# Patient Record
Sex: FEMALE | Race: WHITE | NOT HISPANIC OR LATINO | Employment: UNEMPLOYED | URBAN - METROPOLITAN AREA
[De-identification: names, ages, dates, MRNs, and addresses within clinical notes are randomized per-mention and may not be internally consistent; named-entity substitution may affect disease eponyms.]

---

## 2017-01-30 DIAGNOSIS — R00.0 TACHYCARDIA: ICD-10-CM

## 2017-01-30 RX ORDER — ATENOLOL 50 MG/1
TABLET ORAL
Qty: 30 TABLET | Refills: 0 | Status: SHIPPED | OUTPATIENT
Start: 2017-01-30 | End: 2017-03-02 | Stop reason: SDUPTHER

## 2017-01-30 NOTE — TELEPHONE ENCOUNTER
----- Message from Sherita Ulrich sent at 1/30/2017 12:25 PM CST -----  Refill: atenolol (TENORMIN) 50 MG tablet     Please send to [facility]. Thank you!

## 2017-03-02 DIAGNOSIS — R00.0 TACHYCARDIA: ICD-10-CM

## 2017-03-02 RX ORDER — ATENOLOL 50 MG/1
TABLET ORAL
Qty: 30 TABLET | Refills: 0 | Status: SHIPPED | OUTPATIENT
Start: 2017-03-02 | End: 2017-04-04 | Stop reason: SDUPTHER

## 2017-04-04 ENCOUNTER — OFFICE VISIT (OUTPATIENT)
Dept: FAMILY MEDICINE | Facility: CLINIC | Age: 52
End: 2017-04-04
Payer: COMMERCIAL

## 2017-04-04 VITALS
DIASTOLIC BLOOD PRESSURE: 110 MMHG | SYSTOLIC BLOOD PRESSURE: 148 MMHG | BODY MASS INDEX: 30.48 KG/M2 | HEART RATE: 84 BPM | TEMPERATURE: 98 F | HEIGHT: 64 IN | WEIGHT: 178.56 LBS | OXYGEN SATURATION: 99 %

## 2017-04-04 DIAGNOSIS — R00.0 TACHYCARDIA: ICD-10-CM

## 2017-04-04 DIAGNOSIS — I10 ESSENTIAL HYPERTENSION: ICD-10-CM

## 2017-04-04 DIAGNOSIS — Z00.00 ANNUAL PHYSICAL EXAM: Primary | ICD-10-CM

## 2017-04-04 DIAGNOSIS — M79.7 FIBROMYALGIA: ICD-10-CM

## 2017-04-04 PROCEDURE — 3080F DIAST BP >= 90 MM HG: CPT | Mod: S$GLB,,, | Performed by: FAMILY MEDICINE

## 2017-04-04 PROCEDURE — 99999 PR PBB SHADOW E&M-EST. PATIENT-LVL III: CPT | Mod: PBBFAC,,, | Performed by: FAMILY MEDICINE

## 2017-04-04 PROCEDURE — 3077F SYST BP >= 140 MM HG: CPT | Mod: S$GLB,,, | Performed by: FAMILY MEDICINE

## 2017-04-04 PROCEDURE — 99396 PREV VISIT EST AGE 40-64: CPT | Mod: S$GLB,,, | Performed by: FAMILY MEDICINE

## 2017-04-04 RX ORDER — ATENOLOL 50 MG/1
50 TABLET ORAL DAILY
Qty: 30 TABLET | Refills: 11 | Status: SHIPPED | OUTPATIENT
Start: 2017-04-04 | End: 2017-08-06 | Stop reason: ALTCHOICE

## 2017-04-04 RX ORDER — AMLODIPINE BESYLATE 2.5 MG/1
2.5 TABLET ORAL DAILY
Qty: 30 TABLET | Refills: 0 | Status: SHIPPED | OUTPATIENT
Start: 2017-04-04 | End: 2017-05-04 | Stop reason: SDUPTHER

## 2017-04-04 NOTE — PROGRESS NOTES
"Chief Complaint   Patient presents with    Medication Refill     SUBJECTIVE:   51 y.o. female for annual routine checkup.  Current Outpatient Prescriptions   Medication Sig Dispense Refill    atenolol (TENORMIN) 50 MG tablet Take 1 tablet (50 mg total) by mouth once daily. 30 tablet 11    butalbital-acetaminophen-caffeine -40 mg (FIORICET, ESGIC) -40 mg per tablet Take 1 tablet by mouth 2 (two) times daily as needed for Headaches. 60 tablet 0    fluticasone (FLONASE) 50 mcg/actuation nasal spray       LYRICA 75 mg capsule       tamoxifen (NOLVADEX) 20 MG Tab       tramadol (ULTRAM) 50 mg tablet Take 50 mg by mouth every 6 (six) hours as needed.      amlodipine (NORVASC) 2.5 MG tablet Take 1 tablet (2.5 mg total) by mouth once daily. 30 tablet 0     No current facility-administered medications for this visit.      Allergies: Erythromycin   Patient's last menstrual period was 03/28/2017.    ROS:  Feeling well. No dyspnea or chest pain on exertion.  No abdominal pain, change in bowel habits, black or bloody stools.  No urinary tract symptoms. GYN ROS: normal menses, no abnormal bleeding, pelvic pain or discharge, no breast pain or new or enlarging lumps on self exam. No neurological complaints.    OBJECTIVE:   The patient appears well, alert, oriented x 3, in no distress.  BP (!) 148/110 (BP Location: Left arm, Patient Position: Sitting, BP Method: Manual)  Pulse 84  Temp 97.7 °F (36.5 °C) (Oral)   Ht 5' 4" (1.626 m)  Wt 81 kg (178 lb 9.2 oz)  LMP 03/28/2017  SpO2 99%  BMI 30.65 kg/m2  ENT normal.  Neck supple. No adenopathy or thyromegaly. GERMAN. Lungs are clear, good air entry, no wheezes, rhonchi or rales. S1 and S2 normal, no murmurs, regular rate and rhythm. Abdomen soft without tenderness, guarding, mass or organomegaly. Extremities show no edema, normal peripheral pulses. Neurological is normal, no focal findings.    BREAST EXAM: deferred    PELVIC EXAM: deferred    ASSESSMENT:   1. " Annual physical exam    2. Tachycardia    3. Essential hypertension    4. Fibromyalgia          PLAN:   Vane was seen today for medication refill.    Diagnoses and all orders for this visit:    Annual physical exam  Counseled on age appropriate medical preventative services, including age appropriate cancer screenings, over all nutritional health, need for a consistent exercise regimen and an over all push towards maintaining a vigorous and active lifestyle.  Counseled on age appropriate vaccines and discussed upcoming health care needs based on age/gender.  Spent time with patient counseling on need for a good patient/doctor relationship moving forward.  Discussed use of common OTC medications and supplements.  Discussed common dietary aids and use of caffeine and the need for good sleep hygiene and stress management.    Tachycardia  -     atenolol (TENORMIN) 50 MG tablet; Take 1 tablet (50 mg total) by mouth once daily.    Essential hypertension  -     amlodipine (NORVASC) 2.5 MG tablet; Take 1 tablet (2.5 mg total) by mouth once daily.    Fibromyalgia  -     amlodipine (NORVASC) 2.5 MG tablet; Take 1 tablet (2.5 mg total) by mouth once daily.    Potential medication side effects were discussed with the patient; let me know if any occur.      We will see her back in 1 month to increase norvasc if any trouble

## 2017-05-04 DIAGNOSIS — M79.7 FIBROMYALGIA: ICD-10-CM

## 2017-05-04 DIAGNOSIS — I10 ESSENTIAL HYPERTENSION: ICD-10-CM

## 2017-05-04 RX ORDER — AMLODIPINE BESYLATE 5 MG/1
5 TABLET ORAL DAILY
Qty: 30 TABLET | Refills: 0 | Status: SHIPPED | OUTPATIENT
Start: 2017-05-04 | End: 2017-06-02 | Stop reason: SDUPTHER

## 2017-06-02 DIAGNOSIS — I10 ESSENTIAL HYPERTENSION: ICD-10-CM

## 2017-06-02 DIAGNOSIS — M79.7 FIBROMYALGIA: ICD-10-CM

## 2017-06-02 RX ORDER — AMLODIPINE BESYLATE 10 MG/1
10 TABLET ORAL DAILY
Qty: 30 TABLET | Refills: 1 | Status: SHIPPED | OUTPATIENT
Start: 2017-06-02 | End: 2017-09-25

## 2017-08-04 ENCOUNTER — TELEPHONE (OUTPATIENT)
Dept: FAMILY MEDICINE | Facility: CLINIC | Age: 52
End: 2017-08-04

## 2017-08-04 NOTE — TELEPHONE ENCOUNTER
----- Message from Adelaide Macias sent at 8/4/2017  2:25 PM CDT -----  Contact: [facility] [phone]  Pharmacy is requesting a change on the atenolol (TENORMIN) 50 MG tablet because its on back order pt is currently at the pharmacy Please call  at your earliest convenience.  Thanks

## 2017-08-06 RX ORDER — METOPROLOL TARTRATE 25 MG/1
25 TABLET, FILM COATED ORAL 2 TIMES DAILY
Qty: 60 TABLET | Refills: 2 | Status: SHIPPED | OUTPATIENT
Start: 2017-08-06 | End: 2017-11-28 | Stop reason: SDUPTHER

## 2017-09-25 ENCOUNTER — OFFICE VISIT (OUTPATIENT)
Dept: FAMILY MEDICINE | Facility: CLINIC | Age: 52
End: 2017-09-25
Payer: COMMERCIAL

## 2017-09-25 VITALS
SYSTOLIC BLOOD PRESSURE: 110 MMHG | WEIGHT: 179 LBS | OXYGEN SATURATION: 96 % | HEART RATE: 63 BPM | HEIGHT: 64 IN | BODY MASS INDEX: 30.56 KG/M2 | DIASTOLIC BLOOD PRESSURE: 80 MMHG | TEMPERATURE: 98 F

## 2017-09-25 DIAGNOSIS — M79.7 FIBROMYALGIA: Primary | ICD-10-CM

## 2017-09-25 DIAGNOSIS — Z17.0 MALIGNANT NEOPLASM OF RIGHT BREAST IN FEMALE, ESTROGEN RECEPTOR POSITIVE, UNSPECIFIED SITE OF BREAST: ICD-10-CM

## 2017-09-25 DIAGNOSIS — F39 MOOD DISORDER: ICD-10-CM

## 2017-09-25 DIAGNOSIS — Z12.11 SPECIAL SCREENING FOR MALIGNANT NEOPLASMS, COLON: ICD-10-CM

## 2017-09-25 DIAGNOSIS — C50.911 MALIGNANT NEOPLASM OF RIGHT BREAST IN FEMALE, ESTROGEN RECEPTOR POSITIVE, UNSPECIFIED SITE OF BREAST: ICD-10-CM

## 2017-09-25 DIAGNOSIS — I10 ESSENTIAL HYPERTENSION: ICD-10-CM

## 2017-09-25 DIAGNOSIS — F41.9 ANXIETY: ICD-10-CM

## 2017-09-25 PROCEDURE — 99999 PR PBB SHADOW E&M-EST. PATIENT-LVL III: CPT | Mod: PBBFAC,,, | Performed by: FAMILY MEDICINE

## 2017-09-25 PROCEDURE — 3079F DIAST BP 80-89 MM HG: CPT | Mod: S$GLB,,, | Performed by: FAMILY MEDICINE

## 2017-09-25 PROCEDURE — 3008F BODY MASS INDEX DOCD: CPT | Mod: S$GLB,,, | Performed by: FAMILY MEDICINE

## 2017-09-25 PROCEDURE — 99215 OFFICE O/P EST HI 40 MIN: CPT | Mod: S$GLB,,, | Performed by: FAMILY MEDICINE

## 2017-09-25 PROCEDURE — 3074F SYST BP LT 130 MM HG: CPT | Mod: S$GLB,,, | Performed by: FAMILY MEDICINE

## 2017-09-25 RX ORDER — BUSPIRONE HYDROCHLORIDE 5 MG/1
5 TABLET ORAL 2 TIMES DAILY
Qty: 60 TABLET | Refills: 5 | Status: SHIPPED | OUTPATIENT
Start: 2017-09-25 | End: 2018-03-13 | Stop reason: SDUPTHER

## 2017-09-25 RX ORDER — PREGABALIN 75 MG/1
75 CAPSULE ORAL 2 TIMES DAILY
Qty: 60 CAPSULE | Refills: 5 | Status: SHIPPED | OUTPATIENT
Start: 2017-09-25 | End: 2018-05-11 | Stop reason: SDUPTHER

## 2017-09-25 RX ORDER — ALPRAZOLAM 2 MG/1
2 TABLET ORAL NIGHTLY PRN
Qty: 25 TABLET | Refills: 0 | Status: SHIPPED | OUTPATIENT
Start: 2017-09-25 | End: 2019-02-22 | Stop reason: SDUPTHER

## 2017-09-25 RX ORDER — TRAMADOL HYDROCHLORIDE 50 MG/1
50 TABLET ORAL EVERY 6 HOURS PRN
Qty: 120 TABLET | Refills: 2 | Status: SHIPPED | OUTPATIENT
Start: 2017-09-25 | End: 2017-12-22 | Stop reason: SDUPTHER

## 2017-09-25 NOTE — PROGRESS NOTES
"Chief Complaint   Patient presents with    Medication Refill    Follow-up       SUBJECTIVE:  Vane Seo is a 52 y.o. female here for follow up of pain management for fibro and she has a lot of stress, yelling at her daughter and . Life stressors and she is having more sleep disturbance, still on tomoxifen for breast CA, no new lumps or bumps in the breast.  Currently has co-morbidities including per problem list.      Social History   Substance Use Topics    Smoking status: Never Smoker    Smokeless tobacco: Never Used    Alcohol use Yes       Patient Active Problem List    Diagnosis Date Noted    Anxiety 09/25/2017    Essential hypertension 04/04/2017    Fibromyalgia 09/04/2015 4/17 start norvasc 2.5, increase monthly by 2.5 to 10 mg.      History of pilonidal cyst 09/04/2015    Menstrual migraine without status migrainosus, not intractable 09/04/2015    Mood disorder 09/04/2015    Breast cancer 02/04/2014     Still on tamoxifen (2019)  mina for cancer, Vichot for arthritis         Review of Systems   Constitutional: Negative.    HENT: Negative.    Eyes: Negative.    Respiratory: Negative.    Cardiovascular: Negative.    Gastrointestinal: Negative.    Genitourinary: Negative.    Musculoskeletal: Positive for myalgias.   Skin: Negative.    Neurological: Negative.    Endo/Heme/Allergies: Negative.    Psychiatric/Behavioral: Positive for depression. The patient has insomnia.        OBJECTIVE:  /80   Pulse 63   Temp 98.3 °F (36.8 °C) (Oral)   Ht 5' 4" (1.626 m)   Wt 81.2 kg (179 lb 0.2 oz)   SpO2 96%   BMI 30.73 kg/m²     Wt Readings from Last 3 Encounters:   09/25/17 81.2 kg (179 lb 0.2 oz)   04/04/17 81 kg (178 lb 9.2 oz)   11/01/16 83.9 kg (185 lb)     BP Readings from Last 3 Encounters:   09/25/17 110/80   04/04/17 (!) 148/110   11/01/16 (!) 132/94       She appears well, in no apparent distress.  Alert and oriented times three, pleasant and cooperative. Vital signs are " as documented in vital signs section.  S1 and S2 normal, no murmurs, clicks, gallops or rubs. Regular rate and rhythm. Chest is clear; no wheezes or rales. No edema or JVD.  She is doing well today  A general joint exam is normal with full range of motion of spine, shoulders, elbows, wrists, fingers, hips, knees and ankles; no active swelling, tenderness or synovitis at any joint. No soft tissue nodules.      Review of old Records:  Reviewed per [facility]    Review of old labs:    Lab Results   Component Value Date    TSH 2.615 09/04/2015     Lab Results   Component Value Date    WBC 5.44 09/04/2015    HGB 13.2 09/04/2015    HCT 40.4 09/04/2015    MCV 83 09/04/2015     09/04/2015       Chemistry        Component Value Date/Time     09/04/2015 1027    K 3.9 09/04/2015 1027     09/04/2015 1027    CO2 28 09/04/2015 1027    BUN 10 09/04/2015 1027    CREATININE 0.8 09/04/2015 1027    GLU 94 09/04/2015 1027        Component Value Date/Time    CALCIUM 9.1 09/04/2015 1027    ALKPHOS 75 09/04/2015 1027    AST 14 09/04/2015 1027    ALT 12 09/04/2015 1027    BILITOT 0.4 09/04/2015 1027    ESTGFRAFRICA >60 09/04/2015 1027    EGFRNONAA >60 09/04/2015 1027        Lab Results   Component Value Date    CHOL 180 09/04/2015     Lab Results   Component Value Date    HDL 51 09/04/2015     Lab Results   Component Value Date    LDLCALC 100.2 09/04/2015     Lab Results   Component Value Date    TRIG 144 09/04/2015     Lab Results   Component Value Date    CHOLHDL 28.3 09/04/2015         Review of old imaging:  n/a      ASSESSMENT:  Problem List Items Addressed This Visit     Mood disorder    Fibromyalgia - Primary    Relevant Medications    tramadol (ULTRAM) 50 mg tablet    pregabalin (LYRICA) 75 MG capsule    Essential hypertension    Relevant Orders    TSH    CBC auto differential    Comprehensive metabolic panel    Lipid panel    Breast cancer    Anxiety    Relevant Medications    alprazolam (XANAX) 2 MG Tab    busPIRone  (BUSPAR) 5 MG Tab      Other Visit Diagnoses     Special screening for malignant neoplasms, colon        Relevant Orders    Case request GI: COLONOSCOPY (Completed)          ICD-10-CM ICD-9-CM   1. Fibromyalgia M79.7 729.1   2. Essential hypertension I10 401.9   3. Mood disorder F39 296.90   4. Malignant neoplasm of right breast in female, estrogen receptor positive, unspecified site of breast C50.911 174.9    Z17.0 V86.0   5. Special screening for malignant neoplasms, colon Z12.11 V76.51   6. Anxiety F41.9 300.00               PLAN:     we will take over the tramadol and lyrica    Start buspar and xanax PRN    Went over black box warning of benzo and tramadol    Continue tamoxifen    Get updated screening.        Medication List with Changes/Refills   New Medications    ALPRAZOLAM (XANAX) 2 MG TAB    Take 1 tablet (2 mg total) by mouth nightly as needed.    BUSPIRONE (BUSPAR) 5 MG TAB    Take 1 tablet (5 mg total) by mouth 2 (two) times daily.   Current Medications    BUTALBITAL-ACETAMINOPHEN-CAFFEINE -40 MG (FIORICET, ESGIC) -40 MG PER TABLET    Take 1 tablet by mouth 2 (two) times daily as needed for Headaches.    FLUTICASONE (FLONASE) 50 MCG/ACTUATION NASAL SPRAY        METOPROLOL TARTRATE (LOPRESSOR) 25 MG TABLET    Take 1 tablet (25 mg total) by mouth 2 (two) times daily. (discontinue atenolol)    TAMOXIFEN (NOLVADEX) 20 MG TAB       Changed and/or Refilled Medications    Modified Medication Previous Medication    PREGABALIN (LYRICA) 75 MG CAPSULE LYRICA 75 mg capsule       Take 1 capsule (75 mg total) by mouth 2 (two) times daily.    Take 75 mg by mouth.     TRAMADOL (ULTRAM) 50 MG TABLET tramadol (ULTRAM) 50 mg tablet       Take 1 tablet (50 mg total) by mouth every 6 (six) hours as needed.    Take 50 mg by mouth every 6 (six) hours as needed.   Discontinued Medications    AMLODIPINE (NORVASC) 10 MG TABLET    Take 1 tablet (10 mg total) by mouth once daily.       No Follow-up on file.

## 2017-11-28 DIAGNOSIS — I10 ESSENTIAL HYPERTENSION: Primary | ICD-10-CM

## 2017-11-28 RX ORDER — METOPROLOL TARTRATE 25 MG/1
25 TABLET, FILM COATED ORAL 2 TIMES DAILY
Qty: 60 TABLET | Refills: 2 | Status: SHIPPED | OUTPATIENT
Start: 2017-11-28 | End: 2018-03-08 | Stop reason: SDUPTHER

## 2017-11-28 NOTE — TELEPHONE ENCOUNTER
----- Message from Pamela Aguila sent at 11/28/2017 11:16 AM CST -----  Contact: SELF  REFILL: metoprolol tartrate (LOPRESSOR) 25 MG tablet    PLEASE SEND TO [facility]

## 2017-12-22 ENCOUNTER — TELEPHONE (OUTPATIENT)
Dept: FAMILY MEDICINE | Facility: CLINIC | Age: 52
End: 2017-12-22

## 2017-12-22 ENCOUNTER — OFFICE VISIT (OUTPATIENT)
Dept: FAMILY MEDICINE | Facility: CLINIC | Age: 52
End: 2017-12-22
Payer: COMMERCIAL

## 2017-12-22 VITALS
SYSTOLIC BLOOD PRESSURE: 130 MMHG | OXYGEN SATURATION: 97 % | RESPIRATION RATE: 16 BRPM | WEIGHT: 177.94 LBS | HEIGHT: 63 IN | BODY MASS INDEX: 31.53 KG/M2 | DIASTOLIC BLOOD PRESSURE: 84 MMHG | TEMPERATURE: 99 F | HEART RATE: 68 BPM

## 2017-12-22 DIAGNOSIS — J04.0 LARYNGITIS: Primary | ICD-10-CM

## 2017-12-22 DIAGNOSIS — M79.7 FIBROMYALGIA: ICD-10-CM

## 2017-12-22 PROCEDURE — 99999 PR PBB SHADOW E&M-EST. PATIENT-LVL IV: CPT | Mod: PBBFAC,,, | Performed by: PHYSICIAN ASSISTANT

## 2017-12-22 PROCEDURE — 99213 OFFICE O/P EST LOW 20 MIN: CPT | Mod: S$GLB,,, | Performed by: PHYSICIAN ASSISTANT

## 2017-12-22 RX ORDER — TRAMADOL HYDROCHLORIDE 50 MG/1
TABLET ORAL
Qty: 150 TABLET | Refills: 2 | Status: SHIPPED | OUTPATIENT
Start: 2017-12-22 | End: 2018-03-25 | Stop reason: SDUPTHER

## 2017-12-22 RX ORDER — BROMPHENIRAMINE MALEATE, DEXTROMETHORPHAN HYDROBROMIDE AND PHENYLEPHRINE HYDROCHLORIDE 1; 5; 2.5 MG/5ML; MG/5ML; MG/5ML
LIQUID ORAL
Refills: 0 | COMMUNITY
Start: 2017-12-20 | End: 2019-02-22

## 2017-12-22 RX ORDER — DOXYCYCLINE 100 MG/1
1 CAPSULE ORAL 2 TIMES DAILY
COMMUNITY
Start: 2017-12-20 | End: 2018-03-13

## 2017-12-22 NOTE — PROGRESS NOTES
Subjective:       Patient ID: Vane Seo is a 52 y.o. female with multiple medical diagnoses as listed in the medical history and problem list that presents for Laryngitis  .    Chief Complaint: Laryngitis      URI    This is a new problem. The current episode started in the past 7 days (4-5 days ). The problem has been unchanged. There has been no fever. Associated symptoms include coughing and a sore throat. Pertinent negatives include no chest pain, congestion, ear pain, headaches, rhinorrhea, sinus pain, sneezing or wheezing. Treatments tried: seen at [facility] after 2 days and was giving doxycyline which she is sttill on and a cough syrup         Review of Systems   Constitutional: Negative for chills, fatigue and fever.   HENT: Positive for sore throat and voice change. Negative for congestion, ear pain, postnasal drip, rhinorrhea, sinus pain, sinus pressure, sneezing and trouble swallowing.    Eyes: Negative for pain, discharge, redness and itching.   Respiratory: Positive for cough. Negative for chest tightness, shortness of breath and wheezing.    Cardiovascular: Negative for chest pain and palpitations.   Musculoskeletal: Negative for myalgias.   Neurological: Negative for headaches.         PAST MEDICAL HISTORY:  Past Medical History:   Diagnosis Date    Cancer     Fibromyalgia        SOCIAL HISTORY:  Social History     Social History    Marital status:      Spouse name: N/A    Number of children: N/A    Years of education: N/A     Occupational History    Not on file.     Social History Main Topics    Smoking status: Never Smoker    Smokeless tobacco: Never Used    Alcohol use Yes    Drug use: No    Sexual activity: Yes     Partners: Male     Other Topics Concern    Not on file     Social History Narrative    No narrative on file       ALLERGIES AND MEDICATIONS: updated and reviewed.  Review of patient's allergies indicates:   Allergen Reactions    Erythromycin Other (See  "Comments)     convulsions     Current Outpatient Prescriptions   Medication Sig Dispense Refill    alprazolam (XANAX) 2 MG Tab Take 1 tablet (2 mg total) by mouth nightly as needed. 25 tablet 0    busPIRone (BUSPAR) 5 MG Tab Take 1 tablet (5 mg total) by mouth 2 (two) times daily. 60 tablet 5    butalbital-acetaminophen-caffeine -40 mg (FIORICET, ESGIC) -40 mg per tablet Take 1 tablet by mouth 2 (two) times daily as needed for Headaches. 60 tablet 0    doxycycline (VIBRAMYCIN) 100 MG Cap Take 1 capsule by mouth 2 (two) times daily.      ENDACOF - DM 1-2.5-5 mg/5 mL Soln Give 5 ml (1 teaspoon) BY MOUTH EVERY 6 HOURS FOR 5 DAYS  0    fluticasone (FLONASE) 50 mcg/actuation nasal spray       metoprolol tartrate (LOPRESSOR) 25 MG tablet Take 1 tablet (25 mg total) by mouth 2 (two) times daily. (discontinue atenolol) 60 tablet 2    polyethylene glycol (COLYTE) 240-22.72-6.72 -5.84 gram SolR Take 4,000 mLs (4 L total) by mouth as needed. 1 Bottle 0    pregabalin (LYRICA) 75 MG capsule Take 1 capsule (75 mg total) by mouth 2 (two) times daily. (Patient taking differently: Take 150 mg by mouth every evening. ) 60 capsule 5    tamoxifen (NOLVADEX) 20 MG Tab Take 20 mg by mouth once daily.       tramadol (ULTRAM) 50 mg tablet Take 1 tablet (50 mg total) by mouth every 6 (six) hours as needed. 120 tablet 2     No current facility-administered medications for this visit.          Objective:   /84   Pulse 68   Temp 98.5 °F (36.9 °C) (Oral)   Resp 16   Ht 5' 3" (1.6 m)   Wt 80.7 kg (177 lb 14.6 oz)   SpO2 97%   BMI 31.52 kg/m²      Physical Exam   Constitutional: She is oriented to person, place, and time. No distress.   HENT:   Head: Normocephalic and atraumatic.   Right Ear: Tympanic membrane, external ear and ear canal normal.   Left Ear: Tympanic membrane, external ear and ear canal normal.   Nose: Nose normal.   Mouth/Throat: Uvula is midline and mucous membranes are normal. No " oropharyngeal exudate or posterior oropharyngeal erythema (mild ). No tonsillar exudate.   Eyes: Conjunctivae and EOM are normal.   Cardiovascular: Normal rate and regular rhythm.    Pulmonary/Chest: Effort normal and breath sounds normal. She has no wheezes.   Lymphadenopathy:     She has no cervical adenopathy.   Neurological: She is alert and oriented to person, place, and time.   Skin: Skin is warm. No erythema.           Assessment:       1. Laryngitis        Plan:       Laryngitis  Voice rest  Salt water rinses  Hot tea          No Follow-up on file.

## 2018-03-08 DIAGNOSIS — I10 ESSENTIAL HYPERTENSION: ICD-10-CM

## 2018-03-08 RX ORDER — METOPROLOL TARTRATE 25 MG/1
TABLET, FILM COATED ORAL
Qty: 60 TABLET | Refills: 2 | Status: SHIPPED | OUTPATIENT
Start: 2018-03-08 | End: 2018-06-13 | Stop reason: SDUPTHER

## 2018-03-13 ENCOUNTER — OFFICE VISIT (OUTPATIENT)
Dept: FAMILY MEDICINE | Facility: CLINIC | Age: 53
End: 2018-03-13
Payer: COMMERCIAL

## 2018-03-13 VITALS
OXYGEN SATURATION: 95 % | BODY MASS INDEX: 30.56 KG/M2 | DIASTOLIC BLOOD PRESSURE: 70 MMHG | WEIGHT: 179 LBS | HEART RATE: 69 BPM | HEIGHT: 64 IN | SYSTOLIC BLOOD PRESSURE: 116 MMHG | TEMPERATURE: 98 F | RESPIRATION RATE: 16 BRPM

## 2018-03-13 DIAGNOSIS — F41.9 ANXIETY: ICD-10-CM

## 2018-03-13 DIAGNOSIS — K43.9 VENTRAL HERNIA WITHOUT OBSTRUCTION OR GANGRENE: ICD-10-CM

## 2018-03-13 DIAGNOSIS — G43.829 MENSTRUAL MIGRAINE WITHOUT STATUS MIGRAINOSUS, NOT INTRACTABLE: ICD-10-CM

## 2018-03-13 DIAGNOSIS — Z12.11 COLON CANCER SCREENING: Primary | ICD-10-CM

## 2018-03-13 PROCEDURE — 99214 OFFICE O/P EST MOD 30 MIN: CPT | Mod: S$GLB,,, | Performed by: PHYSICIAN ASSISTANT

## 2018-03-13 PROCEDURE — 3074F SYST BP LT 130 MM HG: CPT | Mod: CPTII,S$GLB,, | Performed by: PHYSICIAN ASSISTANT

## 2018-03-13 PROCEDURE — 3078F DIAST BP <80 MM HG: CPT | Mod: CPTII,S$GLB,, | Performed by: PHYSICIAN ASSISTANT

## 2018-03-13 PROCEDURE — 99999 PR PBB SHADOW E&M-EST. PATIENT-LVL IV: CPT | Mod: PBBFAC,,, | Performed by: PHYSICIAN ASSISTANT

## 2018-03-13 RX ORDER — BUTALBITAL, ACETAMINOPHEN AND CAFFEINE 50; 325; 40 MG/1; MG/1; MG/1
1 TABLET ORAL 2 TIMES DAILY PRN
Qty: 60 TABLET | Refills: 0 | Status: SHIPPED | OUTPATIENT
Start: 2018-03-13 | End: 2020-08-21 | Stop reason: ALTCHOICE

## 2018-03-13 RX ORDER — BUSPIRONE HYDROCHLORIDE 5 MG/1
5 TABLET ORAL 2 TIMES DAILY
Qty: 180 TABLET | Refills: 1 | Status: SHIPPED | OUTPATIENT
Start: 2018-03-13 | End: 2018-09-19 | Stop reason: SDUPTHER

## 2018-03-13 NOTE — PROGRESS NOTES
Subjective:       Patient ID: Vane Seo is a 52 y.o. female with multiple medical diagnoses as listed in the medical history and problem list that presents for Headache (chronic)  .    Chief Complaint: Headache (chronic)      Headache    This is a chronic problem. The current episode started more than 1 year ago. The problem has been gradually worsening (in the last two weeks basically daily ). The pain is located in the right unilateral region. The pain radiates to the right neck. The pain quality is similar to prior headaches. The quality of the pain is described as aching. Associated symptoms include nausea, phonophobia and photophobia. Pertinent negatives include no anorexia, blurred vision, coughing, ear pain, eye pain, eye redness, eye watering, sinus pressure or vomiting (a couple ). The symptoms are aggravated by bright light and noise. She has tried NSAIDs for the symptoms. The treatment provided significant (75 percent ) relief. Her past medical history is significant for migraine headaches. There is no history of migraines in the family or sinus disease.   Mass   This is a new problem. The current episode started 1 to 4 weeks ago. Associated symptoms include headaches and nausea. Pertinent negatives include no anorexia, coughing, urinary symptoms or vomiting (a couple ). Associated symptoms comments: Painful here and there but not much . She has tried nothing for the symptoms.     Review of Systems   HENT: Negative for ear pain and sinus pressure.    Eyes: Positive for photophobia. Negative for blurred vision, pain and redness.   Respiratory: Negative for cough.    Gastrointestinal: Positive for nausea. Negative for anorexia and vomiting (a couple ).   Neurological: Positive for headaches.         PAST MEDICAL HISTORY:  Past Medical History:   Diagnosis Date    Cancer     Fibromyalgia        SOCIAL HISTORY:  Social History     Social History    Marital status:      Spouse name: N/A     "Number of children: N/A    Years of education: N/A     Occupational History    Not on file.     Social History Main Topics    Smoking status: Never Smoker    Smokeless tobacco: Never Used    Alcohol use Yes    Drug use: No    Sexual activity: Yes     Partners: Male     Other Topics Concern    Not on file     Social History Narrative    No narrative on file       ALLERGIES AND MEDICATIONS: updated and reviewed.  Review of patient's allergies indicates:   Allergen Reactions    Erythromycin Other (See Comments)     convulsions     Current Outpatient Prescriptions   Medication Sig Dispense Refill    alprazolam (XANAX) 2 MG Tab Take 1 tablet (2 mg total) by mouth nightly as needed. 25 tablet 0    busPIRone (BUSPAR) 5 MG Tab Take 1 tablet (5 mg total) by mouth 2 (two) times daily. 180 tablet 1    butalbital-acetaminophen-caffeine -40 mg (FIORICET, ESGIC) -40 mg per tablet Take 1 tablet by mouth 2 (two) times daily as needed for Headaches. 60 tablet 0    fluticasone (FLONASE) 50 mcg/actuation nasal spray       metoprolol tartrate (LOPRESSOR) 25 MG tablet TAKE ONE TABLET BY MOUTH TWICE DAILY DISCONTINUE  ATENOLOL 60 tablet 2    pregabalin (LYRICA) 75 MG capsule Take 1 capsule (75 mg total) by mouth 2 (two) times daily. (Patient taking differently: Take 150 mg by mouth every evening. ) 60 capsule 5    traMADol (ULTRAM) 50 mg tablet 1 PO every 4 hours during the day PRN pain and 2 PO at night PRN pain 150 tablet 2    ENDACOF - DM 1-2.5-5 mg/5 mL Soln Give 5 ml (1 teaspoon) BY MOUTH EVERY 6 HOURS FOR 5 DAYS  0    polyethylene glycol (COLYTE) 240-22.72-6.72 -5.84 gram SolR Take 4,000 mLs (4 L total) by mouth as needed. 1 Bottle 0    tamoxifen (NOLVADEX) 20 MG Tab Take 20 mg by mouth once daily.        No current facility-administered medications for this visit.          Objective:   /70   Pulse 69   Temp 98.3 °F (36.8 °C) (Oral)   Resp 16   Ht 5' 4" (1.626 m)   Wt 81.2 kg (179 lb 0.2 " oz)   SpO2 95%   BMI 30.73 kg/m²      Physical Exam   Constitutional: She is oriented to person, place, and time. No distress.   HENT:   Head: Normocephalic and atraumatic.   Cardiovascular: Normal rate and regular rhythm.    Pulmonary/Chest: Effort normal and breath sounds normal. She has no wheezes.   Abdominal: Bowel sounds are normal. There is no tenderness. A hernia is present. Hernia confirmed positive in the ventral area.   Neurological: She is alert and oriented to person, place, and time.   Skin: Skin is warm. No erythema.           Assessment:       1. Colon cancer screening    2. Anxiety    3. Menstrual migraine without status migrainosus, not intractable    4. Ventral hernia without obstruction or gangrene        Plan:       Colon cancer screening  -     Case request GI: COLONOSCOPY  Number to call given for when she returns form her trip     Anxiety  -     busPIRone (BUSPAR) 5 MG Tab; Take 1 tablet (5 mg total) by mouth 2 (two) times daily.  Dispense: 180 tablet; Refill: 1    Menstrual migraine without status migrainosus, not intractable  -     butalbital-acetaminophen-caffeine -40 mg (FIORICET, ESGIC) -40 mg per tablet; Take 1 tablet by mouth 2 (two) times daily as needed for Headaches.  Dispense: 60 tablet; Refill: 0    Ventral hernia without obstruction or gangrene   She will see Dr. Bowser if it get more bothersome. She is aware if pain becomes persist or severe she may have strangulation and need to go to hospital immediately.  She is also aware to avoid heavy lifting.     Refuse shots today.           No Follow-up on file.

## 2018-03-25 DIAGNOSIS — M79.7 FIBROMYALGIA: ICD-10-CM

## 2018-03-26 RX ORDER — TRAMADOL HYDROCHLORIDE 50 MG/1
TABLET ORAL
Qty: 150 TABLET | Refills: 2 | Status: SHIPPED | OUTPATIENT
Start: 2018-03-26 | End: 2018-06-25 | Stop reason: SDUPTHER

## 2018-03-31 ENCOUNTER — HOSPITAL ENCOUNTER (EMERGENCY)
Facility: HOSPITAL | Age: 53
Discharge: HOME | End: 2018-03-31
Attending: EMERGENCY MEDICINE
Payer: COMMERCIAL

## 2018-03-31 VITALS
BODY MASS INDEX: 29.71 KG/M2 | HEIGHT: 64 IN | OXYGEN SATURATION: 98 % | SYSTOLIC BLOOD PRESSURE: 146 MMHG | WEIGHT: 174 LBS | RESPIRATION RATE: 13 BRPM | DIASTOLIC BLOOD PRESSURE: 82 MMHG | TEMPERATURE: 99 F | HEART RATE: 60 BPM

## 2018-03-31 DIAGNOSIS — R07.9 CHEST PAIN: ICD-10-CM

## 2018-03-31 LAB
ALBUMIN SERPL BCP-MCNC: 3.9 G/DL
ALP SERPL-CCNC: 104 U/L
ALT SERPL W/O P-5'-P-CCNC: 22 U/L
ANION GAP SERPL CALC-SCNC: 9 MMOL/L
AST SERPL-CCNC: 20 U/L
BASOPHILS # BLD AUTO: 0.04 K/UL
BASOPHILS NFR BLD: 0.7 %
BILIRUB SERPL-MCNC: 0.4 MG/DL
BNP SERPL-MCNC: 48 PG/ML
BUN SERPL-MCNC: 17 MG/DL
CALCIUM SERPL-MCNC: 9.6 MG/DL
CHLORIDE SERPL-SCNC: 105 MMOL/L
CO2 SERPL-SCNC: 28 MMOL/L
CREAT SERPL-MCNC: 0.8 MG/DL
D DIMER PPP IA.FEU-MCNC: 0.24 MG/L FEU
DIFFERENTIAL METHOD: NORMAL
EOSINOPHIL # BLD AUTO: 0.1 K/UL
EOSINOPHIL NFR BLD: 1.5 %
ERYTHROCYTE [DISTWIDTH] IN BLOOD BY AUTOMATED COUNT: 13.4 %
EST. GFR  (AFRICAN AMERICAN): >60 ML/MIN/1.73 M^2
EST. GFR  (NON AFRICAN AMERICAN): >60 ML/MIN/1.73 M^2
GLUCOSE SERPL-MCNC: 89 MG/DL
HCT VFR BLD AUTO: 42.7 %
HGB BLD-MCNC: 14.1 G/DL
LYMPHOCYTES # BLD AUTO: 2.2 K/UL
LYMPHOCYTES NFR BLD: 37.3 %
MCH RBC QN AUTO: 27.4 PG
MCHC RBC AUTO-ENTMCNC: 33 G/DL
MCV RBC AUTO: 83 FL
MONOCYTES # BLD AUTO: 0.4 K/UL
MONOCYTES NFR BLD: 6.4 %
NEUTROPHILS # BLD AUTO: 3.2 K/UL
NEUTROPHILS NFR BLD: 53.4 %
PLATELET # BLD AUTO: 212 K/UL
PMV BLD AUTO: 10.9 FL
POTASSIUM SERPL-SCNC: 4.2 MMOL/L
PROT SERPL-MCNC: 7.6 G/DL
RBC # BLD AUTO: 5.15 M/UL
SODIUM SERPL-SCNC: 142 MMOL/L
TROPONIN I SERPL DL<=0.01 NG/ML-MCNC: <0.006 NG/ML
WBC # BLD AUTO: 5.9 K/UL

## 2018-03-31 PROCEDURE — 85379 FIBRIN DEGRADATION QUANT: CPT

## 2018-03-31 PROCEDURE — 25000003 PHARM REV CODE 250: Performed by: EMERGENCY MEDICINE

## 2018-03-31 PROCEDURE — 84484 ASSAY OF TROPONIN QUANT: CPT

## 2018-03-31 PROCEDURE — 80053 COMPREHEN METABOLIC PANEL: CPT

## 2018-03-31 PROCEDURE — 83880 ASSAY OF NATRIURETIC PEPTIDE: CPT

## 2018-03-31 PROCEDURE — 93005 ELECTROCARDIOGRAM TRACING: CPT

## 2018-03-31 PROCEDURE — 99284 EMERGENCY DEPT VISIT MOD MDM: CPT

## 2018-03-31 PROCEDURE — 85025 COMPLETE CBC W/AUTO DIFF WBC: CPT

## 2018-03-31 PROCEDURE — 93010 ELECTROCARDIOGRAM REPORT: CPT | Mod: ,,, | Performed by: INTERNAL MEDICINE

## 2018-03-31 RX ORDER — ASPIRIN 325 MG
325 TABLET ORAL
Status: COMPLETED | OUTPATIENT
Start: 2018-03-31 | End: 2018-03-31

## 2018-03-31 RX ADMIN — ASPIRIN 325 MG ORAL TABLET 325 MG: 325 PILL ORAL at 03:03

## 2018-03-31 NOTE — ED PROVIDER NOTES
Encounter Date: 3/31/2018    SCRIBE #1 NOTE: I, Mitch Phillip, am scribing for, and in the presence of,  Santos Bauman MD. I have scribed the following portions of the note - Other sections scribed: HPI, ROS, PE.       History     Chief Complaint   Patient presents with    Chest Pain     Pt reports headaches for past couple days, states chest pain started today PTA. Reports midsternal pain radiating to back and abdomen 2/10.     CC: Chest Pain    HPI: This 52 y.o. Female with breast cancer x4 years ago and fibromyalgia presents to the ED c/o intermittent sternal chest pain, headache and back pain which began today. Pt reports her chest pain episode lasted x10 mins. Denies nausea, vomiting, SOB, diaphoresis, dizziness. Denies radiation. Notes it started and ended spontaneously while driving. She states her chest pain feels like heartburn. Pt complies with at home medications. She denies hx of chest pain, fever, chills or rhinorrhea. Pt reports her tamoxifen makes her body hurt worse. She has a family hx of heart attacks and strokes including her brother, father and mother. Pt has R sided mastectomy.  Recently under stress due to her grandchild being born at 26 weeks premature.      The history is provided by the patient. No  was used.     Review of patient's allergies indicates:   Allergen Reactions    Erythromycin Other (See Comments)     convulsions     Past Medical History:   Diagnosis Date    Cancer     Breast cancer  4 years ago.     Fibromyalgia      Past Surgical History:   Procedure Laterality Date    BREAST SURGERY      2015  rt. breast removed.       SECTION      2 c sections.       Family History   Problem Relation Age of Onset    Asthma Mother     Arthritis Mother     Heart disease Mother     Hypertension Mother     Kidney disease Mother     Stroke Mother     Heart disease Father     Hypertension Other     Colon cancer Paternal Grandfather       Social History   Substance Use Topics    Smoking status: Former Smoker    Smokeless tobacco: Never Used      Comment: 1993    Alcohol use No     Review of Systems   Constitutional: Negative for chills and fever.   HENT: Negative for ear pain, rhinorrhea and sore throat.    Eyes: Negative for redness.   Respiratory: Negative for shortness of breath.    Cardiovascular: Positive for chest pain (intermittent sternal).   Gastrointestinal: Negative for abdominal pain, diarrhea, nausea and vomiting.   Genitourinary: Negative for dysuria and hematuria.   Musculoskeletal: Positive for back pain. Negative for neck pain.   Skin: Negative for rash.   Neurological: Positive for headaches. Negative for weakness and numbness.   Hematological: Does not bruise/bleed easily.   Psychiatric/Behavioral: Negative for confusion. The patient is not nervous/anxious.        Physical Exam     Initial Vitals [03/31/18 1503]   BP Pulse Resp Temp SpO2   (!) 157/98 68 18 98.5 °F (36.9 °C) 96 %      MAP       117.67         Physical Exam    Nursing note and vitals reviewed.  Constitutional: She appears well-developed and well-nourished. She is not diaphoretic. No distress.   HENT:   Head: Normocephalic and atraumatic.   Nose: Nose normal.   Eyes: EOM are normal. Pupils are equal, round, and reactive to light. No scleral icterus.   Neck: Normal range of motion. Neck supple.   Cardiovascular: Normal rate, regular rhythm, normal heart sounds and intact distal pulses. Exam reveals no gallop and no friction rub.    No murmur heard.  Pulmonary/Chest: Breath sounds normal. No stridor. No respiratory distress. She has no wheezes. She has no rhonchi. She has no rales.   Abdominal: Soft. Normal appearance and bowel sounds are normal. She exhibits no distension. There is no tenderness. There is no rebound and no guarding.   Musculoskeletal: Normal range of motion. She exhibits no edema or tenderness.   Neurological: She is oriented to person, place,  and time. No cranial nerve deficit.   Skin: Skin is warm and dry. No rash noted.   Psychiatric: She has a normal mood and affect. Her behavior is normal.         ED Course   Procedures  Labs Reviewed   CBC W/ AUTO DIFFERENTIAL   COMPREHENSIVE METABOLIC PANEL   TROPONIN I   B-TYPE NATRIURETIC PEPTIDE   TROPONIN I   D DIMER, QUANTITATIVE     EKG Readings: (Independently Interpreted)   Initial Reading: No STEMI. Rhythm: Normal Sinus Rhythm. Heart Rate: 67. Ectopy: No Ectopy.          Medical Decision Making:   Initial Assessment:   52-year-old female presenting with 10 minute episode of chest pain, midsternal, dull ache.  Occurred and resolved spontaneously.  Denies nausea vomiting diaphoresis.  History of cancer and hypertension.  States her cholesterol is good does not take medication for it.  Denies shortness of breath, lower extremity edema, hemoptysis, history of PE or DVT.  History of breast cancer treated years ago.  Not currently taking any steroids or hormones.  Perc negative except for age of 52.  Well score zero.  Patient notes she was on her way to visit her grandchild who was born 3 months premature and is in the ICU.  As a strong family history including a brother who  of a heart attack in his 40s and dad who started having heart difficulties in his 40s and 50s.  I calculated a heart score 3 based on 2 risk factors risk factors and moderately suspicious story.     Differential includes ACS, GERD.  Less likely pneumonia, dissection, or other surgical emergency such as pneumothorax, pneumonia, Pe.  Will get Trop ×2 and if negative sent home with close primary care follow-up.  EKG reveals normal sinus rhythm rate of 67 and no significant concerning signs of ischemia.      Initial troponin negative.  Patient remains chest pain-free.  Patient requested to leave prior to getting her second troponin.  She states she thinks the episode was just heartburn and does not wish to stay for any further workup.  She  states she will follow-up with her doctor on Monday and schedule an outpatient stress test in the next few days.  I discussed the risks and benefits and the fact that a heart attack cannot be excluded with an early first troponin without repeating it in that her risk of having a major adverse cardiac event of the next 30 days is increased by not getting a second set.  She voices understanding but stated she was not concerned and would like to go home without getting the second set.  I have no reason to suspect the patient does not have capability to understand the consequences of his actions. She did not appear intoxicated, deranged, or altered. Patient encouraged to return for any new or worsening condition.  Strict return precautions discussed as well as need for primary care follow-up early this week.              Scribe Attestation:   Scribe #1: I performed the above scribed service and the documentation accurately describes the services I performed. I attest to the accuracy of the note.    Attending Attestation:           Physician Attestation for Scribe:  Physician Attestation Statement for Scribe #1: I, Santos Bauman MD, reviewed documentation, as scribed by Mitch Phillip in my presence, and it is both accurate and complete.                    Clinical Impression:   The encounter diagnosis was Chest pain.    Disposition:   Disposition: Discharged  Condition: Stable                        Santos Bauman MD  03/31/18 1365

## 2018-03-31 NOTE — DISCHARGE INSTRUCTIONS
You were seen in the emergency department for chest pain.  Your EKG, labs, exam, are all reassuring.  We are not sure what the cause of your chest pain is however we do not believe it is anything immediately dangerous.  Please follow-up with your primary care provider early this week.  Please return for any new or worsening chest pain, nausea, vomiting, difficulty breathing, coughing up blood, profuse sweating, dizziness, lightheadedness, numbness, weakness, or any other new or worsening concerns.  We offered to draw a second set of lab work to ensure that you are not having any signs of a heart attack.  You declined and desired to go home.  Please return immediately for any new or concerning symptoms.  Your welcome to come back any time to be evaluated again.

## 2018-05-09 ENCOUNTER — TELEPHONE (OUTPATIENT)
Dept: ADMINISTRATIVE | Facility: HOSPITAL | Age: 53
End: 2018-05-09

## 2018-05-11 DIAGNOSIS — M79.7 FIBROMYALGIA: ICD-10-CM

## 2018-05-11 RX ORDER — PREGABALIN 75 MG/1
CAPSULE ORAL
Qty: 60 CAPSULE | Refills: 5 | Status: SHIPPED | OUTPATIENT
Start: 2018-05-11 | End: 2019-02-22 | Stop reason: SDUPTHER

## 2018-06-13 DIAGNOSIS — I10 ESSENTIAL HYPERTENSION: ICD-10-CM

## 2018-06-13 RX ORDER — METOPROLOL TARTRATE 25 MG/1
TABLET, FILM COATED ORAL
Qty: 180 TABLET | Refills: 3 | Status: SHIPPED | OUTPATIENT
Start: 2018-06-13 | End: 2018-08-29 | Stop reason: SDUPTHER

## 2018-06-25 DIAGNOSIS — M79.7 FIBROMYALGIA: ICD-10-CM

## 2018-06-25 RX ORDER — TRAMADOL HYDROCHLORIDE 50 MG/1
TABLET ORAL
Qty: 150 TABLET | Refills: 0 | Status: SHIPPED | OUTPATIENT
Start: 2018-06-25 | End: 2018-07-25 | Stop reason: SDUPTHER

## 2018-07-25 DIAGNOSIS — M79.7 FIBROMYALGIA: ICD-10-CM

## 2018-07-25 RX ORDER — TRAMADOL HYDROCHLORIDE 50 MG/1
TABLET ORAL
Qty: 150 TABLET | Refills: 0 | Status: SHIPPED | OUTPATIENT
Start: 2018-07-25 | End: 2018-08-29 | Stop reason: SDUPTHER

## 2018-08-29 DIAGNOSIS — I10 ESSENTIAL HYPERTENSION: ICD-10-CM

## 2018-08-29 DIAGNOSIS — M79.7 FIBROMYALGIA: ICD-10-CM

## 2018-08-29 RX ORDER — TRAMADOL HYDROCHLORIDE 50 MG/1
TABLET ORAL
Qty: 150 TABLET | Refills: 0 | Status: SHIPPED | OUTPATIENT
Start: 2018-08-29 | End: 2018-09-19 | Stop reason: SDUPTHER

## 2018-08-29 RX ORDER — METOPROLOL TARTRATE 25 MG/1
TABLET, FILM COATED ORAL
Qty: 180 TABLET | Refills: 3 | Status: SHIPPED | OUTPATIENT
Start: 2018-08-29 | End: 2018-09-19 | Stop reason: SDUPTHER

## 2018-08-29 NOTE — TELEPHONE ENCOUNTER
----- Message from Trice Mckinney sent at 8/29/2018 12:09 PM CDT -----  Contact: self /  [phone]  Pt states she is out of her meds. She is asking that---Tramadol--- and---metoprolol tartrate (LOPRESSOR) 25 MG tablet--- are called in or asking for a sooner appt than 09/17.    ..  [id number]  [address]  [id number] [facility]  [place] [address]  Phone: [phone] Fax: [phone]

## 2018-09-05 DIAGNOSIS — Z00.00 ANNUAL PHYSICAL EXAM: Primary | ICD-10-CM

## 2018-09-15 LAB
ALBUMIN SERPL-MCNC: 4.3 G/DL (ref 3.6–5.1)
ALBUMIN/GLOB SERPL: 1.4 (CALC) (ref 1–2.5)
ALP SERPL-CCNC: 100 U/L (ref 33–130)
ALT SERPL-CCNC: 19 U/L (ref 6–29)
AST SERPL-CCNC: 17 U/L (ref 10–35)
BASOPHILS # BLD AUTO: 48 CELLS/UL (ref 0–200)
BASOPHILS NFR BLD AUTO: 0.9 %
BILIRUB SERPL-MCNC: 0.6 MG/DL (ref 0.2–1.2)
BUN SERPL-MCNC: 14 MG/DL (ref 7–25)
BUN/CREAT SERPL: NORMAL (CALC) (ref 6–22)
CALCIUM SERPL-MCNC: 9.5 MG/DL (ref 8.6–10.4)
CHLORIDE SERPL-SCNC: 102 MMOL/L (ref 98–110)
CHOLEST SERPL-MCNC: 204 MG/DL
CHOLEST/HDLC SERPL: 3.6 (CALC)
CO2 SERPL-SCNC: 30 MMOL/L (ref 20–32)
CREAT SERPL-MCNC: 0.67 MG/DL (ref 0.5–1.05)
EOSINOPHIL # BLD AUTO: 111 CELLS/UL (ref 15–500)
EOSINOPHIL NFR BLD AUTO: 2.1 %
ERYTHROCYTE [DISTWIDTH] IN BLOOD BY AUTOMATED COUNT: 13.6 % (ref 11–15)
GFR SERPL CREATININE-BSD FRML MDRD: 101 ML/MIN/1.73M2
GLOBULIN SER CALC-MCNC: 3.1 G/DL (CALC) (ref 1.9–3.7)
GLUCOSE SERPL-MCNC: 74 MG/DL (ref 65–99)
HCT VFR BLD AUTO: 42.7 % (ref 35–45)
HDLC SERPL-MCNC: 57 MG/DL
HGB BLD-MCNC: 14 G/DL (ref 11.7–15.5)
LDLC SERPL CALC-MCNC: 124 MG/DL (CALC)
LYMPHOCYTES # BLD AUTO: 1818 CELLS/UL (ref 850–3900)
LYMPHOCYTES NFR BLD AUTO: 34.3 %
MCH RBC QN AUTO: 26.6 PG (ref 27–33)
MCHC RBC AUTO-ENTMCNC: 32.8 G/DL (ref 32–36)
MCV RBC AUTO: 81 FL (ref 80–100)
MONOCYTES # BLD AUTO: 318 CELLS/UL (ref 200–950)
MONOCYTES NFR BLD AUTO: 6 %
NEUTROPHILS # BLD AUTO: 3005 CELLS/UL (ref 1500–7800)
NEUTROPHILS NFR BLD AUTO: 56.7 %
NONHDLC SERPL-MCNC: 147 MG/DL (CALC)
PLATELET # BLD AUTO: 215 THOUSAND/UL (ref 140–400)
PMV BLD REES-ECKER: 10.4 FL (ref 7.5–12.5)
POTASSIUM SERPL-SCNC: 4.5 MMOL/L (ref 3.5–5.3)
PROT SERPL-MCNC: 7.4 G/DL (ref 6.1–8.1)
RBC # BLD AUTO: 5.27 MILLION/UL (ref 3.8–5.1)
SODIUM SERPL-SCNC: 140 MMOL/L (ref 135–146)
TRIGL SERPL-MCNC: 119 MG/DL
TSH SERPL-ACNC: 2.05 MIU/L
WBC # BLD AUTO: 5.3 THOUSAND/UL (ref 3.8–10.8)

## 2018-09-19 ENCOUNTER — OFFICE VISIT (OUTPATIENT)
Dept: FAMILY MEDICINE | Facility: CLINIC | Age: 53
End: 2018-09-19
Payer: COMMERCIAL

## 2018-09-19 VITALS
TEMPERATURE: 98 F | OXYGEN SATURATION: 96 % | BODY MASS INDEX: 30.83 KG/M2 | DIASTOLIC BLOOD PRESSURE: 88 MMHG | HEIGHT: 64 IN | SYSTOLIC BLOOD PRESSURE: 120 MMHG | WEIGHT: 180.56 LBS | HEART RATE: 78 BPM

## 2018-09-19 DIAGNOSIS — F41.9 ANXIETY: ICD-10-CM

## 2018-09-19 DIAGNOSIS — I10 ESSENTIAL HYPERTENSION: ICD-10-CM

## 2018-09-19 DIAGNOSIS — M79.7 FIBROMYALGIA: ICD-10-CM

## 2018-09-19 DIAGNOSIS — Z00.00 ANNUAL PHYSICAL EXAM: Primary | ICD-10-CM

## 2018-09-19 PROCEDURE — 3074F SYST BP LT 130 MM HG: CPT | Mod: CPTII,S$GLB,, | Performed by: FAMILY MEDICINE

## 2018-09-19 PROCEDURE — 99999 PR PBB SHADOW E&M-EST. PATIENT-LVL III: CPT | Mod: PBBFAC,,, | Performed by: FAMILY MEDICINE

## 2018-09-19 PROCEDURE — 3079F DIAST BP 80-89 MM HG: CPT | Mod: CPTII,S$GLB,, | Performed by: FAMILY MEDICINE

## 2018-09-19 PROCEDURE — 99396 PREV VISIT EST AGE 40-64: CPT | Mod: S$GLB,,, | Performed by: FAMILY MEDICINE

## 2018-09-19 RX ORDER — VENLAFAXINE 37.5 MG/1
37.5 TABLET ORAL 2 TIMES DAILY
Qty: 60 TABLET | Refills: 0 | Status: SHIPPED | OUTPATIENT
Start: 2018-09-19 | End: 2019-03-11

## 2018-09-19 RX ORDER — TRAMADOL HYDROCHLORIDE 50 MG/1
TABLET ORAL
Qty: 150 TABLET | Refills: 5 | Status: SHIPPED | OUTPATIENT
Start: 2018-09-19 | End: 2019-02-22 | Stop reason: SDUPTHER

## 2018-09-19 RX ORDER — BUSPIRONE HYDROCHLORIDE 15 MG/1
15 TABLET ORAL 2 TIMES DAILY
Qty: 180 TABLET | Refills: 1 | Status: SHIPPED | OUTPATIENT
Start: 2018-09-19 | End: 2019-05-07 | Stop reason: SDUPTHER

## 2018-09-19 RX ORDER — METOPROLOL TARTRATE 25 MG/1
TABLET, FILM COATED ORAL
Qty: 180 TABLET | Refills: 3 | Status: SHIPPED | OUTPATIENT
Start: 2018-09-19 | End: 2019-02-22 | Stop reason: SDUPTHER

## 2018-09-19 RX ORDER — OXYCODONE AND ACETAMINOPHEN 7.5; 325 MG/1; MG/1
TABLET ORAL
COMMUNITY
Start: 2018-08-30 | End: 2019-02-22 | Stop reason: SDUPTHER

## 2018-09-19 NOTE — PROGRESS NOTES
Chief Complaint   Patient presents with    Medication Refill     SUBJECTIVE:   53 y.o. female for annual routine checkup.  Current Outpatient Medications   Medication Sig Dispense Refill    alprazolam (XANAX) 2 MG Tab Take 1 tablet (2 mg total) by mouth nightly as needed. 25 tablet 0    busPIRone (BUSPAR) 15 MG tablet Take 1 tablet (15 mg total) by mouth 2 (two) times daily. 180 tablet 1    butalbital-acetaminophen-caffeine -40 mg (FIORICET, ESGIC) -40 mg per tablet Take 1 tablet by mouth 2 (two) times daily as needed for Headaches. 60 tablet 0    ENDACOF - DM 1-2.5-5 mg/5 mL Soln Give 5 ml (1 teaspoon) BY MOUTH EVERY 6 HOURS FOR 5 DAYS  0    fluticasone (FLONASE) 50 mcg/actuation nasal spray       LYRICA 75 mg capsule TAKE ONE CAPSULE BY MOUTH TWICE DAILY 60 capsule 5    metoprolol tartrate (LOPRESSOR) 25 MG tablet TAKE 1 TABLET BY MOUTH TWICE DAILY (DISCONTINUE  ATENOLOL) 180 tablet 3    oxyCODONE-acetaminophen (PERCOCET) 7.5-325 mg per tablet       polyethylene glycol (COLYTE) 240-22.72-6.72 -5.84 gram SolR Take 4,000 mLs (4 L total) by mouth as needed. 1 Bottle 0    tamoxifen (NOLVADEX) 20 MG Tab Take 20 mg by mouth once daily.       traMADol (ULTRAM) 50 mg tablet TAKE 1 TABLET BY MOUTH EVERY 4 HOURS DURING  THE  DAY  AS  NEEDED  FOR  PAIN  AND  2  TABLETS  AT  NIGHT  AS  NEEDED  FOR  PAIN 150 tablet 5    venlafaxine (EFFEXOR) 37.5 MG Tab Take 1 tablet (37.5 mg total) by mouth 2 (two) times daily. 60 tablet 0     No current facility-administered medications for this visit.      Allergies: Erythromycin   No LMP recorded. Patient is premenopausal.    ROS:  Feeling well. No dyspnea or chest pain on exertion.  No abdominal pain, change in bowel habits, black or bloody stools.  No urinary tract symptoms. GYN ROS: normal menses, no abnormal bleeding, pelvic pain or discharge, no breast pain or new or enlarging lumps on self exam. No neurological complaints.    OBJECTIVE:   The patient appears  "well, alert, oriented x 3, in no distress.  /88   Pulse 78   Temp 98 °F (36.7 °C) (Oral)   Ht 5' 4" (1.626 m)   Wt 81.9 kg (180 lb 8.9 oz)   SpO2 96%   BMI 30.99 kg/m²      ENT normal.  Neck supple. No adenopathy or thyromegaly. GERMAN. Lungs are clear, good air entry, no wheezes, rhonchi or rales. S1 and S2 normal, no murmurs, regular rate and rhythm. Abdomen soft without tenderness, guarding, mass or organomegaly. Extremities show no edema, normal peripheral pulses. Neurological is normal, no focal findings.  No changes in the back and joints.  BREAST EXAM: deferred    PELVIC EXAM: deferred    ASSESSMENT:   1. Annual physical exam    2. Anxiety    3. Essential hypertension    4. Fibromyalgia          PLAN:   Vane was seen today for medication refill.    Diagnoses and all orders for this visit:    Annual physical exam    Anxiety  -     busPIRone (BUSPAR) 15 MG tablet; Take 1 tablet (15 mg total) by mouth 2 (two) times daily.    Essential hypertension  -     metoprolol tartrate (LOPRESSOR) 25 MG tablet; TAKE 1 TABLET BY MOUTH TWICE DAILY (DISCONTINUE  ATENOLOL)    Fibromyalgia  -     traMADol (ULTRAM) 50 mg tablet; TAKE 1 TABLET BY MOUTH EVERY 4 HOURS DURING  THE  DAY  AS  NEEDED  FOR  PAIN  AND  2  TABLETS  AT  NIGHT  AS  NEEDED  FOR  PAIN    Other orders  -     Cancel: Pneumococcal Conjugate Vaccine (13 Valent) (IM)  -     Cancel: Tdap Vaccine  -     Cancel: Pneumococcal Polysaccharide Vaccine (23 Valent) (SQ/IM)  -     Cancel: Influenza - Quadrivalent (3 years & older)  -     venlafaxine (EFFEXOR) 37.5 MG Tab; Take 1 tablet (37.5 mg total) by mouth 2 (two) times daily.      Counseled on age appropriate medical preventative services, including age appropriate cancer screenings, over all nutritional health, need for a consistent exercise regimen and an over all push towards maintaining a vigorous and active lifestyle.  Counseled on age appropriate vaccines and discussed upcoming health care needs based " on age/gender.  Spent time with patient counseling on need for a good patient/doctor relationship moving forward.  Discussed use of common OTC medications and supplements.  Discussed common dietary aids and use of caffeine and the need for good sleep hygiene and stress management.    [employer] was given to patient on 9/20/2018 1:35 PM

## 2018-09-20 ENCOUNTER — TELEPHONE (OUTPATIENT)
Dept: FAMILY MEDICINE | Facility: CLINIC | Age: 53
End: 2018-09-20

## 2018-09-20 DIAGNOSIS — Z12.11 COLON CANCER SCREENING: Primary | ICD-10-CM

## 2018-09-20 NOTE — TELEPHONE ENCOUNTER
----- Message from Alyce Schaffer sent at 2018 10:35 AM CDT -----  This patient's order for colonoscopy has . Please reorder if you want her to continue.

## 2018-09-27 ENCOUNTER — OFFICE VISIT (OUTPATIENT)
Dept: UROLOGY | Facility: CLINIC | Age: 53
End: 2018-09-27
Payer: COMMERCIAL

## 2018-09-27 ENCOUNTER — LAB VISIT (OUTPATIENT)
Dept: LAB | Facility: HOSPITAL | Age: 53
End: 2018-09-27
Attending: PHYSICIAN ASSISTANT
Payer: COMMERCIAL

## 2018-09-27 VITALS
BODY MASS INDEX: 30.54 KG/M2 | DIASTOLIC BLOOD PRESSURE: 68 MMHG | RESPIRATION RATE: 14 BRPM | SYSTOLIC BLOOD PRESSURE: 112 MMHG | HEIGHT: 64 IN | WEIGHT: 178.88 LBS | HEART RATE: 68 BPM

## 2018-09-27 DIAGNOSIS — Z12.11 COLON CANCER SCREENING: ICD-10-CM

## 2018-09-27 DIAGNOSIS — N13.30 HYDRONEPHROSIS OF RIGHT KIDNEY: ICD-10-CM

## 2018-09-27 DIAGNOSIS — N20.0 NEPHROLITHIASIS: Primary | ICD-10-CM

## 2018-09-27 DIAGNOSIS — N20.1 URETERAL CALCULI: ICD-10-CM

## 2018-09-27 LAB — HEMOCCULT STL QL IA: NEGATIVE

## 2018-09-27 PROCEDURE — 81002 URINALYSIS NONAUTO W/O SCOPE: CPT | Mod: S$GLB,,, | Performed by: UROLOGY

## 2018-09-27 PROCEDURE — 3074F SYST BP LT 130 MM HG: CPT | Mod: CPTII,S$GLB,, | Performed by: UROLOGY

## 2018-09-27 PROCEDURE — 3008F BODY MASS INDEX DOCD: CPT | Mod: CPTII,S$GLB,, | Performed by: UROLOGY

## 2018-09-27 PROCEDURE — 82274 ASSAY TEST FOR BLOOD FECAL: CPT

## 2018-09-27 PROCEDURE — 3078F DIAST BP <80 MM HG: CPT | Mod: CPTII,S$GLB,, | Performed by: UROLOGY

## 2018-09-27 PROCEDURE — 99999 PR PBB SHADOW E&M-EST. PATIENT-LVL III: CPT | Mod: PBBFAC,,, | Performed by: UROLOGY

## 2018-09-27 PROCEDURE — 99204 OFFICE O/P NEW MOD 45 MIN: CPT | Mod: 25,S$GLB,, | Performed by: UROLOGY

## 2018-09-27 RX ORDER — TAMSULOSIN HYDROCHLORIDE 0.4 MG/1
0.4 CAPSULE ORAL DAILY
Qty: 20 CAPSULE | Refills: 0 | Status: SHIPPED | OUTPATIENT
Start: 2018-09-27 | End: 2020-08-21

## 2018-09-27 RX ORDER — KETOROLAC TROMETHAMINE 10 MG/1
10 TABLET, FILM COATED ORAL EVERY 6 HOURS
Qty: 20 TABLET | Refills: 0 | Status: SHIPPED | OUTPATIENT
Start: 2018-09-27 | End: 2019-05-07

## 2018-09-27 NOTE — PROGRESS NOTES
"  Subjective:       Vane Seo is a 53 y.o. female who is a new patient who was referred by ER for evaluation of nephrolithiasis.      She was seen in [facility]SH ER yesterday with flank pain and n/v. CT scan (report) shows mild R hydronephrosis 2/2 two adjacent R UVJ stones (4mm and 3mm). No renal stones reported. She reports stones run in her family. No Flomax given from ER. No strainer given.      She has a h/o stones and is s/p ESWL in 2005. Stones initially found in 1999 (while pregnant).      The following portions of the patient's history were reviewed and updated as appropriate: allergies, current medications, past family history, past medical history, past social history, past surgical history and problem list.    Review of Systems  Constitutional: no fever or chills  ENT: no nasal congestion or sore throat  Respiratory: no cough or shortness of breath  Cardiovascular: no chest pain or palpitations  Gastrointestinal: no nausea or vomiting, tolerating diet  Genitourinary: as per HPI  Hematologic/Lymphatic: no easy bruising or lymphadenopathy  Musculoskeletal: no arthralgias or myalgias  Skin: no rashes or lesions  Neurological: no seizures or tremors  Behavioral/Psych: no auditory or visual hallucinations        Objective:    Vitals: /68   Pulse 68   Resp 14   Ht 5' 4" (1.626 m)   Wt 81.1 kg (178 lb 14.5 oz)   BMI 30.71 kg/m²     Physical Exam   General: well developed, well nourished in no acute distress  Head: normocephalic, atraumatic  Neck: supple, trachea midline, no obvious enlargement of thyroid  HEENT: EOMI, mucus membranes moist, sclera anicteric, no hearing impairment  Lungs: symmetric expansion, non-labored breathing  Cardiovascular: regular rate and rhythm, normal pulses  Abdomen: soft, non tender, non distended, no palpable masses, no hepatosplenomegaly, no hernias, no CVA tenderness  Musculoskeletal: no peripheral edema, normal ROM in bilateral upper and lower " extremities  Lymphatics: no cervical or inguinal lymphadenopathy  Skin: no rashes or lesions  Neuro: alert and oriented x 3, no gross deficits  Psych: normal judgment and insight, normal mood/affect and non-anxious  Genitourinary:   patient declined exam      Lab Review   Urine analysis today in clinic shows positive for red blood cells 5-10, protein     Lab Results   Component Value Date    WBC 5.3 09/14/2018    HGB 14.0 09/14/2018    HCT 42.7 09/14/2018    MCV 81.0 09/14/2018     09/14/2018     Lab Results   Component Value Date    CREATININE 0.67 09/14/2018    BUN 14 09/14/2018       Imaging  Images and reports were personally reviewed by me and discussed with patient  CT images reviewed       Assessment/Plan:      1. Nephrolithiasis    - Prior ESWL 2013   - R UVJ stones   - Discussed red flags to go to ER: fever, inability to tolerate PO, pain not controlled by medications.   - Offered URS - pt going out of town - trial MET       2. Hydronephrosis of right kidney    - 2/2 ureteral stones     3. Ureteral calculi    - Two R UVJ stones - 4mm and 3mm   - MET - Flomax given   - Strain urine     4. Renal cysts   - Two large R renal cysts      Follow up in 3-4 weeks

## 2018-09-28 LAB
BILIRUB SERPL-MCNC: NEGATIVE MG/DL
BLOOD URINE, POC: NORMAL
COLOR, POC UA: YELLOW
GLUCOSE UR QL STRIP: NEGATIVE
KETONES UR QL STRIP: NEGATIVE
LEUKOCYTE ESTERASE URINE, POC: NEGATIVE
NITRITE, POC UA: NEGATIVE
PH, POC UA: 6
PROTEIN, POC: NORMAL
SPECIFIC GRAVITY, POC UA: 1025
UROBILINOGEN, POC UA: NEGATIVE

## 2019-02-15 ENCOUNTER — PATIENT MESSAGE (OUTPATIENT)
Dept: ADMINISTRATIVE | Facility: HOSPITAL | Age: 54
End: 2019-02-15

## 2019-02-15 ENCOUNTER — PATIENT OUTREACH (OUTPATIENT)
Dept: ADMINISTRATIVE | Facility: HOSPITAL | Age: 54
End: 2019-02-15

## 2019-02-22 ENCOUNTER — OFFICE VISIT (OUTPATIENT)
Dept: FAMILY MEDICINE | Facility: CLINIC | Age: 54
End: 2019-02-22
Payer: COMMERCIAL

## 2019-02-22 VITALS
TEMPERATURE: 99 F | HEIGHT: 64 IN | OXYGEN SATURATION: 97 % | HEART RATE: 73 BPM | BODY MASS INDEX: 29.99 KG/M2 | WEIGHT: 175.69 LBS

## 2019-02-22 DIAGNOSIS — I10 ESSENTIAL HYPERTENSION: ICD-10-CM

## 2019-02-22 DIAGNOSIS — F41.9 ANXIETY: ICD-10-CM

## 2019-02-22 DIAGNOSIS — M79.7 FIBROMYALGIA: ICD-10-CM

## 2019-02-22 DIAGNOSIS — Z82.49 FAMILY HISTORY OF DISEASE OF AORTA: Primary | ICD-10-CM

## 2019-02-22 PROCEDURE — 3008F BODY MASS INDEX DOCD: CPT | Mod: CPTII,S$GLB,, | Performed by: FAMILY MEDICINE

## 2019-02-22 PROCEDURE — 99999 PR PBB SHADOW E&M-EST. PATIENT-LVL III: ICD-10-PCS | Mod: PBBFAC,,, | Performed by: FAMILY MEDICINE

## 2019-02-22 PROCEDURE — 99214 OFFICE O/P EST MOD 30 MIN: CPT | Mod: S$GLB,,, | Performed by: FAMILY MEDICINE

## 2019-02-22 PROCEDURE — 3008F PR BODY MASS INDEX (BMI) DOCUMENTED: ICD-10-PCS | Mod: CPTII,S$GLB,, | Performed by: FAMILY MEDICINE

## 2019-02-22 PROCEDURE — 99214 PR OFFICE/OUTPT VISIT, EST, LEVL IV, 30-39 MIN: ICD-10-PCS | Mod: S$GLB,,, | Performed by: FAMILY MEDICINE

## 2019-02-22 PROCEDURE — 99999 PR PBB SHADOW E&M-EST. PATIENT-LVL III: CPT | Mod: PBBFAC,,, | Performed by: FAMILY MEDICINE

## 2019-02-22 RX ORDER — ALPRAZOLAM 2 MG/1
2 TABLET ORAL NIGHTLY PRN
Qty: 25 TABLET | Refills: 1 | Status: SHIPPED | OUTPATIENT
Start: 2019-02-22 | End: 2020-08-21 | Stop reason: ALTCHOICE

## 2019-02-22 RX ORDER — FLUTICASONE PROPIONATE 50 MCG
1 SPRAY, SUSPENSION (ML) NASAL 2 TIMES DAILY
Qty: 16 G | Refills: 5 | Status: SHIPPED | OUTPATIENT
Start: 2019-02-22 | End: 2020-08-21

## 2019-02-22 RX ORDER — ANASTROZOLE 1 MG/1
TABLET ORAL
COMMUNITY
Start: 2019-02-07 | End: 2019-05-07

## 2019-02-22 RX ORDER — PREGABALIN 150 MG/1
150 CAPSULE ORAL 2 TIMES DAILY
Qty: 60 CAPSULE | Refills: 5 | Status: SHIPPED | OUTPATIENT
Start: 2019-02-22 | End: 2019-10-05 | Stop reason: SDUPTHER

## 2019-02-22 RX ORDER — TRAMADOL HYDROCHLORIDE 50 MG/1
TABLET ORAL
Qty: 150 TABLET | Refills: 5 | Status: SHIPPED | OUTPATIENT
Start: 2019-02-22 | End: 2019-09-06 | Stop reason: SDUPTHER

## 2019-02-22 RX ORDER — METOPROLOL TARTRATE 25 MG/1
TABLET, FILM COATED ORAL
Qty: 180 TABLET | Refills: 3 | Status: SHIPPED | OUTPATIENT
Start: 2019-02-22 | End: 2019-10-05 | Stop reason: SDUPTHER

## 2019-02-22 NOTE — PROGRESS NOTES
Chief Complaint   Patient presents with    Medication Refill       SUBJECTIVE:  Vane Seo is a 53 y.o. female here for follow up of chronic pain.   Patient has chronic pain involving the multiple muscles and joints.  Conservative treatment with tylenol, NSAIDs, alternative treatments, complementary non opioid nerve/epileptic medication has failed with primary use.  Now opioid dependent for relief.  Has tried PT/OT, injections with mixed success.    Allodynia unclear if it is here  Central pain sensitization noted       Currently has co-morbidities including below problem list.      Social History     Tobacco Use    Smoking status: Former Smoker    Smokeless tobacco: Never Used    Tobacco comment: 1993   Substance Use Topics    Alcohol use: No    Drug use: No       Patient Active Problem List    Diagnosis Date Noted    Nephrolithiasis 09/27/2018    Chest pain     Anxiety 09/25/2017    Essential hypertension 04/04/2017    Fibromyalgia 09/04/2015 4/17 start norvasc 2.5, increase monthly by 2.5 to 10 mg.      History of pilonidal cyst 09/04/2015    Menstrual migraine without status migrainosus, not intractable 09/04/2015    Mood disorder 09/04/2015    Breast cancer 02/04/2014     Still on tamoxifen (2019)  mina for cancer, Vichot for arthritis         Review of Systems   Constitutional: Positive for malaise/fatigue. Negative for chills, diaphoresis, fever and weight loss.   HENT: Negative.    Eyes: Negative.    Respiratory: Negative.    Cardiovascular: Negative.    Gastrointestinal: Negative.    Genitourinary: Negative.    Musculoskeletal: Positive for joint pain and myalgias. Negative for back pain, falls and neck pain.   Skin: Negative.    Neurological: Negative.  Negative for weakness.   Endo/Heme/Allergies: Negative.    Psychiatric/Behavioral: Positive for depression. Negative for hallucinations, memory loss, substance abuse and suicidal ideas. The patient is nervous/anxious and has  "insomnia.        OBJECTIVE:  Pulse 73   Temp 98.5 °F (36.9 °C) (Oral)   Ht 5' 3.5" (1.613 m)   Wt 79.7 kg (175 lb 11.3 oz)   SpO2 97%   BMI 30.64 kg/m²     Wt Readings from Last 3 Encounters:   02/22/19 79.7 kg (175 lb 11.3 oz)   09/27/18 81.1 kg (178 lb 14.5 oz)   09/19/18 81.9 kg (180 lb 8.9 oz)     BP Readings from Last 3 Encounters:   09/27/18 112/68   09/19/18 120/88   03/31/18 (!) 146/82       Patient is in usual state of health, alert and oriented without signs of intoxication.  No evidence on exam of illegal substance use or concerning symptoms involving abuse or intoxication (specifically evidence of IVDU, unusual bruising, slurred speech, unusual explanations).  Dentition is normal for patient without acute change.  Heart and lung exam are unremarkable.  Chronic pain sites are unchanged from prior exam.  No new focal neurological deficits noted.    Functional assessment:    PEG-3 12    ABerrant behavior:    None noted today    Review of old Records:    Reviewed per epic and [facility]    Review of old labs:    Lab Results   Component Value Date    TSH 2.05 09/14/2018     Lab Results   Component Value Date    WBC 5.3 09/14/2018    HGB 14.0 09/14/2018    HCT 42.7 09/14/2018    MCV 81.0 09/14/2018     09/14/2018       Chemistry        Component Value Date/Time     09/14/2018 1136    K 4.5 09/14/2018 1136     09/14/2018 1136    CO2 30 09/14/2018 1136    BUN 14 09/14/2018 1136    CREATININE 0.67 09/14/2018 1136    GLU 74 09/14/2018 1136        Component Value Date/Time    CALCIUM 9.5 09/14/2018 1136    ALKPHOS 100 09/14/2018 1136    AST 17 09/14/2018 1136    ALT 19 09/14/2018 1136    BILITOT 0.6 09/14/2018 1136    ESTGFRAFRICA 117 09/14/2018 1136    EGFRNONAA 101 09/14/2018 1136        Lab Results   Component Value Date    CHOL 204 (H) 09/14/2018    CHOL 180 09/04/2015     Lab Results   Component Value Date    HDL 57 09/14/2018    HDL 51 09/04/2015     Lab Results   Component Value Date    " LDLCALC 124 (H) 09/14/2018    LDLCALC 100.2 09/04/2015     Lab Results   Component Value Date    TRIG 119 09/14/2018    TRIG 144 09/04/2015     Lab Results   Component Value Date    CHOLHDL 3.6 09/14/2018    CHOLHDL 28.3 09/04/2015         Review of old imaging:  Reviewed imaging in epic over last 3 months.      ASSESSMENT/plan    ICD-10-CM ICD-9-CM   1. Family history of disease of aorta Z82.49 V17.49   2. Fibromyalgia M79.7 729.1   3. Anxiety F41.9 300.00   4. Essential hypertension I10 401.9       No evidence of abuse/diversion/addiction noted through history and physical, or checking the .  Risks, benefits and alternate treatments are considered and plan of care reflects that shared decision with the patient.    Referred patient to CDC.GOV to review the new opioid guidelines.  Explained there is a section for patient information that is very informative about the potential risks of chronic opioid therapy and some strategies to minimize risk.  Counseled to f/u with me with any questions or concerns as our goal is always to restore function and  Reducing pain while minimizing side effects and avoiding rare but life threatening risks of overdose/addiction/respiratory suppression.    Continue with current care       High risk medication review completed per guidelines to insure safest use of medication.    We reviewed our goals of care:    Functional restoration  Keep her in her own mind    And discontinuation    REASONS to DISCONTINUE:  >50 MME daily needed to get benefits.  Intolerable side effects.  abhorrent or distasteful behavior towards myself or staff in regards to medication.  Failure to provide drug screens when prompted.  Any criminal investigations or allegations in regards to narcotics.  Illegal selling or distribution of his medication  Failure to progress to functional restoration    Future Appointments   Date Time Provider Department Center   3/11/2019  9:05 AM Fernando Metz MD [facility]    3/11/2019 10:00 AM MAMMOGRAM SCHEDULE - [facility]       Medication List with Changes/Refills   Current Medications    ANASTROZOLE (ARIMIDEX) 1 MG TAB        BUSPIRONE (BUSPAR) 15 MG TABLET    Take 1 tablet (15 mg total) by mouth 2 (two) times daily.    BUTALBITAL-ACETAMINOPHEN-CAFFEINE -40 MG (FIORICET, ESGIC) -40 MG PER TABLET    Take 1 tablet by mouth 2 (two) times daily as needed for Headaches.    ENDACOF - DM 1-2.5-5 MG/5 ML SOLN    Give 5 ml (1 teaspoon) BY MOUTH EVERY 6 HOURS FOR 5 DAYS    KETOROLAC (TORADOL) 10 MG TABLET    Take 1 tablet (10 mg total) by mouth every 6 (six) hours.    LYRICA 75 MG CAPSULE    TAKE ONE CAPSULE BY MOUTH TWICE DAILY    OXYCODONE-ACETAMINOPHEN (PERCOCET) 7.5-325 MG PER TABLET        POLYETHYLENE GLYCOL (COLYTE) 240-22.72-6.72 -5.84 GRAM SOLR    Take 4,000 mLs (4 L total) by mouth as needed.    TAMSULOSIN (FLOMAX) 0.4 MG CAP    Take 1 capsule (0.4 mg total) by mouth once daily.    VENLAFAXINE (EFFEXOR) 37.5 MG TAB    Take 1 tablet (37.5 mg total) by mouth 2 (two) times daily.   Changed and/or Refilled Medications    Modified Medication Previous Medication    ALPRAZOLAM (XANAX) 2 MG TAB alprazolam (XANAX) 2 MG Tab       Take 1 tablet (2 mg total) by mouth nightly as needed.    Take 1 tablet (2 mg total) by mouth nightly as needed.    FLUTICASONE (FLONASE) 50 MCG/ACTUATION NASAL SPRAY fluticasone (FLONASE) 50 mcg/actuation nasal spray       1 spray (50 mcg total) by Each Nare route 2 (two) times daily.        METOPROLOL TARTRATE (LOPRESSOR) 25 MG TABLET metoprolol tartrate (LOPRESSOR) 25 MG tablet       TAKE 1 TABLET BY MOUTH TWICE DAILY (DISCONTINUE  ATENOLOL)    TAKE 1 TABLET BY MOUTH TWICE DAILY (DISCONTINUE  ATENOLOL)    TRAMADOL (ULTRAM) 50 MG TABLET traMADol (ULTRAM) 50 mg tablet       TAKE 1 TABLET BY MOUTH EVERY 4 HOURS DURING  THE  DAY  AS  NEEDED  FOR  PAIN  AND  2  TABLETS  AT  NIGHT  AS  NEEDED  FOR  PAIN    TAKE 1 TABLET BY MOUTH EVERY 4 HOURS  DURING  THE  DAY  AS  NEEDED  FOR  PAIN  AND  2  TABLETS  AT  NIGHT  AS  NEEDED  FOR  PAIN   Discontinued Medications    TAMOXIFEN (NOLVADEX) 20 MG TAB    Take 20 mg by mouth once daily.        3 months or sooner as needed

## 2019-03-12 ENCOUNTER — HOSPITAL ENCOUNTER (OUTPATIENT)
Dept: RADIOLOGY | Facility: HOSPITAL | Age: 54
Discharge: HOME | End: 2019-03-12
Attending: FAMILY MEDICINE
Payer: COMMERCIAL

## 2019-03-12 DIAGNOSIS — Z82.49 FAMILY HISTORY OF DISEASE OF AORTA: ICD-10-CM

## 2019-03-12 PROCEDURE — 76775 US EXAM ABDO BACK WALL LIM: CPT | Mod: TC

## 2019-03-12 PROCEDURE — 76775 US EXAM ABDO BACK WALL LIM: CPT | Mod: 26,,, | Performed by: RADIOLOGY

## 2019-03-12 PROCEDURE — 76775 US ABDOMINAL AORTA: ICD-10-PCS | Mod: 26,,, | Performed by: RADIOLOGY

## 2019-03-20 ENCOUNTER — TELEPHONE (OUTPATIENT)
Dept: FAMILY MEDICINE | Facility: CLINIC | Age: 54
End: 2019-03-20

## 2019-04-11 ENCOUNTER — TELEPHONE (OUTPATIENT)
Dept: FAMILY MEDICINE | Facility: CLINIC | Age: 54
End: 2019-04-11

## 2019-04-11 DIAGNOSIS — R05.9 COUGH: Primary | ICD-10-CM

## 2019-04-11 NOTE — TELEPHONE ENCOUNTER
Return call to Pt, and Pt wants to try and schedule appointment. Pt  states that his wife went to [facility] and was seen and they diagnosed her with Bronchitis but still is coughing. Pt says they will call back to schedule appointment when he gets home.

## 2019-04-11 NOTE — TELEPHONE ENCOUNTER
----- Message from Juliet Jha sent at 4/11/2019  2:01 PM CDT -----  Contact: Kike Seo (Spouse)  Type: Patient Call Back    Who called:     What is the request in detail: Pt  calling to speak to a nurse regarding possible bronchitis. Pt is not feeling good.      Best call back number: [phone]

## 2019-04-12 NOTE — TELEPHONE ENCOUNTER
"Return call to Pt, and she is asking for a Rx for a nebulizer machine with solution and tubing. She made appointment for 5/7/19, but requesting earlier appointment with Dr. Mcgraw only. Pt states," I'm coughing and it won't stop. I have a inhaler but the machine works better". Please advise.  "

## 2019-04-13 NOTE — TELEPHONE ENCOUNTER
No she doesn't meet criteria for the nebulizer, we need to find out what is going on, will set her up with a spirometry test to assess her lungs first.

## 2019-04-15 NOTE — TELEPHONE ENCOUNTER
Return call to Pt, she states that she has Bronchitis and she has an inhaler but that a nebulizer tx works better for her than the inhaler. That she would like a nebulizer ordered for her.

## 2019-05-07 ENCOUNTER — OFFICE VISIT (OUTPATIENT)
Dept: FAMILY MEDICINE | Facility: CLINIC | Age: 54
End: 2019-05-07
Payer: COMMERCIAL

## 2019-05-07 VITALS
BODY MASS INDEX: 29.02 KG/M2 | HEIGHT: 65 IN | SYSTOLIC BLOOD PRESSURE: 120 MMHG | OXYGEN SATURATION: 97 % | TEMPERATURE: 98 F | WEIGHT: 174.19 LBS | DIASTOLIC BLOOD PRESSURE: 70 MMHG | HEART RATE: 80 BPM

## 2019-05-07 DIAGNOSIS — M79.7 FIBROMYALGIA: ICD-10-CM

## 2019-05-07 DIAGNOSIS — F39 MOOD DISORDER: ICD-10-CM

## 2019-05-07 DIAGNOSIS — F41.9 ANXIETY: ICD-10-CM

## 2019-05-07 DIAGNOSIS — I10 ESSENTIAL HYPERTENSION: Primary | ICD-10-CM

## 2019-05-07 PROCEDURE — 99999 PR PBB SHADOW E&M-EST. PATIENT-LVL III: CPT | Mod: PBBFAC,,, | Performed by: FAMILY MEDICINE

## 2019-05-07 PROCEDURE — 3008F PR BODY MASS INDEX (BMI) DOCUMENTED: ICD-10-PCS | Mod: CPTII,S$GLB,, | Performed by: FAMILY MEDICINE

## 2019-05-07 PROCEDURE — 3078F PR MOST RECENT DIASTOLIC BLOOD PRESSURE < 80 MM HG: ICD-10-PCS | Mod: CPTII,S$GLB,, | Performed by: FAMILY MEDICINE

## 2019-05-07 PROCEDURE — 3074F PR MOST RECENT SYSTOLIC BLOOD PRESSURE < 130 MM HG: ICD-10-PCS | Mod: CPTII,S$GLB,, | Performed by: FAMILY MEDICINE

## 2019-05-07 PROCEDURE — 99999 PR PBB SHADOW E&M-EST. PATIENT-LVL III: ICD-10-PCS | Mod: PBBFAC,,, | Performed by: FAMILY MEDICINE

## 2019-05-07 PROCEDURE — 99214 PR OFFICE/OUTPT VISIT, EST, LEVL IV, 30-39 MIN: ICD-10-PCS | Mod: S$GLB,,, | Performed by: FAMILY MEDICINE

## 2019-05-07 PROCEDURE — 3008F BODY MASS INDEX DOCD: CPT | Mod: CPTII,S$GLB,, | Performed by: FAMILY MEDICINE

## 2019-05-07 PROCEDURE — 99214 OFFICE O/P EST MOD 30 MIN: CPT | Mod: S$GLB,,, | Performed by: FAMILY MEDICINE

## 2019-05-07 PROCEDURE — 3078F DIAST BP <80 MM HG: CPT | Mod: CPTII,S$GLB,, | Performed by: FAMILY MEDICINE

## 2019-05-07 PROCEDURE — 3074F SYST BP LT 130 MM HG: CPT | Mod: CPTII,S$GLB,, | Performed by: FAMILY MEDICINE

## 2019-05-07 RX ORDER — BUSPIRONE HYDROCHLORIDE 15 MG/1
15 TABLET ORAL 2 TIMES DAILY
Qty: 180 TABLET | Refills: 1 | Status: SHIPPED | OUTPATIENT
Start: 2019-05-07 | End: 2019-10-05 | Stop reason: SDUPTHER

## 2019-05-07 NOTE — PROGRESS NOTES
Chief Complaint   Patient presents with    Cough     Chief Complaint   Patient presents with    Cough       SUBJECTIVE:  Vane Seo is a 53 y.o. female here for new problem of cough and she is tired of taking her medications and she just wants to get away and we want to try medicinal THC instead.  She has not started arimidex because of the fear it will cause her more mood issues.  She has started working out with her daughter though to help and her allergies are kicking up.  Currently has co-morbidities including per problem list.      Past Medical History:   Diagnosis Date    Cancer     Breast cancer  4 years ago.     Fibromyalgia      Past Surgical History:   Procedure Laterality Date    BREAST SURGERY      2015  rt. breast removed.       SECTION      2 c sections.       Social History     Socioeconomic History    Marital status:      Spouse name: Not on file    Number of children: Not on file    Years of education: Not on file    Highest education level: Not on file   Occupational History    Not on file   Social Needs    Financial resource strain: Not on file    Food insecurity:     Worry: Not on file     Inability: Not on file    Transportation needs:     Medical: Not on file     Non-medical: Not on file   Tobacco Use    Smoking status: Former Smoker    Smokeless tobacco: Never Used    Tobacco comment: 1993   Substance and Sexual Activity    Alcohol use: No    Drug use: No    Sexual activity: Yes     Partners: Male   Lifestyle    Physical activity:     Days per week: Not on file     Minutes per session: Not on file    Stress: Not on file   Relationships    Social connections:     Talks on phone: Not on file     Gets together: Not on file     Attends Jainism service: Not on file     Active member of club or organization: Not on file     Attends meetings of clubs or organizations: Not on file     Relationship status: Not on file   Other Topics Concern    Not  on file   Social History Narrative    Not on file     Family History   Problem Relation Age of Onset    Asthma Mother     Arthritis Mother     Heart disease Mother     Hypertension Mother     Kidney disease Mother     Stroke Mother     Heart disease Father     Hypertension Other     Colon cancer Paternal Grandfather      Current Outpatient Medications on File Prior to Visit   Medication Sig Dispense Refill    butalbital-acetaminophen-caffeine -40 mg (FIORICET, ESGIC) -40 mg per tablet Take 1 tablet by mouth 2 (two) times daily as needed for Headaches. 60 tablet 0    fluticasone (FLONASE) 50 mcg/actuation nasal spray 1 spray (50 mcg total) by Each Nare route 2 (two) times daily. 16 g 5    metoprolol tartrate (LOPRESSOR) 25 MG tablet TAKE 1 TABLET BY MOUTH TWICE DAILY (DISCONTINUE  ATENOLOL) 180 tablet 3    pregabalin (LYRICA) 150 MG capsule Take 1 capsule (150 mg total) by mouth 2 (two) times daily. 60 capsule 5    traMADol (ULTRAM) 50 mg tablet TAKE 1 TABLET BY MOUTH EVERY 4 HOURS DURING  THE  DAY  AS  NEEDED  FOR  PAIN  AND  2  TABLETS  AT  NIGHT  AS  NEEDED  FOR  PAIN 150 tablet 5    [DISCONTINUED] anastrozole (ARIMIDEX) 1 mg Tab       [DISCONTINUED] atenolol (TENORMIN) 25 MG tablet Take 25 mg by mouth.      [DISCONTINUED] busPIRone (BUSPAR) 15 MG tablet Take 1 tablet (15 mg total) by mouth 2 (two) times daily. 180 tablet 1    [DISCONTINUED] ketorolac (TORADOL) 10 mg tablet Take 1 tablet (10 mg total) by mouth every 6 (six) hours. 20 tablet 0    ALPRAZolam (XANAX) 2 MG Tab Take 1 tablet (2 mg total) by mouth nightly as needed. 25 tablet 1    tamsulosin (FLOMAX) 0.4 mg Cap Take 1 capsule (0.4 mg total) by mouth once daily. 20 capsule 0     No current facility-administered medications on file prior to visit.      Review of patient's allergies indicates:   Allergen Reactions    Erythromycin Other (See Comments)     convulsions    Cetylpyridinium-benzocaine          ROS  Per  "HPI    OBJECTIVE:  /70   Pulse 80   Temp 98.2 °F (36.8 °C) (Oral)   Ht 5' 5" (1.651 m)   Wt 79 kg (174 lb 2.6 oz)   SpO2 97%   BMI 28.98 kg/m²     Wt Readings from Last 3 Encounters:   05/07/19 79 kg (174 lb 2.6 oz)   03/11/19 80.3 kg (177 lb)   02/22/19 79.7 kg (175 lb 11.3 oz)     BP Readings from Last 3 Encounters:   05/07/19 120/70   03/11/19 (!) 150/98   09/27/18 112/68       She appears well, in no apparent distress.  Alert and oriented times three, pleasant and cooperative. Vital signs are as documented in vital signs section.  She has pain in her muscles, she is exercising  S1 and S2 normal, no murmurs, clicks, gallops or rubs. Regular rate and rhythm. Chest is clear; no wheezes or rales. No edema or JVD.      Review of old Records:  Reviewed     Review of old labs:  Reviewed last labs    Review of old imaging:  Reviewed prior imaging    ASSESSMENT:  Problem List Items Addressed This Visit     Mood disorder    Fibromyalgia    Essential hypertension - Primary    Anxiety    Relevant Medications    busPIRone (BUSPAR) 15 MG tablet          ICD-10-CM ICD-9-CM   1. Essential hypertension I10 401.9   2. Anxiety F41.9 300.00   3. Mood disorder F39 296.90   4. Fibromyalgia M79.7 729.1         PLAN:  1. Anxiety  Potential medication side effects were discussed with the patient; let me know if any occur.    - busPIRone (BUSPAR) 15 MG tablet; Take 1 tablet (15 mg total) by mouth 2 (two) times daily.  Dispense: 180 tablet; Refill: 1    2. Mood disorder  The current medical regimen is effective;  continue present plan and medications.      3. Essential hypertension  The current medical regimen is effective;  continue present plan and medications.      4. Fibromyalgia  The current medical regimen is effective;  continue present plan and medications.  She is still having inflammation          Medication List with Changes/Refills   Current Medications    ALPRAZOLAM (XANAX) 2 MG TAB    Take 1 tablet (2 mg " total) by mouth nightly as needed.    BUTALBITAL-ACETAMINOPHEN-CAFFEINE -40 MG (FIORICET, ESGIC) -40 MG PER TABLET    Take 1 tablet by mouth 2 (two) times daily as needed for Headaches.    FLUTICASONE (FLONASE) 50 MCG/ACTUATION NASAL SPRAY    1 spray (50 mcg total) by Each Nare route 2 (two) times daily.    METOPROLOL TARTRATE (LOPRESSOR) 25 MG TABLET    TAKE 1 TABLET BY MOUTH TWICE DAILY (DISCONTINUE  ATENOLOL)    PREGABALIN (LYRICA) 150 MG CAPSULE    Take 1 capsule (150 mg total) by mouth 2 (two) times daily.    TAMSULOSIN (FLOMAX) 0.4 MG CAP    Take 1 capsule (0.4 mg total) by mouth once daily.    TRAMADOL (ULTRAM) 50 MG TABLET    TAKE 1 TABLET BY MOUTH EVERY 4 HOURS DURING  THE  DAY  AS  NEEDED  FOR  PAIN  AND  2  TABLETS  AT  NIGHT  AS  NEEDED  FOR  PAIN   Changed and/or Refilled Medications    Modified Medication Previous Medication    BUSPIRONE (BUSPAR) 15 MG TABLET busPIRone (BUSPAR) 15 MG tablet       Take 1 tablet (15 mg total) by mouth 2 (two) times daily.    Take 1 tablet (15 mg total) by mouth 2 (two) times daily.   Discontinued Medications    ANASTROZOLE (ARIMIDEX) 1 MG TAB        ATENOLOL (TENORMIN) 25 MG TABLET    Take 25 mg by mouth.    KETOROLAC (TORADOL) 10 MG TABLET    Take 1 tablet (10 mg total) by mouth every 6 (six) hours.       Follow up in about 3 months (around 8/7/2019) for assess treatment plan.

## 2019-09-06 DIAGNOSIS — M79.7 FIBROMYALGIA: ICD-10-CM

## 2019-09-09 RX ORDER — TRAMADOL HYDROCHLORIDE 50 MG/1
TABLET ORAL
Qty: 150 TABLET | Refills: 5 | Status: SHIPPED | OUTPATIENT
Start: 2019-09-09 | End: 2020-03-17

## 2019-09-12 ENCOUNTER — PATIENT OUTREACH (OUTPATIENT)
Dept: ADMINISTRATIVE | Facility: OTHER | Age: 54
End: 2019-09-12

## 2019-09-13 ENCOUNTER — OFFICE VISIT (OUTPATIENT)
Dept: OPTOMETRY | Facility: CLINIC | Age: 54
End: 2019-09-13
Payer: COMMERCIAL

## 2019-09-13 DIAGNOSIS — H11.153 PINGUECULA, BILATERAL: Primary | ICD-10-CM

## 2019-09-13 DIAGNOSIS — H04.123 DRY EYE SYNDROME OF BOTH EYES: ICD-10-CM

## 2019-09-13 PROCEDURE — 99999 PR PBB SHADOW E&M-EST. PATIENT-LVL I: CPT | Mod: PBBFAC,,, | Performed by: OPTOMETRIST

## 2019-09-13 PROCEDURE — 92002 PR EYE EXAM, NEW PATIENT,INTERMED: ICD-10-PCS | Mod: S$GLB,,, | Performed by: OPTOMETRIST

## 2019-09-13 PROCEDURE — 99999 PR PBB SHADOW E&M-EST. PATIENT-LVL I: ICD-10-PCS | Mod: PBBFAC,,, | Performed by: OPTOMETRIST

## 2019-09-13 PROCEDURE — 92002 INTRM OPH EXAM NEW PATIENT: CPT | Mod: S$GLB,,, | Performed by: OPTOMETRIST

## 2019-09-13 NOTE — PROGRESS NOTES
Subjective:       Patient ID: Vane Seo is a 54 y.o. female      Chief Complaint   Patient presents with    Eye Problem     History of Present Illness  Dls: 1-2 yrs     55 y/o female presents today with c/o x 2 days ago wore cls for 5 hrs when taking cls out red ou notice bump on os no tearing no itching no burning no pain no changes in vision. Pt has not worn cls in 2 days. Pt used visine ou.         Assessment/Plan:     1. Pinguecula, bilateral  Recommend UV protection. AT PRN. Monitor.     2. Dry eye syndrome of both eyes  Artificial tears QID OU.    Pt advised to discard old lenses.    Follow up if symptoms worsen or fail to improve.

## 2019-10-05 DIAGNOSIS — Z00.00 ANNUAL PHYSICAL EXAM: Primary | ICD-10-CM

## 2019-10-05 DIAGNOSIS — N39.0 URINARY TRACT INFECTION WITHOUT HEMATURIA, SITE UNSPECIFIED: Primary | ICD-10-CM

## 2019-10-05 DIAGNOSIS — M79.7 FIBROMYALGIA: ICD-10-CM

## 2019-10-05 DIAGNOSIS — I10 ESSENTIAL HYPERTENSION: ICD-10-CM

## 2019-10-05 DIAGNOSIS — Z17.0 MALIGNANT NEOPLASM OF RIGHT BREAST IN FEMALE, ESTROGEN RECEPTOR POSITIVE, UNSPECIFIED SITE OF BREAST: ICD-10-CM

## 2019-10-05 DIAGNOSIS — F41.9 ANXIETY: ICD-10-CM

## 2019-10-05 DIAGNOSIS — C50.911 MALIGNANT NEOPLASM OF RIGHT BREAST IN FEMALE, ESTROGEN RECEPTOR POSITIVE, UNSPECIFIED SITE OF BREAST: ICD-10-CM

## 2019-10-05 RX ORDER — BUSPIRONE HYDROCHLORIDE 15 MG/1
15 TABLET ORAL 2 TIMES DAILY
Qty: 180 TABLET | Refills: 1 | Status: SHIPPED | OUTPATIENT
Start: 2019-10-05 | End: 2020-01-07 | Stop reason: SDUPTHER

## 2019-10-05 RX ORDER — PREGABALIN 150 MG/1
150 CAPSULE ORAL 2 TIMES DAILY
Qty: 60 CAPSULE | Refills: 5 | Status: SHIPPED | OUTPATIENT
Start: 2019-10-05 | End: 2020-05-21

## 2019-10-05 RX ORDER — NITROFURANTOIN 25; 75 MG/1; MG/1
100 CAPSULE ORAL 2 TIMES DAILY
Qty: 10 CAPSULE | Refills: 0 | Status: SHIPPED | OUTPATIENT
Start: 2019-10-05 | End: 2019-10-10

## 2019-10-05 RX ORDER — METOPROLOL TARTRATE 25 MG/1
TABLET, FILM COATED ORAL
Qty: 180 TABLET | Refills: 3 | Status: SHIPPED | OUTPATIENT
Start: 2019-10-05 | End: 2020-08-21 | Stop reason: SDUPTHER

## 2019-10-22 ENCOUNTER — OFFICE VISIT (OUTPATIENT)
Dept: FAMILY MEDICINE | Facility: CLINIC | Age: 54
End: 2019-10-22
Payer: COMMERCIAL

## 2019-10-22 VITALS
OXYGEN SATURATION: 98 % | HEART RATE: 83 BPM | SYSTOLIC BLOOD PRESSURE: 103 MMHG | TEMPERATURE: 98 F | BODY MASS INDEX: 32.46 KG/M2 | WEIGHT: 183.19 LBS | RESPIRATION RATE: 19 BRPM | HEIGHT: 63 IN | DIASTOLIC BLOOD PRESSURE: 86 MMHG

## 2019-10-22 DIAGNOSIS — S80.861A INFECTED INSECT BITE OF RIGHT LEG, INITIAL ENCOUNTER: Primary | ICD-10-CM

## 2019-10-22 DIAGNOSIS — L08.9 INFECTED INSECT BITE OF RIGHT LEG, INITIAL ENCOUNTER: Primary | ICD-10-CM
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
## 2019-10-22 PROCEDURE — 3008F PR BODY MASS INDEX (BMI) DOCUMENTED: ICD-10-PCS | Mod: CPTII,S$GLB,, | Performed by: PHYSICIAN ASSISTANT

## 2019-10-22 PROCEDURE — 99999 PR PBB SHADOW E&M-EST. PATIENT-LVL IV: ICD-10-PCS | Mod: PBBFAC,,, | Performed by: PHYSICIAN ASSISTANT

## 2019-10-22 PROCEDURE — 87070 CULTURE OTHR SPECIMN AEROBIC: CPT

## 2019-10-22 PROCEDURE — 87077 CULTURE AEROBIC IDENTIFY: CPT

## 2019-10-22 PROCEDURE — 99214 PR OFFICE/OUTPT VISIT, EST, LEVL IV, 30-39 MIN: ICD-10-PCS | Mod: S$GLB,,, | Performed by: PHYSICIAN ASSISTANT

## 2019-10-22 PROCEDURE — 3079F DIAST BP 80-89 MM HG: CPT | Mod: CPTII,S$GLB,, | Performed by: PHYSICIAN ASSISTANT

## 2019-10-22 PROCEDURE — 3079F PR MOST RECENT DIASTOLIC BLOOD PRESSURE 80-89 MM HG: ICD-10-PCS | Mod: CPTII,S$GLB,, | Performed by: PHYSICIAN ASSISTANT

## 2019-10-22 PROCEDURE — 3074F PR MOST RECENT SYSTOLIC BLOOD PRESSURE < 130 MM HG: ICD-10-PCS | Mod: CPTII,S$GLB,, | Performed by: PHYSICIAN ASSISTANT

## 2019-10-22 PROCEDURE — 3074F SYST BP LT 130 MM HG: CPT | Mod: CPTII,S$GLB,, | Performed by: PHYSICIAN ASSISTANT

## 2019-10-22 PROCEDURE — 87186 SC STD MICRODIL/AGAR DIL: CPT

## 2019-10-22 PROCEDURE — 99999 PR PBB SHADOW E&M-EST. PATIENT-LVL IV: CPT | Mod: PBBFAC,,, | Performed by: PHYSICIAN ASSISTANT

## 2019-10-22 PROCEDURE — 3008F BODY MASS INDEX DOCD: CPT | Mod: CPTII,S$GLB,, | Performed by: PHYSICIAN ASSISTANT

## 2019-10-22 PROCEDURE — 99214 OFFICE O/P EST MOD 30 MIN: CPT | Mod: S$GLB,,, | Performed by: PHYSICIAN ASSISTANT

## 2019-10-22 RX ORDER — IBUPROFEN 600 MG/1
600 TABLET ORAL EVERY 8 HOURS PRN
Qty: 40 TABLET | Refills: 0 | Status: SHIPPED | OUTPATIENT
Start: 2019-10-22 | End: 2019-11-01

## 2019-10-22 RX ORDER — IBUPROFEN 600 MG/1
600 TABLET ORAL EVERY 8 HOURS PRN
Qty: 40 TABLET | Refills: 0 | Status: SHIPPED | OUTPATIENT
Start: 2019-10-22 | End: 2019-10-22 | Stop reason: SDUPTHER

## 2019-10-22 RX ORDER — SULFAMETHOXAZOLE AND TRIMETHOPRIM 800; 160 MG/1; MG/1
1 TABLET ORAL 2 TIMES DAILY
Qty: 20 TABLET | Refills: 0 | Status: SHIPPED | OUTPATIENT
Start: 2019-10-22 | End: 2019-11-01

## 2019-10-22 RX ORDER — SULFAMETHOXAZOLE AND TRIMETHOPRIM 800; 160 MG/1; MG/1
1 TABLET ORAL 2 TIMES DAILY
Qty: 20 TABLET | Refills: 0 | Status: SHIPPED | OUTPATIENT
Start: 2019-10-22 | End: 2019-10-22 | Stop reason: SDUPTHER

## 2019-10-22 NOTE — PROGRESS NOTES
Subjective:       Patient ID: Vane Seo is a 54 y.o. female with multiple medical diagnoses as listed in the medical history and problem list that presents for Recurrent Skin Infections (right leg x 8 days)  .    Chief Complaint: Recurrent Skin Infections (right leg x 8 days)      Abscess   Chronicity:  New  Onset:  7 days or greater  Progression Since Onset: unchanged (sunday was when it got to where it is today )  Abscess location: left thigh under her buttock   Associated Symptoms: no fever, no chills  Characteristics: draining, painful, redness and swelling    Pain Scale:  3/10  Ineffective treatments: silver nitrate, warm soak, toradol, tramadol     Review of Systems   Constitutional: Negative for chills and fever.   Skin: Positive for wound.         PAST MEDICAL HISTORY:  Past Medical History:   Diagnosis Date    Cancer     Breast cancer  4 years ago.     Fibromyalgia     Hypertension        SOCIAL HISTORY:  Social History     Socioeconomic History    Marital status:      Spouse name: Not on file    Number of children: Not on file    Years of education: Not on file    Highest education level: Not on file   Occupational History    Not on file   Social Needs    Financial resource strain: Not on file    Food insecurity:     Worry: Not on file     Inability: Not on file    Transportation needs:     Medical: Not on file     Non-medical: Not on file   Tobacco Use    Smoking status: Former Smoker    Smokeless tobacco: Never Used    Tobacco comment: 1993   Substance and Sexual Activity    Alcohol use: No    Drug use: No    Sexual activity: Yes     Partners: Male   Lifestyle    Physical activity:     Days per week: Not on file     Minutes per session: Not on file    Stress: Not on file   Relationships    Social connections:     Talks on phone: Not on file     Gets together: Not on file     Attends Uatsdin service: Not on file     Active member of club or organization: Not on file  "    Attends meetings of clubs or organizations: Not on file     Relationship status: Not on file   Other Topics Concern    Not on file   Social History Narrative    Not on file       ALLERGIES AND MEDICATIONS: updated and reviewed.  Review of patient's allergies indicates:   Allergen Reactions    Erythromycin Other (See Comments)     convulsions    Cetylpyridinium-benzocaine      Current Outpatient Medications   Medication Sig Dispense Refill    ALPRAZolam (XANAX) 2 MG Tab Take 1 tablet (2 mg total) by mouth nightly as needed. 25 tablet 1    busPIRone (BUSPAR) 15 MG tablet Take 1 tablet (15 mg total) by mouth 2 (two) times daily. 180 tablet 1    butalbital-acetaminophen-caffeine -40 mg (FIORICET, ESGIC) -40 mg per tablet Take 1 tablet by mouth 2 (two) times daily as needed for Headaches. 60 tablet 0    fluticasone (FLONASE) 50 mcg/actuation nasal spray 1 spray (50 mcg total) by Each Nare route 2 (two) times daily. 16 g 5    metoprolol tartrate (LOPRESSOR) 25 MG tablet TAKE 1 TABLET BY MOUTH TWICE DAILY (DISCONTINUE  ATENOLOL) 180 tablet 3    pregabalin (LYRICA) 150 MG capsule Take 1 capsule (150 mg total) by mouth 2 (two) times daily. 60 capsule 5    traMADol (ULTRAM) 50 mg tablet TAKE 1 TABLET BY MOUTH EVERY 4 HOURS DURING  THE  DAYS  AS  NEEDED  FOR  PAIN  AND  2  TABLETS  AT  NIGHT  AS  NEEDED  FOR  PAIN 150 tablet 5    ibuprofen (ADVIL,MOTRIN) 600 MG tablet Take 1 tablet (600 mg total) by mouth every 8 (eight) hours as needed for Pain. 40 tablet 0    sulfamethoxazole-trimethoprim 800-160mg (BACTRIM DS) 800-160 mg Tab Take 1 tablet by mouth 2 (two) times daily. for 10 days 20 tablet 0    tamsulosin (FLOMAX) 0.4 mg Cap Take 1 capsule (0.4 mg total) by mouth once daily. 20 capsule 0     No current facility-administered medications for this visit.          Objective:   /86   Pulse 83   Temp 98.3 °F (36.8 °C) (Oral)   Resp 19   Ht 5' 3" (1.6 m)   Wt 83.1 kg (183 lb 3.2 oz)   SpO2 " 98%   BMI 32.45 kg/m²      Physical Exam   Constitutional: She is oriented to person, place, and time. No distress.   HENT:   Head: Normocephalic and atraumatic.   Neurological: She is alert and oriented to person, place, and time.   Skin: Skin is warm. No erythema.                Assessment:       1. Infected insect bite of right leg, initial encounter        Plan:       Infected insect bite of right leg, initial encounter  -     sulfamethoxazole-trimethoprim 800-160mg (BACTRIM DS) 800-160 mg Tab; Take 1 tablet by mouth 2 (two) times daily. for 10 days  Dispense: 20 tablet; Refill: 0  -     ibuprofen (ADVIL,MOTRIN) 600 MG tablet; Take 1 tablet (600 mg total) by mouth every 8 (eight) hours as needed for Pain.  Dispense: 40 tablet; Refill: 0  Warm compresses  Area marked for erythema  Keep covered unless dressing saturated. Clean with soap and water.     Follow up in 48 hours to assess progress. Sooner if worse.           No follow-ups on file.

## 2019-10-24 ENCOUNTER — TELEPHONE (OUTPATIENT)
Dept: FAMILY MEDICINE | Facility: CLINIC | Age: 54
End: 2019-10-24

## 2019-10-24 ENCOUNTER — OFFICE VISIT (OUTPATIENT)
Dept: FAMILY MEDICINE | Facility: CLINIC | Age: 54
End: 2019-10-24
Payer: COMMERCIAL

## 2019-10-24 VITALS
OXYGEN SATURATION: 97 % | HEART RATE: 77 BPM | RESPIRATION RATE: 16 BRPM | TEMPERATURE: 99 F | HEIGHT: 63 IN | BODY MASS INDEX: 32.11 KG/M2 | SYSTOLIC BLOOD PRESSURE: 120 MMHG | DIASTOLIC BLOOD PRESSURE: 82 MMHG | WEIGHT: 181.19 LBS
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
## 2019-10-24 DIAGNOSIS — L08.9 INFECTED INSECT BITE OF RIGHT LEG, INITIAL ENCOUNTER: Primary | ICD-10-CM

## 2019-10-24 DIAGNOSIS — S80.861A INFECTED INSECT BITE OF RIGHT LEG, INITIAL ENCOUNTER: Primary | ICD-10-CM

## 2019-10-24 LAB — BACTERIA SPEC AEROBE CULT: ABNORMAL

## 2019-10-24 PROCEDURE — 3074F PR MOST RECENT SYSTOLIC BLOOD PRESSURE < 130 MM HG: ICD-10-PCS | Mod: CPTII,S$GLB,, | Performed by: PHYSICIAN ASSISTANT

## 2019-10-24 PROCEDURE — 99213 PR OFFICE/OUTPT VISIT, EST, LEVL III, 20-29 MIN: ICD-10-PCS | Mod: S$GLB,,, | Performed by: PHYSICIAN ASSISTANT

## 2019-10-24 PROCEDURE — 3008F PR BODY MASS INDEX (BMI) DOCUMENTED: ICD-10-PCS | Mod: CPTII,S$GLB,, | Performed by: PHYSICIAN ASSISTANT

## 2019-10-24 PROCEDURE — 99213 OFFICE O/P EST LOW 20 MIN: CPT | Mod: S$GLB,,, | Performed by: PHYSICIAN ASSISTANT

## 2019-10-24 PROCEDURE — 99999 PR PBB SHADOW E&M-EST. PATIENT-LVL III: CPT | Mod: PBBFAC,,, | Performed by: PHYSICIAN ASSISTANT

## 2019-10-24 PROCEDURE — 3008F BODY MASS INDEX DOCD: CPT | Mod: CPTII,S$GLB,, | Performed by: PHYSICIAN ASSISTANT

## 2019-10-24 PROCEDURE — 3079F PR MOST RECENT DIASTOLIC BLOOD PRESSURE 80-89 MM HG: ICD-10-PCS | Mod: CPTII,S$GLB,, | Performed by: PHYSICIAN ASSISTANT

## 2019-10-24 PROCEDURE — 3079F DIAST BP 80-89 MM HG: CPT | Mod: CPTII,S$GLB,, | Performed by: PHYSICIAN ASSISTANT

## 2019-10-24 PROCEDURE — 3074F SYST BP LT 130 MM HG: CPT | Mod: CPTII,S$GLB,, | Performed by: PHYSICIAN ASSISTANT

## 2019-10-24 PROCEDURE — 99999 PR PBB SHADOW E&M-EST. PATIENT-LVL III: ICD-10-PCS | Mod: PBBFAC,,, | Performed by: PHYSICIAN ASSISTANT

## 2019-10-24 NOTE — PROGRESS NOTES
Subjective:       Patient ID: Vane Seo is a 54 y.o. female with multiple medical diagnoses as listed in the medical history and problem list that presents for Abscess (right leg)  .    Chief Complaint: Abscess (right leg)      Wound Check   She was originally treated 5 to 10 days ago. Previous treatment included oral antibiotics. Her temperature was unmeasured prior to arrival. There has been bloody discharge from the wound. The redness has improved. The swelling has improved. The pain has improved. She has no difficulty moving the affected extremity or digit.     Review of Systems      PAST MEDICAL HISTORY:  Past Medical History:   Diagnosis Date    Cancer     Breast cancer  4 years ago.     Fibromyalgia     Hypertension        SOCIAL HISTORY:  Social History     Socioeconomic History    Marital status:      Spouse name: Not on file    Number of children: Not on file    Years of education: Not on file    Highest education level: Not on file   Occupational History    Not on file   Social Needs    Financial resource strain: Not on file    Food insecurity:     Worry: Not on file     Inability: Not on file    Transportation needs:     Medical: Not on file     Non-medical: Not on file   Tobacco Use    Smoking status: Former Smoker    Smokeless tobacco: Never Used    Tobacco comment: 1993   Substance and Sexual Activity    Alcohol use: No    Drug use: No    Sexual activity: Yes     Partners: Male   Lifestyle    Physical activity:     Days per week: Not on file     Minutes per session: Not on file    Stress: Not on file   Relationships    Social connections:     Talks on phone: Not on file     Gets together: Not on file     Attends Hoahaoism service: Not on file     Active member of club or organization: Not on file     Attends meetings of clubs or organizations: Not on file     Relationship status: Not on file   Other Topics Concern    Not on file   Social History Narrative    Not  "on file       ALLERGIES AND MEDICATIONS: updated and reviewed.  Review of patient's allergies indicates:   Allergen Reactions    Erythromycin Other (See Comments)     convulsions    Cetylpyridinium-benzocaine      Current Outpatient Medications   Medication Sig Dispense Refill    ALPRAZolam (XANAX) 2 MG Tab Take 1 tablet (2 mg total) by mouth nightly as needed. 25 tablet 1    busPIRone (BUSPAR) 15 MG tablet Take 1 tablet (15 mg total) by mouth 2 (two) times daily. 180 tablet 1    butalbital-acetaminophen-caffeine -40 mg (FIORICET, ESGIC) -40 mg per tablet Take 1 tablet by mouth 2 (two) times daily as needed for Headaches. 60 tablet 0    fluticasone (FLONASE) 50 mcg/actuation nasal spray 1 spray (50 mcg total) by Each Nare route 2 (two) times daily. 16 g 5    ibuprofen (ADVIL,MOTRIN) 600 MG tablet Take 1 tablet (600 mg total) by mouth every 8 (eight) hours as needed for Pain. 40 tablet 0    metoprolol tartrate (LOPRESSOR) 25 MG tablet TAKE 1 TABLET BY MOUTH TWICE DAILY (DISCONTINUE  ATENOLOL) 180 tablet 3    pregabalin (LYRICA) 150 MG capsule Take 1 capsule (150 mg total) by mouth 2 (two) times daily. 60 capsule 5    sulfamethoxazole-trimethoprim 800-160mg (BACTRIM DS) 800-160 mg Tab Take 1 tablet by mouth 2 (two) times daily. for 10 days 20 tablet 0    traMADol (ULTRAM) 50 mg tablet TAKE 1 TABLET BY MOUTH EVERY 4 HOURS DURING  THE  DAYS  AS  NEEDED  FOR  PAIN  AND  2  TABLETS  AT  NIGHT  AS  NEEDED  FOR  PAIN 150 tablet 5    tamsulosin (FLOMAX) 0.4 mg Cap Take 1 capsule (0.4 mg total) by mouth once daily. 20 capsule 0     No current facility-administered medications for this visit.          Objective:   /82   Pulse 77   Temp 98.5 °F (36.9 °C) (Oral)   Resp 16   Ht 5' 3" (1.6 m)   Wt 82.2 kg (181 lb 3.5 oz)   SpO2 97%   BMI 32.10 kg/m²      Physical Exam   Skin:                Assessment:       1. Infected insect bite of right leg, initial encounter        Plan:       Infected " insect bite of right leg, initial encounter    The current medical regimen is effective;  continue present plan and medications.  Including heat and abx  Has follow up on 11/2 with pcp so will check wound then. Sooner if needed  Also use antibacterial soap until this clears         No follow-ups on file.

## 2019-10-24 NOTE — TELEPHONE ENCOUNTER
Pt had follow up question about positive staph.  Wanted to see how contagious it was.  Answered all of pts questions, she states understanding.

## 2019-11-02 LAB
ALBUMIN SERPL-MCNC: 4.5 G/DL (ref 3.6–5.1)
ALBUMIN/GLOB SERPL: 1.6 (CALC) (ref 1–2.5)
ALP SERPL-CCNC: 89 U/L (ref 33–130)
ALT SERPL-CCNC: 19 U/L (ref 6–29)
APPEARANCE UR: ABNORMAL
AST SERPL-CCNC: 18 U/L (ref 10–35)
BASOPHILS # BLD AUTO: 70 CELLS/UL (ref 0–200)
BASOPHILS NFR BLD AUTO: 1.3 %
BILIRUB SERPL-MCNC: 0.4 MG/DL (ref 0.2–1.2)
BILIRUB UR QL STRIP: NEGATIVE
BUN SERPL-MCNC: 16 MG/DL (ref 7–25)
BUN/CREAT SERPL: NORMAL (CALC) (ref 6–22)
CALCIUM SERPL-MCNC: 9.6 MG/DL (ref 8.6–10.4)
CHLORIDE SERPL-SCNC: 99 MMOL/L (ref 98–110)
CHOLEST SERPL-MCNC: 180 MG/DL
CHOLEST/HDLC SERPL: 3.4 (CALC)
CO2 SERPL-SCNC: 28 MMOL/L (ref 20–32)
COLOR UR: YELLOW
CREAT SERPL-MCNC: 1.04 MG/DL (ref 0.5–1.05)
EOSINOPHIL # BLD AUTO: 92 CELLS/UL (ref 15–500)
EOSINOPHIL NFR BLD AUTO: 1.7 %
ERYTHROCYTE [DISTWIDTH] IN BLOOD BY AUTOMATED COUNT: 13.7 % (ref 11–15)
ESTRADIOL SERPL-MCNC: <15 PG/ML
FSH SERPL-ACNC: 94 MIU/ML
GFRSERPLBLD MDRD-ARVRAT: 61 ML/MIN/1.73M2
GLOBULIN SER CALC-MCNC: 2.8 G/DL (CALC) (ref 1.9–3.7)
GLUCOSE SERPL-MCNC: 76 MG/DL (ref 65–99)
GLUCOSE UR QL STRIP: NEGATIVE
HBA1C MFR BLD: 5.3 % OF TOTAL HGB
HCT VFR BLD AUTO: 42.1 % (ref 35–45)
HDLC SERPL-MCNC: 53 MG/DL
HGB BLD-MCNC: 13.9 G/DL (ref 11.7–15.5)
HGB UR QL STRIP: NEGATIVE
KETONES UR QL STRIP: NEGATIVE
LDLC SERPL CALC-MCNC: 106 MG/DL (CALC)
LEUKOCYTE ESTERASE UR QL STRIP: NEGATIVE
LH SERPL-ACNC: 29.1 MIU/ML
LYMPHOCYTES # BLD AUTO: 2452 CELLS/UL (ref 850–3900)
LYMPHOCYTES NFR BLD AUTO: 45.4 %
MCH RBC QN AUTO: 26.4 PG (ref 27–33)
MCHC RBC AUTO-ENTMCNC: 33 G/DL (ref 32–36)
MCV RBC AUTO: 80 FL (ref 80–100)
MONOCYTES # BLD AUTO: 319 CELLS/UL (ref 200–950)
MONOCYTES NFR BLD AUTO: 5.9 %
NEUTROPHILS # BLD AUTO: 2468 CELLS/UL (ref 1500–7800)
NEUTROPHILS NFR BLD AUTO: 45.7 %
NITRITE UR QL STRIP: NEGATIVE
NONHDLC SERPL-MCNC: 127 MG/DL (CALC)
PH UR STRIP: ABNORMAL [PH] (ref 5–8)
PLATELET # BLD AUTO: 262 THOUSAND/UL (ref 140–400)
PMV BLD REES-ECKER: 10 FL (ref 7.5–12.5)
POTASSIUM SERPL-SCNC: 4.6 MMOL/L (ref 3.5–5.3)
PROT SERPL-MCNC: 7.3 G/DL (ref 6.1–8.1)
PROT UR QL STRIP: ABNORMAL
RBC # BLD AUTO: 5.26 MILLION/UL (ref 3.8–5.1)
SODIUM SERPL-SCNC: 136 MMOL/L (ref 135–146)
SP GR UR STRIP: 1.02 (ref 1–1.03)
TRIGL SERPL-MCNC: 117 MG/DL
TSH SERPL-ACNC: 2.73 MIU/L
URATE SERPL-MCNC: 4.6 MG/DL (ref 2.5–7)
WBC # BLD AUTO: 5.4 THOUSAND/UL (ref 3.8–10.8)

## 2019-11-04 ENCOUNTER — OFFICE VISIT (OUTPATIENT)
Dept: FAMILY MEDICINE | Facility: CLINIC | Age: 54
End: 2019-11-04
Payer: COMMERCIAL

## 2019-11-04 VITALS
BODY MASS INDEX: 30.34 KG/M2 | WEIGHT: 177.69 LBS | OXYGEN SATURATION: 97 % | TEMPERATURE: 99 F | SYSTOLIC BLOOD PRESSURE: 120 MMHG | HEART RATE: 70 BPM | HEIGHT: 64 IN | DIASTOLIC BLOOD PRESSURE: 70 MMHG

## 2019-11-04 DIAGNOSIS — L08.9 INFECTED INSECT BITE OF RIGHT LEG, INITIAL ENCOUNTER: Primary | ICD-10-CM
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
## 2019-11-04 DIAGNOSIS — S80.861A INFECTED INSECT BITE OF RIGHT LEG, INITIAL ENCOUNTER: Primary | ICD-10-CM

## 2019-11-04 DIAGNOSIS — Z12.11 COLON CANCER SCREENING: ICD-10-CM

## 2019-11-04 PROCEDURE — 3074F PR MOST RECENT SYSTOLIC BLOOD PRESSURE < 130 MM HG: ICD-10-PCS | Mod: CPTII,S$GLB,, | Performed by: PHYSICIAN ASSISTANT

## 2019-11-04 PROCEDURE — 99999 PR PBB SHADOW E&M-EST. PATIENT-LVL III: CPT | Mod: PBBFAC,,, | Performed by: PHYSICIAN ASSISTANT

## 2019-11-04 PROCEDURE — 3008F BODY MASS INDEX DOCD: CPT | Mod: CPTII,S$GLB,, | Performed by: PHYSICIAN ASSISTANT

## 2019-11-04 PROCEDURE — 3078F PR MOST RECENT DIASTOLIC BLOOD PRESSURE < 80 MM HG: ICD-10-PCS | Mod: CPTII,S$GLB,, | Performed by: PHYSICIAN ASSISTANT

## 2019-11-04 PROCEDURE — 99213 OFFICE O/P EST LOW 20 MIN: CPT | Mod: S$GLB,,, | Performed by: PHYSICIAN ASSISTANT

## 2019-11-04 PROCEDURE — 99999 PR PBB SHADOW E&M-EST. PATIENT-LVL III: ICD-10-PCS | Mod: PBBFAC,,, | Performed by: PHYSICIAN ASSISTANT

## 2019-11-04 PROCEDURE — 99213 PR OFFICE/OUTPT VISIT, EST, LEVL III, 20-29 MIN: ICD-10-PCS | Mod: S$GLB,,, | Performed by: PHYSICIAN ASSISTANT

## 2019-11-04 PROCEDURE — 3074F SYST BP LT 130 MM HG: CPT | Mod: CPTII,S$GLB,, | Performed by: PHYSICIAN ASSISTANT

## 2019-11-04 PROCEDURE — 3078F DIAST BP <80 MM HG: CPT | Mod: CPTII,S$GLB,, | Performed by: PHYSICIAN ASSISTANT

## 2019-11-04 PROCEDURE — 3008F PR BODY MASS INDEX (BMI) DOCUMENTED: ICD-10-PCS | Mod: CPTII,S$GLB,, | Performed by: PHYSICIAN ASSISTANT

## 2019-11-04 NOTE — PROGRESS NOTES
Subjective:       Patient ID: Vane Seo is a 54 y.o. female with multiple medical diagnoses as listed in the medical history and problem list that presents for Follow-up  .    Chief Complaint: Follow-up      Follow-up   Chronicity: abscess/infected bite  The problem has been gradually worsening. Pertinent negatives include no chills or fever. Associated symptoms comments: Stopped draining  No just pink   Mildly tender . Treatments tried: finished abx, no longer doing compresses         Review of Systems   Constitutional: Negative for chills and fever.   Skin: Positive for wound.         PAST MEDICAL HISTORY:  Past Medical History:   Diagnosis Date    Cancer     Breast cancer  4 years ago.     Fibromyalgia     Hypertension        SOCIAL HISTORY:  Social History     Socioeconomic History    Marital status:      Spouse name: Not on file    Number of children: Not on file    Years of education: Not on file    Highest education level: Not on file   Occupational History    Not on file   Social Needs    Financial resource strain: Not on file    Food insecurity:     Worry: Not on file     Inability: Not on file    Transportation needs:     Medical: Not on file     Non-medical: Not on file   Tobacco Use    Smoking status: Former Smoker    Smokeless tobacco: Never Used    Tobacco comment: 1993   Substance and Sexual Activity    Alcohol use: No    Drug use: No    Sexual activity: Yes     Partners: Male   Lifestyle    Physical activity:     Days per week: Not on file     Minutes per session: Not on file    Stress: Not on file   Relationships    Social connections:     Talks on phone: Not on file     Gets together: Not on file     Attends [employer] service: Not on file     Active member of club or organization: Not on file     Attends meetings of clubs or organizations: Not on file     Relationship status: Not on file   Other Topics Concern    Not on file   Social History Narrative    Not  "on file       ALLERGIES AND MEDICATIONS: updated and reviewed.  Review of patient's allergies indicates:   Allergen Reactions    Erythromycin Other (See Comments)     convulsions    Cetylpyridinium-benzocaine      Current Outpatient Medications   Medication Sig Dispense Refill    ALPRAZolam (XANAX) 2 MG Tab Take 1 tablet (2 mg total) by mouth nightly as needed. 25 tablet 1    busPIRone (BUSPAR) 15 MG tablet Take 1 tablet (15 mg total) by mouth 2 (two) times daily. 180 tablet 1    butalbital-acetaminophen-caffeine -40 mg (FIORICET, ESGIC) -40 mg per tablet Take 1 tablet by mouth 2 (two) times daily as needed for Headaches. 60 tablet 0    fluticasone (FLONASE) 50 mcg/actuation nasal spray 1 spray (50 mcg total) by Each Nare route 2 (two) times daily. 16 g 5    metoprolol tartrate (LOPRESSOR) 25 MG tablet TAKE 1 TABLET BY MOUTH TWICE DAILY (DISCONTINUE  ATENOLOL) 180 tablet 3    pregabalin (LYRICA) 150 MG capsule Take 1 capsule (150 mg total) by mouth 2 (two) times daily. 60 capsule 5    traMADol (ULTRAM) 50 mg tablet TAKE 1 TABLET BY MOUTH EVERY 4 HOURS DURING  THE  DAYS  AS  NEEDED  FOR  PAIN  AND  2  TABLETS  AT  NIGHT  AS  NEEDED  FOR  PAIN 150 tablet 5    tamsulosin (FLOMAX) 0.4 mg Cap Take 1 capsule (0.4 mg total) by mouth once daily. (Patient not taking: Reported on 11/4/2019) 20 capsule 0     No current facility-administered medications for this visit.          Objective:   /70   Pulse 70   Temp 98.9 °F (37.2 °C) (Oral)   Ht 5' 4" (1.626 m)   Wt 80.6 kg (177 lb 11.1 oz)   SpO2 97%   BMI 30.50 kg/m²      Physical Exam   Constitutional: She is oriented to person, place, and time. No distress.   HENT:   Head: Normocephalic and atraumatic.   Eyes: Conjunctivae and EOM are normal.   Neurological: She is alert and oriented to person, place, and time.   Skin:   Wound is no longer draining and now dry   Markedly less erythematous  Healing well            Assessment:       1. Infected " insect bite of right leg, initial encounter    2. Colon cancer screening        Plan:       Infected insect bite of right leg, initial encounter  The current medical regimen is effective;  continue present plan and medications.  Follow up if not continuing to heal.     Colon cancer screening  -     Fecal Immunochemical Test (iFOBT); Future; Expected date: 11/04/2019            No follow-ups on file.

## 2019-11-09 ENCOUNTER — LAB VISIT (OUTPATIENT)
Dept: LAB | Facility: HOSPITAL | Age: 54
End: 2019-11-09
Attending: FAMILY MEDICINE
Payer: COMMERCIAL

## 2019-11-09 DIAGNOSIS — Z12.11 COLON CANCER SCREENING: ICD-10-CM

## 2019-11-09 PROCEDURE — 82274 ASSAY TEST FOR BLOOD FECAL: CPT

## 2019-11-19 ENCOUNTER — PATIENT OUTREACH (OUTPATIENT)
Dept: ADMINISTRATIVE | Facility: HOSPITAL | Age: 54
End: 2019-11-19

## 2019-11-21 LAB — HEMOCCULT STL QL IA: NEGATIVE

## 2019-12-04 ENCOUNTER — OFFICE VISIT (OUTPATIENT)
Dept: FAMILY MEDICINE | Facility: CLINIC | Age: 54
End: 2019-12-04
Payer: COMMERCIAL

## 2019-12-04 VITALS
OXYGEN SATURATION: 96 % | DIASTOLIC BLOOD PRESSURE: 80 MMHG | TEMPERATURE: 98 F | BODY MASS INDEX: 30.54 KG/M2 | RESPIRATION RATE: 16 BRPM | WEIGHT: 177.94 LBS | HEART RATE: 73 BPM | SYSTOLIC BLOOD PRESSURE: 110 MMHG

## 2019-12-04 DIAGNOSIS — R10.32 LLQ PAIN: Primary | ICD-10-CM

## 2019-12-04 DIAGNOSIS — N30.01 ACUTE CYSTITIS WITH HEMATURIA: ICD-10-CM

## 2019-12-04 LAB
BACTERIA #/AREA URNS HPF: ABNORMAL /HPF
BILIRUB SERPL-MCNC: ABNORMAL MG/DL
BILIRUB UR QL STRIP: NEGATIVE
BLOOD URINE, POC: ABNORMAL
CLARITY UR: ABNORMAL
COLOR UR: YELLOW
COLOR, POC UA: YELLOW
GLUCOSE UR QL STRIP: ABNORMAL
GLUCOSE UR QL STRIP: NEGATIVE
HGB UR QL STRIP: ABNORMAL
KETONES UR QL STRIP: ABNORMAL
KETONES UR QL STRIP: NEGATIVE
LEUKOCYTE ESTERASE UR QL STRIP: NEGATIVE
LEUKOCYTE ESTERASE URINE, POC: ABNORMAL
MICROSCOPIC COMMENT: ABNORMAL
NITRITE UR QL STRIP: NEGATIVE
NITRITE, POC UA: ABNORMAL
PH UR STRIP: 6 [PH] (ref 5–8)
PH, POC UA: 5
PROT UR QL STRIP: NEGATIVE
PROTEIN, POC: ABNORMAL
RBC #/AREA URNS HPF: 1 /HPF (ref 0–4)
SP GR UR STRIP: 1.01 (ref 1–1.03)
SPECIFIC GRAVITY, POC UA: 1.01
SQUAMOUS #/AREA URNS HPF: 15 /HPF
URN SPEC COLLECT METH UR: ABNORMAL
UROBILINOGEN UR STRIP-ACNC: NEGATIVE EU/DL
UROBILINOGEN, POC UA: ABNORMAL
WBC #/AREA URNS HPF: 1 /HPF (ref 0–5)

## 2019-12-04 PROCEDURE — 81001 POCT URINALYSIS, DIPSTICK OR TABLET REAGENT, AUTOMATED, WITH MICROSCOP: ICD-10-PCS | Mod: S$GLB,,, | Performed by: PHYSICIAN ASSISTANT

## 2019-12-04 PROCEDURE — 87086 URINE CULTURE/COLONY COUNT: CPT

## 2019-12-04 PROCEDURE — 99999 PR PBB SHADOW E&M-EST. PATIENT-LVL III: CPT | Mod: PBBFAC,,, | Performed by: PHYSICIAN ASSISTANT

## 2019-12-04 PROCEDURE — 99214 PR OFFICE/OUTPT VISIT, EST, LEVL IV, 30-39 MIN: ICD-10-PCS | Mod: 25,S$GLB,, | Performed by: PHYSICIAN ASSISTANT

## 2019-12-04 PROCEDURE — 3008F BODY MASS INDEX DOCD: CPT | Mod: CPTII,S$GLB,, | Performed by: PHYSICIAN ASSISTANT

## 2019-12-04 PROCEDURE — 3008F PR BODY MASS INDEX (BMI) DOCUMENTED: ICD-10-PCS | Mod: CPTII,S$GLB,, | Performed by: PHYSICIAN ASSISTANT

## 2019-12-04 PROCEDURE — 81000 URINALYSIS NONAUTO W/SCOPE: CPT

## 2019-12-04 PROCEDURE — 99214 OFFICE O/P EST MOD 30 MIN: CPT | Mod: 25,S$GLB,, | Performed by: PHYSICIAN ASSISTANT

## 2019-12-04 PROCEDURE — 99999 PR PBB SHADOW E&M-EST. PATIENT-LVL III: ICD-10-PCS | Mod: PBBFAC,,, | Performed by: PHYSICIAN ASSISTANT

## 2019-12-04 PROCEDURE — 3079F DIAST BP 80-89 MM HG: CPT | Mod: CPTII,S$GLB,, | Performed by: PHYSICIAN ASSISTANT

## 2019-12-04 PROCEDURE — 81001 URINALYSIS AUTO W/SCOPE: CPT | Mod: S$GLB,,, | Performed by: PHYSICIAN ASSISTANT

## 2019-12-04 PROCEDURE — 3074F PR MOST RECENT SYSTOLIC BLOOD PRESSURE < 130 MM HG: ICD-10-PCS | Mod: CPTII,S$GLB,, | Performed by: PHYSICIAN ASSISTANT

## 2019-12-04 PROCEDURE — 3079F PR MOST RECENT DIASTOLIC BLOOD PRESSURE 80-89 MM HG: ICD-10-PCS | Mod: CPTII,S$GLB,, | Performed by: PHYSICIAN ASSISTANT

## 2019-12-04 PROCEDURE — 3074F SYST BP LT 130 MM HG: CPT | Mod: CPTII,S$GLB,, | Performed by: PHYSICIAN ASSISTANT

## 2019-12-04 RX ORDER — NITROFURANTOIN 25; 75 MG/1; MG/1
100 CAPSULE ORAL 2 TIMES DAILY
Qty: 6 CAPSULE | Refills: 0 | Status: SHIPPED | OUTPATIENT
Start: 2019-12-04 | End: 2019-12-07

## 2019-12-06 ENCOUNTER — TELEPHONE (OUTPATIENT)
Dept: FAMILY MEDICINE | Facility: CLINIC | Age: 54
End: 2019-12-06

## 2019-12-06 LAB — BACTERIA UR CULT: NORMAL

## 2019-12-06 NOTE — TELEPHONE ENCOUNTER
----- Message from Merary Masterson sent at 12/6/2019  1:14 PM CST -----  Contact: pt  Type: Patient Call Back    Who called:pt    What is the request in detail:pt wants to discuss getting another antibiotic. Call pt    Can the clinic reply by [facility]?    Would the patient rather a call back or a response via [facility]? call    Best call back number:[phone]    Additional Information:

## 2020-01-07 ENCOUNTER — LAB VISIT (OUTPATIENT)
Dept: LAB | Facility: HOSPITAL | Age: 55
End: 2020-01-07
Attending: FAMILY MEDICINE
Payer: COMMERCIAL

## 2020-01-07 DIAGNOSIS — F41.9 ANXIETY: ICD-10-CM

## 2020-01-07 DIAGNOSIS — N39.0 URINARY TRACT INFECTION WITHOUT HEMATURIA, SITE UNSPECIFIED: ICD-10-CM

## 2020-01-07 DIAGNOSIS — N39.0 URINARY TRACT INFECTION WITHOUT HEMATURIA, SITE UNSPECIFIED: Primary | ICD-10-CM

## 2020-01-07 PROCEDURE — 87086 URINE CULTURE/COLONY COUNT: CPT

## 2020-01-07 RX ORDER — BUSPIRONE HYDROCHLORIDE 30 MG/1
30 TABLET ORAL 2 TIMES DAILY
Qty: 180 TABLET | Refills: 1 | Status: SHIPPED | OUTPATIENT
Start: 2020-01-07 | End: 2020-08-21 | Stop reason: ALTCHOICE

## 2020-01-09 LAB — BACTERIA UR CULT: NORMAL

## 2020-02-08 ENCOUNTER — HOSPITAL ENCOUNTER (EMERGENCY)
Facility: HOSPITAL | Age: 55
Discharge: HOME | End: 2020-02-08
Attending: EMERGENCY MEDICINE
Payer: COMMERCIAL

## 2020-02-08 VITALS
DIASTOLIC BLOOD PRESSURE: 79 MMHG | RESPIRATION RATE: 20 BRPM | SYSTOLIC BLOOD PRESSURE: 149 MMHG | WEIGHT: 180 LBS | HEART RATE: 106 BPM | OXYGEN SATURATION: 98 % | BODY MASS INDEX: 31.89 KG/M2 | TEMPERATURE: 99 F | HEIGHT: 63 IN

## 2020-02-08 DIAGNOSIS — J11.1 INFLUENZA-LIKE ILLNESS: Primary | ICD-10-CM

## 2020-02-08 LAB
CTP QC/QA: YES
POC MOLECULAR INFLUENZA A AGN: NEGATIVE
POC MOLECULAR INFLUENZA B AGN: NEGATIVE

## 2020-02-08 PROCEDURE — 99283 EMERGENCY DEPT VISIT LOW MDM: CPT | Mod: 25

## 2020-02-08 PROCEDURE — 87502 INFLUENZA DNA AMP PROBE: CPT

## 2020-02-08 RX ORDER — OSELTAMIVIR PHOSPHATE 75 MG/1
75 CAPSULE ORAL 2 TIMES DAILY
Qty: 10 CAPSULE | Refills: 0 | Status: SHIPPED | OUTPATIENT
Start: 2020-02-08 | End: 2020-02-13

## 2020-02-08 NOTE — ED PROVIDER NOTES
Encounter Date: 2020    SCRIBE #1 NOTE: IJuan, manuel scribing for, and in the presence of,  Ron Turner PA-C. I have scribed the following portions of the note - Other sections scribed: HPI/ROS.       History     Chief Complaint   Patient presents with    Cough     x1 day with sore throat, cough, congestion; denies sob, cp     This 54 y.o. female with a medical history of fibromyalgia presents to the ED for an emergent evaluation of viral-like symptoms. Pt reports she began with a scratchy throat yesterday evening followed by a cough around 22:00 and then wheezing around midnight. Pt reports generalized body aches right now. Of note, pt's granddaughter is also in the ED for an evaluation of viral-like symptoms. Pt attempted tx with Tramadol and Dayquil this AM. Pt is currently taking an abx for another medical issue. No alleviating factors. Otherwise, pt denies fever, chills, n/v/d, and any other associated symptoms.     The history is provided by the patient. No  was used.     Review of patient's allergies indicates:   Allergen Reactions    Erythromycin Other (See Comments)     convulsions    Cetylpyridinium-benzocaine      Past Medical History:   Diagnosis Date    Cancer     Breast cancer  4 years ago.     Fibromyalgia     Hypertension      Past Surgical History:   Procedure Laterality Date    BREAST SURGERY      2015  rt. breast removed.       SECTION      2 c sections.       Family History   Problem Relation Age of Onset    Asthma Mother     Arthritis Mother     Heart disease Mother     Hypertension Mother     Kidney disease Mother     Stroke Mother     Cataracts Mother     Heart disease Father     Hypertension Other     Colon cancer Paternal Grandfather     Cataracts Maternal Grandmother      Social History     Tobacco Use    Smoking status: Former Smoker    Smokeless tobacco: Never Used    Tobacco comment: 1993   Substance Use Topics     Alcohol use: No    Drug use: No     Review of Systems   Constitutional: Negative for chills and fever.   HENT: Positive for sore throat (scratchy). Negative for ear pain and trouble swallowing.    Eyes: Negative for discharge.   Respiratory: Positive for cough and wheezing.    Cardiovascular: Negative for chest pain.   Gastrointestinal: Negative for diarrhea, nausea and vomiting.   Genitourinary: Negative for difficulty urinating.   Musculoskeletal: Positive for myalgias (generalized).   All other systems reviewed and are negative.      Physical Exam     Initial Vitals [02/08/20 1626]   BP Pulse Resp Temp SpO2   (!) 149/79 106 20 98.6 °F (37 °C) 98 %      MAP       --         Physical Exam    Vitals reviewed.  Constitutional: She appears well-developed and well-nourished. She is not diaphoretic. No distress.   HENT:   Head: Normocephalic and atraumatic.   Right Ear: External ear normal.   Left Ear: External ear normal.   Nose: Nose normal.   Mouth/Throat: Oropharynx is clear and moist. No oropharyngeal exudate.   Eyes: Conjunctivae are normal. No scleral icterus.   Neck: Normal range of motion. Neck supple. No JVD present.   Cardiovascular: Normal rate, regular rhythm, normal heart sounds and intact distal pulses.   Pulmonary/Chest: Breath sounds normal. No respiratory distress. She has no wheezes. She has no rhonchi. She has no rales. She exhibits no tenderness.   Abdominal: Soft. She exhibits no distension and no mass. There is no tenderness. There is no rebound and no guarding.   Musculoskeletal: Normal range of motion.   Lymphadenopathy:     She has no cervical adenopathy.   Neurological: She is alert and oriented to person, place, and time.   Skin: Skin is warm and dry.         ED Course   Procedures  Labs Reviewed   POCT INFLUENZA A/B MOLECULAR          Imaging Results    None          Medical Decision Making:   ED Management:  54 yr old female presenting with constellation of symptoms likely representing  uncomplicated viral upper respiratory symptoms as characterized by cough, scratchy throat, body aches.  Influenza negative, however, patient's granddaughter with similar symptoms is positive for influenza A.    Also considered but less likely:  PTA/RPA/epiglottitis: vaccinated, no hot potato voice, no uvular deviation, no pain with neck movement  Low suspicion for CNS infection, bacterial sinusitis, or pneumonia given exam and history.  Unlikely Strep or EBV as centor negative and with no pharyngeal exudate, posterior LAD.  Will attempt to alleviate symptoms conservatively; no overt indications at this time for antibiotics. Patient treated with Tamiflu given influenza like symptoms and exposure to influenza. No respiratory distress, otherwise relatively well appearing and nontoxic. Return precautions given, patient understands and agrees with plan. All questions answered.  Instructed to follow up with PCP.              Scribe Attestation:   Scribe #1: I performed the above scribed service and the documentation accurately describes the services I performed. I attest to the accuracy of the note.                          Clinical Impression:       ICD-10-CM ICD-9-CM   1. Influenza-like illness R69 799.89            Scribe Attestation: I, Ron Turner, personally performed the services described in this documentation. All medical record entries made by the scribe were at my direction and in my presence. I have reviewed the chart and agree that the record reflects my personal performance and is accurate and complete.                 Ron Turner, UMA  02/08/20 3144

## 2020-02-08 NOTE — ED TRIAGE NOTES
Pt presents to ER with c/o frequent nonproductive cough since yesterday. Pt reports generalized body aches also. Rates pain 4/10. Tramadol taken PTA.   Past Medical History:   Diagnosis Date    Cancer     Breast cancer  4 years ago.     Fibromyalgia     Hypertension      Past Surgical History:   Procedure Laterality Date    BREAST SURGERY      2015  rt. breast removed.       SECTION      2 c sections.

## 2020-03-17 DIAGNOSIS — M79.7 FIBROMYALGIA: ICD-10-CM

## 2020-03-17 RX ORDER — TRAMADOL HYDROCHLORIDE 50 MG/1
TABLET ORAL
Qty: 150 TABLET | Refills: 2 | Status: SHIPPED | OUTPATIENT
Start: 2020-03-17 | End: 2020-05-21

## 2020-03-17 NOTE — TELEPHONE ENCOUNTER
----- Message from Juliet Jha sent at 3/17/2020  9:35 AM CDT -----  Contact: Self   Refill : TRAMADOL 50 MG      [facility] [address] [place] - [id number] [facility]  [place] [address]  Phone: [phone] Fax: [phone]

## 2020-05-20 DIAGNOSIS — M79.7 FIBROMYALGIA: ICD-10-CM

## 2020-05-21 RX ORDER — PREGABALIN 150 MG/1
CAPSULE ORAL
Qty: 60 CAPSULE | Refills: 3 | Status: SHIPPED | OUTPATIENT
Start: 2020-05-21 | End: 2020-08-21 | Stop reason: SDUPTHER

## 2020-05-21 RX ORDER — TRAMADOL HYDROCHLORIDE 50 MG/1
TABLET ORAL
Qty: 150 TABLET | Refills: 2 | Status: SHIPPED | OUTPATIENT
Start: 2020-05-21 | End: 2020-08-21 | Stop reason: SDUPTHER

## 2020-07-17 ENCOUNTER — TELEPHONE (OUTPATIENT)
Dept: FAMILY MEDICINE | Facility: CLINIC | Age: 55
End: 2020-07-17

## 2020-07-17 NOTE — TELEPHONE ENCOUNTER
----- Message from Eliz Ulrich sent at 7/17/2020  4:39 PM CDT -----  Name of Who is Calling:MAINOR CORRALES [[id number]]      What is the request in detail: Patient would like a call back regarding a sooner appointment first available..... Please contact to further discuss and advise       Can the clinic reply by [facility]: no       What Number to Call Back if not in [facility]: [phone] (home) [phone] (work)

## 2020-08-07 ENCOUNTER — PATIENT OUTREACH (OUTPATIENT)
Dept: ADMINISTRATIVE | Facility: HOSPITAL | Age: 55
End: 2020-08-07

## 2020-08-14 DIAGNOSIS — Z11.59 NEED FOR HEPATITIS C SCREENING TEST: ICD-10-CM

## 2020-08-21 ENCOUNTER — OFFICE VISIT (OUTPATIENT)
Dept: FAMILY MEDICINE | Facility: CLINIC | Age: 55
End: 2020-08-21
Payer: COMMERCIAL

## 2020-08-21 VITALS
HEIGHT: 63 IN | BODY MASS INDEX: 31.64 KG/M2 | TEMPERATURE: 98 F | DIASTOLIC BLOOD PRESSURE: 80 MMHG | HEART RATE: 64 BPM | WEIGHT: 178.56 LBS | SYSTOLIC BLOOD PRESSURE: 132 MMHG | OXYGEN SATURATION: 99 %

## 2020-08-21 DIAGNOSIS — F41.9 ANXIETY: ICD-10-CM

## 2020-08-21 DIAGNOSIS — I10 ESSENTIAL HYPERTENSION: ICD-10-CM

## 2020-08-21 DIAGNOSIS — M79.7 FIBROMYALGIA: ICD-10-CM

## 2020-08-21 DIAGNOSIS — Z00.00 ANNUAL PHYSICAL EXAM: Primary | ICD-10-CM

## 2020-08-21 PROCEDURE — 3079F PR MOST RECENT DIASTOLIC BLOOD PRESSURE 80-89 MM HG: ICD-10-PCS | Mod: CPTII,S$GLB,, | Performed by: FAMILY MEDICINE

## 2020-08-21 PROCEDURE — 3075F PR MOST RECENT SYSTOLIC BLOOD PRESS GE 130-139MM HG: ICD-10-PCS | Mod: CPTII,S$GLB,, | Performed by: FAMILY MEDICINE

## 2020-08-21 PROCEDURE — 3075F SYST BP GE 130 - 139MM HG: CPT | Mod: CPTII,S$GLB,, | Performed by: FAMILY MEDICINE

## 2020-08-21 PROCEDURE — 99999 PR PBB SHADOW E&M-EST. PATIENT-LVL III: ICD-10-PCS | Mod: PBBFAC,,, | Performed by: FAMILY MEDICINE

## 2020-08-21 PROCEDURE — 3079F DIAST BP 80-89 MM HG: CPT | Mod: CPTII,S$GLB,, | Performed by: FAMILY MEDICINE

## 2020-08-21 PROCEDURE — 99396 PR PREVENTIVE VISIT,EST,40-64: ICD-10-PCS | Mod: S$GLB,,, | Performed by: FAMILY MEDICINE

## 2020-08-21 PROCEDURE — 99999 PR PBB SHADOW E&M-EST. PATIENT-LVL III: CPT | Mod: PBBFAC,,, | Performed by: FAMILY MEDICINE

## 2020-08-21 PROCEDURE — 3008F PR BODY MASS INDEX (BMI) DOCUMENTED: ICD-10-PCS | Mod: CPTII,S$GLB,, | Performed by: FAMILY MEDICINE

## 2020-08-21 PROCEDURE — 3008F BODY MASS INDEX DOCD: CPT | Mod: CPTII,S$GLB,, | Performed by: FAMILY MEDICINE

## 2020-08-21 PROCEDURE — 99396 PREV VISIT EST AGE 40-64: CPT | Mod: S$GLB,,, | Performed by: FAMILY MEDICINE

## 2020-08-21 RX ORDER — ALPRAZOLAM 2 MG/1
2 TABLET ORAL NIGHTLY PRN
Qty: 25 TABLET | Refills: 1 | Status: SHIPPED | OUTPATIENT
Start: 2020-08-21 | End: 2021-05-10 | Stop reason: SDUPTHER

## 2020-08-21 RX ORDER — METOPROLOL TARTRATE 25 MG/1
TABLET, FILM COATED ORAL
Qty: 180 TABLET | Refills: 3 | Status: SHIPPED | OUTPATIENT
Start: 2020-08-21 | End: 2021-09-18

## 2020-08-21 RX ORDER — TRAMADOL HYDROCHLORIDE 50 MG/1
TABLET ORAL
Qty: 150 TABLET | Refills: 5 | Status: SHIPPED | OUTPATIENT
Start: 2020-08-21 | End: 2021-03-16

## 2020-08-21 RX ORDER — PREGABALIN 150 MG/1
150 CAPSULE ORAL 2 TIMES DAILY
Qty: 60 CAPSULE | Refills: 5 | Status: SHIPPED | OUTPATIENT
Start: 2020-08-21 | End: 2020-08-21 | Stop reason: ALTCHOICE

## 2020-08-21 NOTE — PROGRESS NOTES
"  Chief Complaint   Patient presents with    Medication Refill     SUBJECTIVE:   55 y.o. female for annual routine checkup.  Current Outpatient Medications   Medication Sig Dispense Refill    metoprolol tartrate (LOPRESSOR) 25 MG tablet TAKE 1 TABLET BY MOUTH TWICE DAILY (DISCONTINUE  ATENOLOL) 180 tablet 3    traMADoL (ULTRAM) 50 mg tablet take 1 TABLET(S) BY MOUTH EVERY 4 hours while awake and 2 TABLET(S) AT BEDTIME 150 tablet 5     No current facility-administered medications for this visit.      Allergies: Erythromycin and Cetylpyridinium-benzocaine   Patient's last menstrual period was 10/11/2017.    ROS:  Feeling well. No dyspnea or chest pain on exertion.  No abdominal pain, change in bowel habits, black or bloody stools.  No urinary tract symptoms. GYN ROS: no vaginal bleeding. No neurological complaints.  Has her hip and right leg pain.      OBJECTIVE:   The patient appears well, alert, oriented x 3, in no distress.  /80   Pulse 64   Temp 98.1 °F (36.7 °C) (Oral)   Ht 5' 3" (1.6 m)   Wt 81 kg (178 lb 9.2 oz)   LMP 10/11/2017   SpO2 99%   BMI 31.63 kg/m²   ENT normal.  Neck supple. No adenopathy or thyromegaly. GERMAN. Lungs are clear, good air entry, no wheezes, rhonchi or rales. S1 and S2 normal, no murmurs, regular rate and rhythm. Abdomen soft without tenderness, guarding, mass or organomegaly. Extremities show no edema, normal peripheral pulses. Neurological is normal, no focal findings.    BREAST EXAM: deferred    PELVIC EXAM: deferred    ASSESSMENT:   1. Annual physical exam    2. Fibromyalgia    3. Essential hypertension          PLAN:   Vane was seen today for medication refill.    Diagnoses and all orders for this visit:    Annual physical exam  -     CBC auto differential; Future  -     Comprehensive metabolic panel; Future  -     TSH; Future  -     T4, free; Future  -     Lipid Panel; Future  -     Vitamin D; Future  -     Urinalysis; Future  -     COVID-19 (SARS CoV-2) IgG " Antibody; Future  -     HIV 1/2 Ag/Ab (4th Gen); Future    Fibromyalgia  -     traMADoL (ULTRAM) 50 mg tablet; take 1 TABLET(S) BY MOUTH EVERY 4 hours while awake and 2 TABLET(S) AT BEDTIME  -     Discontinue: pregabalin (LYRICA) 150 MG capsule; Take 1 capsule (150 mg total) by mouth 2 (two) times daily.    Essential hypertension  -     metoprolol tartrate (LOPRESSOR) 25 MG tablet; TAKE 1 TABLET BY MOUTH TWICE DAILY (DISCONTINUE  ATENOLOL)      Counseled on age appropriate medical preventative services, including age appropriate cancer screenings, over all nutritional health, need for a consistent exercise regimen and an over all push towards maintaining a vigorous and active lifestyle.  Counseled on age appropriate vaccines and discussed upcoming health care needs based on age/gender.  Spent time with patient counseling on need for a good patient/doctor relationship moving forward.  Discussed use of common OTC medications and supplements.  Discussed common dietary aids and use of caffeine and the need for good sleep hygiene and stress management.

## 2020-09-09 ENCOUNTER — HOSPITAL ENCOUNTER (INPATIENT)
Facility: HOSPITAL | Age: 55
LOS: 4 days | Discharge: HOME | DRG: 392 | End: 2020-09-13
Attending: EMERGENCY MEDICINE | Admitting: EMERGENCY MEDICINE
Payer: COMMERCIAL

## 2020-09-09 DIAGNOSIS — N30.01 ACUTE CYSTITIS WITH HEMATURIA: ICD-10-CM

## 2020-09-09 DIAGNOSIS — M79.7 FIBROMYALGIA: ICD-10-CM

## 2020-09-09 DIAGNOSIS — D72.829 LEUKOCYTOSIS, UNSPECIFIED TYPE: ICD-10-CM

## 2020-09-09 DIAGNOSIS — K57.20 DIVERTICULITIS OF LARGE INTESTINE WITH ABSCESS WITHOUT BLEEDING: ICD-10-CM

## 2020-09-09 DIAGNOSIS — K57.80 DIVERTICULITIS OF INTESTINE WITH ABSCESS WITHOUT BLEEDING, UNSPECIFIED PART OF INTESTINAL TRACT: Primary | ICD-10-CM

## 2020-09-09 DIAGNOSIS — N28.1 RENAL CYST: ICD-10-CM

## 2020-09-09 LAB
ALBUMIN SERPL BCP-MCNC: 3.6 G/DL (ref 3.5–5.2)
ALP SERPL-CCNC: 99 U/L (ref 55–135)
ALT SERPL W/O P-5'-P-CCNC: 13 U/L (ref 10–44)
ANION GAP SERPL CALC-SCNC: 9 MMOL/L (ref 8–16)
AST SERPL-CCNC: 11 U/L (ref 10–40)
BACTERIA #/AREA URNS HPF: ABNORMAL /HPF
BASOPHILS # BLD AUTO: 0.04 K/UL (ref 0–0.2)
BASOPHILS NFR BLD: 0.3 % (ref 0–1.9)
BILIRUB SERPL-MCNC: 0.6 MG/DL (ref 0.1–1)
BILIRUB UR QL STRIP: NEGATIVE
BUN SERPL-MCNC: 14 MG/DL (ref 6–20)
CALCIUM SERPL-MCNC: 9.4 MG/DL (ref 8.7–10.5)
CHLORIDE SERPL-SCNC: 98 MMOL/L (ref 95–110)
CLARITY UR: CLEAR
CO2 SERPL-SCNC: 29 MMOL/L (ref 23–29)
COLOR UR: YELLOW
CREAT SERPL-MCNC: 0.9 MG/DL (ref 0.5–1.4)
DIFFERENTIAL METHOD: ABNORMAL
EOSINOPHIL # BLD AUTO: 0.1 K/UL (ref 0–0.5)
EOSINOPHIL NFR BLD: 0.5 % (ref 0–8)
ERYTHROCYTE [DISTWIDTH] IN BLOOD BY AUTOMATED COUNT: 13.4 % (ref 11.5–14.5)
EST. GFR  (AFRICAN AMERICAN): >60 ML/MIN/1.73 M^2
EST. GFR  (NON AFRICAN AMERICAN): >60 ML/MIN/1.73 M^2
GLUCOSE SERPL-MCNC: 112 MG/DL (ref 70–110)
GLUCOSE UR QL STRIP: NEGATIVE
HCT VFR BLD AUTO: 41.3 % (ref 37–48.5)
HGB BLD-MCNC: 13.2 G/DL (ref 12–16)
HGB UR QL STRIP: ABNORMAL
HYALINE CASTS #/AREA URNS LPF: 2 /LPF
IMM GRANULOCYTES # BLD AUTO: 0.07 K/UL (ref 0–0.04)
IMM GRANULOCYTES NFR BLD AUTO: 0.5 % (ref 0–0.5)
KETONES UR QL STRIP: ABNORMAL
LEUKOCYTE ESTERASE UR QL STRIP: ABNORMAL
LIPASE SERPL-CCNC: 9 U/L (ref 4–60)
LYMPHOCYTES # BLD AUTO: 1.7 K/UL (ref 1–4.8)
LYMPHOCYTES NFR BLD: 12.8 % (ref 18–48)
MCH RBC QN AUTO: 27 PG (ref 27–31)
MCHC RBC AUTO-ENTMCNC: 32 G/DL (ref 32–36)
MCV RBC AUTO: 85 FL (ref 82–98)
MICROSCOPIC COMMENT: ABNORMAL
MONOCYTES # BLD AUTO: 0.8 K/UL (ref 0.3–1)
MONOCYTES NFR BLD: 6.2 % (ref 4–15)
NEUTROPHILS # BLD AUTO: 10.4 K/UL (ref 1.8–7.7)
NEUTROPHILS NFR BLD: 79.7 % (ref 38–73)
NITRITE UR QL STRIP: POSITIVE
NRBC BLD-RTO: 0 /100 WBC
PH UR STRIP: 5 [PH] (ref 5–8)
PLATELET # BLD AUTO: 206 K/UL (ref 150–350)
PMV BLD AUTO: 10.8 FL (ref 9.2–12.9)
POTASSIUM SERPL-SCNC: 3.4 MMOL/L (ref 3.5–5.1)
PROT SERPL-MCNC: 7.8 G/DL (ref 6–8.4)
PROT UR QL STRIP: ABNORMAL
RBC # BLD AUTO: 4.89 M/UL (ref 4–5.4)
RBC #/AREA URNS HPF: 2 /HPF (ref 0–4)
SARS-COV-2 RDRP RESP QL NAA+PROBE: NEGATIVE
SODIUM SERPL-SCNC: 136 MMOL/L (ref 136–145)
SP GR UR STRIP: >1.03 (ref 1–1.03)
SQUAMOUS #/AREA URNS HPF: 5 /HPF
URN SPEC COLLECT METH UR: ABNORMAL
UROBILINOGEN UR STRIP-ACNC: NEGATIVE EU/DL
WBC # BLD AUTO: 13.01 K/UL (ref 3.9–12.7)
WBC #/AREA URNS HPF: 6 /HPF (ref 0–5)

## 2020-09-09 PROCEDURE — 81000 URINALYSIS NONAUTO W/SCOPE: CPT

## 2020-09-09 PROCEDURE — 25000003 PHARM REV CODE 250: Performed by: PHYSICIAN ASSISTANT

## 2020-09-09 PROCEDURE — 80053 COMPREHEN METABOLIC PANEL: CPT

## 2020-09-09 PROCEDURE — 21400001 HC TELEMETRY ROOM

## 2020-09-09 PROCEDURE — 63600175 PHARM REV CODE 636 W HCPCS: Performed by: NURSE PRACTITIONER

## 2020-09-09 PROCEDURE — 63600175 PHARM REV CODE 636 W HCPCS: Performed by: STUDENT IN AN ORGANIZED HEALTH CARE EDUCATION/TRAINING PROGRAM

## 2020-09-09 PROCEDURE — 25000003 PHARM REV CODE 250: Performed by: NURSE PRACTITIONER

## 2020-09-09 PROCEDURE — 99285 EMERGENCY DEPT VISIT HI MDM: CPT

## 2020-09-09 PROCEDURE — 25000003 PHARM REV CODE 250: Performed by: STUDENT IN AN ORGANIZED HEALTH CARE EDUCATION/TRAINING PROGRAM

## 2020-09-09 PROCEDURE — S5010 5% DEXTROSE AND 0.45% SALINE: HCPCS | Performed by: STUDENT IN AN ORGANIZED HEALTH CARE EDUCATION/TRAINING PROGRAM

## 2020-09-09 PROCEDURE — S0030 INJECTION, METRONIDAZOLE: HCPCS | Performed by: STUDENT IN AN ORGANIZED HEALTH CARE EDUCATION/TRAINING PROGRAM

## 2020-09-09 PROCEDURE — U0002 COVID-19 LAB TEST NON-CDC: HCPCS

## 2020-09-09 PROCEDURE — 25000003 PHARM REV CODE 250: Performed by: EMERGENCY MEDICINE

## 2020-09-09 PROCEDURE — 85025 COMPLETE CBC W/AUTO DIFF WBC: CPT

## 2020-09-09 PROCEDURE — 83690 ASSAY OF LIPASE: CPT

## 2020-09-09 RX ORDER — MORPHINE SULFATE 10 MG/ML
6 INJECTION INTRAMUSCULAR; INTRAVENOUS; SUBCUTANEOUS
Status: COMPLETED | OUTPATIENT
Start: 2020-09-09 | End: 2020-09-09

## 2020-09-09 RX ORDER — MORPHINE SULFATE 10 MG/ML
6 INJECTION INTRAMUSCULAR; INTRAVENOUS; SUBCUTANEOUS EVERY 4 HOURS PRN
Status: DISCONTINUED | OUTPATIENT
Start: 2020-09-09 | End: 2020-09-09

## 2020-09-09 RX ORDER — ONDANSETRON 2 MG/ML
4 INJECTION INTRAMUSCULAR; INTRAVENOUS EVERY 8 HOURS PRN
Status: DISCONTINUED | OUTPATIENT
Start: 2020-09-09 | End: 2020-09-09

## 2020-09-09 RX ORDER — DEXTROSE MONOHYDRATE AND SODIUM CHLORIDE 5; .45 G/100ML; G/100ML
INJECTION, SOLUTION INTRAVENOUS CONTINUOUS
Status: DISCONTINUED | OUTPATIENT
Start: 2020-09-09 | End: 2020-09-12

## 2020-09-09 RX ORDER — MORPHINE SULFATE 10 MG/ML
2 INJECTION INTRAMUSCULAR; INTRAVENOUS; SUBCUTANEOUS EVERY 4 HOURS PRN
Status: DISCONTINUED | OUTPATIENT
Start: 2020-09-09 | End: 2020-09-09

## 2020-09-09 RX ORDER — HYDROMORPHONE HYDROCHLORIDE 2 MG/ML
0.5 INJECTION, SOLUTION INTRAMUSCULAR; INTRAVENOUS; SUBCUTANEOUS EVERY 6 HOURS PRN
Status: DISCONTINUED | OUTPATIENT
Start: 2020-09-09 | End: 2020-09-09

## 2020-09-09 RX ORDER — CIPROFLOXACIN 2 MG/ML
400 INJECTION, SOLUTION INTRAVENOUS
Status: DISCONTINUED | OUTPATIENT
Start: 2020-09-09 | End: 2020-09-12

## 2020-09-09 RX ORDER — METRONIDAZOLE 500 MG/100ML
500 INJECTION, SOLUTION INTRAVENOUS
Status: DISCONTINUED | OUTPATIENT
Start: 2020-09-09 | End: 2020-09-12

## 2020-09-09 RX ORDER — HYDROMORPHONE HYDROCHLORIDE 2 MG/ML
0.25 INJECTION, SOLUTION INTRAMUSCULAR; INTRAVENOUS; SUBCUTANEOUS EVERY 6 HOURS PRN
Status: DISCONTINUED | OUTPATIENT
Start: 2020-09-09 | End: 2020-09-09

## 2020-09-09 RX ORDER — SODIUM CHLORIDE 9 MG/ML
1000 INJECTION, SOLUTION INTRAVENOUS CONTINUOUS
Status: DISCONTINUED | OUTPATIENT
Start: 2020-09-09 | End: 2020-09-09

## 2020-09-09 RX ORDER — OXYCODONE HYDROCHLORIDE 5 MG/1
5 TABLET ORAL EVERY 6 HOURS PRN
Status: DISCONTINUED | OUTPATIENT
Start: 2020-09-09 | End: 2020-09-13 | Stop reason: HOSPADM

## 2020-09-09 RX ORDER — ACETAMINOPHEN 325 MG/1
650 TABLET ORAL EVERY 6 HOURS
Status: DISCONTINUED | OUTPATIENT
Start: 2020-09-10 | End: 2020-09-13 | Stop reason: HOSPADM

## 2020-09-09 RX ORDER — DIPHENHYDRAMINE HYDROCHLORIDE 50 MG/ML
25 INJECTION INTRAMUSCULAR; INTRAVENOUS
Status: DISCONTINUED | OUTPATIENT
Start: 2020-09-09 | End: 2020-09-09

## 2020-09-09 RX ORDER — METOPROLOL TARTRATE 25 MG/1
25 TABLET, FILM COATED ORAL 2 TIMES DAILY
Status: DISCONTINUED | OUTPATIENT
Start: 2020-09-09 | End: 2020-09-13

## 2020-09-09 RX ORDER — SODIUM CHLORIDE 0.9 % (FLUSH) 0.9 %
10 SYRINGE (ML) INJECTION
Status: DISCONTINUED | OUTPATIENT
Start: 2020-09-09 | End: 2020-09-13 | Stop reason: HOSPADM

## 2020-09-09 RX ORDER — HYDROMORPHONE HYDROCHLORIDE 2 MG/ML
0.5 INJECTION, SOLUTION INTRAMUSCULAR; INTRAVENOUS; SUBCUTANEOUS EVERY 6 HOURS PRN
Status: DISCONTINUED | OUTPATIENT
Start: 2020-09-09 | End: 2020-09-13 | Stop reason: HOSPADM

## 2020-09-09 RX ORDER — ONDANSETRON 2 MG/ML
4 INJECTION INTRAMUSCULAR; INTRAVENOUS EVERY 6 HOURS PRN
Status: DISCONTINUED | OUTPATIENT
Start: 2020-09-09 | End: 2020-09-13 | Stop reason: HOSPADM

## 2020-09-09 RX ORDER — ENOXAPARIN SODIUM 100 MG/ML
40 INJECTION SUBCUTANEOUS EVERY 24 HOURS
Status: DISCONTINUED | OUTPATIENT
Start: 2020-09-09 | End: 2020-09-13 | Stop reason: HOSPADM

## 2020-09-09 RX ADMIN — METRONIDAZOLE 500 MG: 500 INJECTION, SOLUTION INTRAVENOUS at 08:09

## 2020-09-09 RX ADMIN — OXYCODONE HYDROCHLORIDE 5 MG: 5 TABLET ORAL at 11:09

## 2020-09-09 RX ADMIN — HYDROMORPHONE HYDROCHLORIDE 0.5 MG: 2 INJECTION, SOLUTION INTRAMUSCULAR; INTRAVENOUS; SUBCUTANEOUS at 07:09

## 2020-09-09 RX ADMIN — ACETAMINOPHEN 650 MG: 325 TABLET ORAL at 11:09

## 2020-09-09 RX ADMIN — ENOXAPARIN SODIUM 40 MG: 40 INJECTION SUBCUTANEOUS at 08:09

## 2020-09-09 RX ADMIN — MORPHINE SULFATE 6 MG: 10 INJECTION, SOLUTION INTRAMUSCULAR; INTRAVENOUS at 06:09

## 2020-09-09 RX ADMIN — METOPROLOL TARTRATE 25 MG: 25 TABLET, FILM COATED ORAL at 08:09

## 2020-09-09 RX ADMIN — PIPERACILLIN AND TAZOBACTAM 4.5 G: 4; .5 INJECTION, POWDER, LYOPHILIZED, FOR SOLUTION INTRAVENOUS; PARENTERAL at 06:09

## 2020-09-09 RX ADMIN — CIPROFLOXACIN 400 MG: 2 INJECTION, SOLUTION INTRAVENOUS at 11:09

## 2020-09-09 RX ADMIN — DEXTROSE AND SODIUM CHLORIDE: 5; .45 INJECTION, SOLUTION INTRAVENOUS at 08:09

## 2020-09-09 RX ADMIN — SODIUM CHLORIDE 1000 ML: 0.9 INJECTION, SOLUTION INTRAVENOUS at 06:09

## 2020-09-09 NOTE — ED NOTES
Patient had some itching after morphine administration on the dorsal side of her right forearm.  Itching presented prior to administration of antibiotics.  Patient denies SOB, swollen tongue, tightness or itchiness in her throat.  On reassessment of area patient has a raised rash about two inches by one inch.

## 2020-09-09 NOTE — ED TRIAGE NOTES
Patient presents to ED with pain x several days in the lower abdomen.  Says she feels like pain is always there but this morning it was unbearable.

## 2020-09-09 NOTE — FIRST PROVIDER EVALUATION
Emergency Department TeleTriage Encounter Note      CHIEF COMPLAINT    Chief Complaint   Patient presents with    Abdominal Pain     sent from [facility] for eval kidneys and colon       VITAL SIGNS   Initial Vitals [09/09/20 1618]   BP Pulse Resp Temp SpO2   116/64 (!) 116 20 98.6 °F (37 °C) 97 %      MAP       --            ALLERGIES    Review of patient's allergies indicates:   Allergen Reactions    Erythromycin Other (See Comments)     convulsions    Cetylpyridinium-benzocaine        PROVIDER TRIAGE NOTE  55-year-old female presents to the ED for evaluation of lower abdominal pain.  Patient reports she had a CT scan done today which was concerning for kidney cyst and colon issue.  She was sent to the ED for further evaluation and IV fluids.    Initial orders will be placed and care will be transferred to an alternate provider when patient is roomed for a full evaluation. Any additional orders and the final disposition will be determined by that provider.      ORDERS  Labs Reviewed   COMPREHENSIVE METABOLIC PANEL   CBC W/ AUTO DIFFERENTIAL   LIPASE   URINALYSIS, REFLEX TO URINE CULTURE       ED Orders (720h ago, onward)    Start Ordered     Status Ordering Provider    09/09/20 1630 09/09/20 1627  sodium chloride 0.9% bolus 1,000 mL  ED 1 Time      Ordered JOHNYKUTTY, DEJON    09/09/20 1628 09/09/20 1627  Diet NPO  Diet effective now      Ordered JOHNYKUTTY, DEJON    09/09/20 1627 09/09/20 1627  Comprehensive metabolic panel  STAT  Collect    Ordered JOHNYKUTTY, DEJON    09/09/20 1627 09/09/20 1627  CBC auto differential  STAT  Collect    Ordered JOHNYKUTTY, DEJON    09/09/20 1627 09/09/20 1627  Lipase  STAT  Collect    Ordered JOHNYKUTTY, DEJON    09/09/20 1627 09/09/20 1627  Urinalysis, Reflex to Urine Culture Urine, Clean Catch  STAT      Ordered JOHNYKUTTY, DEJON    09/09/20 1627 09/09/20 1627  Insert Saline lock IV  Once      Ordered JOSHYKUTTY DEJON            Virtual Visit Note: The provider triage portion of  this emergency department evaluation and documentation was performed via [employer], a HIPAA-compliant telemedicine application, in concert with a tele-presenter in the room. A face to face patient evaluation with one of my colleagues will occur once the patient is placed in an emergency department room.      DISCLAIMER: This note was prepared with [employer] voice recognition transcription software. Garbled syntax, mangled pronouns, and other bizarre constructions may be attributed to that software system.

## 2020-09-09 NOTE — Clinical Note
Kike Seo accompanied Vane Seo to the emergency department on 9/9/2020. They may return to work on 09/11/2020.      If you have any questions or concerns, please don't hesitate to call.      Jean Esposito, ELISABETHP

## 2020-09-09 NOTE — ED PROVIDER NOTES
Encounter Date: 2020       History     Chief Complaint   Patient presents with    Abdominal Pain     sent from [facility] for eval kidneys and colon     CC:  Abdominal pain    HPI: Vane Seo, a 55 y.o. female presents to the ED with a 3 day history of lower abdominal pain that has been constant and gradually worsening since onset.  She reports no fevers, nausea vomiting, dysuria, or upper abdominal pain.  She presented to [facility] for evaluation in which a CT scan was performed.  CT findings concerning for 2 large right renal cysts, liver cyst and meningioma, and diverticulosis with diverticulitis and perforation.  Also urinary tract infection.  Patient was advised to present to the ED for further evaluation and management.      The history is provided by the patient and the spouse. No  was used.     Review of patient's allergies indicates:   Allergen Reactions    Erythromycin Other (See Comments)     convulsions    Cetylpyridinium-benzocaine     Morphine Rash     Past Medical History:   Diagnosis Date    Cancer     Right Breast cancer      Diverticulitis of intestine     Fibromyalgia     History of nephrolithiasis     History of pilonidal cyst     Hypertension      Past Surgical History:   Procedure Laterality Date    BREAST SURGERY      2015  rt. breast removed.       SECTION      2 c sections.      Heart Cyst removed  2009    MASTECTOMY Right      Family History   Problem Relation Age of Onset    Asthma Mother     Arthritis Mother     Heart disease Mother     Hypertension Mother     Kidney disease Mother     Stroke Mother     Cataracts Mother     Heart disease Father     Hypertension Other     Colon cancer Paternal Grandfather     Cataracts Maternal Grandmother      Social History     Tobacco Use    Smoking status: Former Smoker    Smokeless tobacco: Never Used    Tobacco comment: 1993   Substance Use Topics    Alcohol use:  No    Drug use: No     Review of Systems   Constitutional: Negative for chills and fever.   HENT: Negative for trouble swallowing.    Respiratory: Negative for shortness of breath.    Gastrointestinal: Positive for abdominal pain. Negative for constipation, diarrhea, nausea and vomiting.   Genitourinary: Negative for dysuria.   Musculoskeletal: Negative for back pain and neck pain.   Skin: Negative for rash and wound.   Neurological: Negative for syncope.   Psychiatric/Behavioral: Negative for confusion.       Physical Exam     Initial Vitals [09/09/20 1618]   BP Pulse Resp Temp SpO2   116/64 (!) 116 20 98.6 °F (37 °C) 97 %      MAP       --         Physical Exam    Nursing note and vitals reviewed.  Constitutional: She appears well-developed and well-nourished. She is not diaphoretic. She is cooperative.  Non-toxic appearance. No distress.   HENT:   Head: Normocephalic and atraumatic.   Right Ear: External ear normal.   Left Ear: External ear normal.   Mouth/Throat: No trismus in the jaw.   Eyes: Conjunctivae and EOM are normal.   Neck: Normal range of motion. No tracheal deviation present.   Cardiovascular: Normal rate and regular rhythm.   Pulmonary/Chest: Effort normal and breath sounds normal. No stridor. No tachypnea and no bradypnea. No respiratory distress. She has no wheezes.   Abdominal: Soft. She exhibits distension. She exhibits no mass. There is abdominal tenderness in the right lower quadrant, suprapubic area and left lower quadrant. There is no rigidity, no rebound and no guarding.   Abdomen mildly firm and distended however patient reports this is chronic.  Reports tenderness palpation of the lower abdomen.  No guarding or peritoneal signs.   Musculoskeletal: Normal range of motion.   Neurological: She is alert and oriented to person, place, and time. She has normal strength. No sensory deficit. Coordination normal. GCS score is 15. GCS eye subscore is 4. GCS verbal subscore is 5. GCS motor subscore  is 6.   Skin: Skin is warm, dry and intact. Capillary refill takes less than 2 seconds. No bruising and no rash noted. No cyanosis or erythema. Nails show no clubbing.   Psychiatric: She has a normal mood and affect. Her behavior is normal. Judgment and thought content normal.         ED Course   Procedures  Labs Reviewed   COMPREHENSIVE METABOLIC PANEL - Abnormal; Notable for the following components:       Result Value    Potassium 3.4 (*)     Glucose 112 (*)     All other components within normal limits   CBC W/ AUTO DIFFERENTIAL - Abnormal; Notable for the following components:    WBC 13.01 (*)     Gran # (ANC) 10.4 (*)     Immature Grans (Abs) 0.07 (*)     Gran% 79.7 (*)     Lymph% 12.8 (*)     All other components within normal limits   URINALYSIS, REFLEX TO URINE CULTURE - Abnormal; Notable for the following components:    Specific Gravity, UA >1.030 (*)     Protein, UA 1+ (*)     Ketones, UA 2+ (*)     Occult Blood UA 2+ (*)     Nitrite, UA Positive (*)     Leukocytes, UA Trace (*)     All other components within normal limits    Narrative:     Specimen Source->Urine   URINALYSIS MICROSCOPIC - Abnormal; Notable for the following components:    WBC, UA 6 (*)     Hyaline Casts, UA 2 (*)     All other components within normal limits    Narrative:     Specimen Source->Urine   LIPASE   SARS-COV-2 RNA AMPLIFICATION, QUAL          Imaging Results    None          Medical Decision Making:   History:   Old Medical Records: I decided to obtain old medical records.  Old Records Summarized: records from another hospital.       <> Summary of Records: Patient seen at [facility] prior to arrival.  Paperwork states UTI, elevated white blood cell count, massive right renal cyst with small perforated diverticulitis.  White count 15.1.  Urinalysis with nitrite positive urine.  CT report shows diverticulosis with acute diverticulitis with pericolonic stranding in the extraluminal air.  Suggesting perforation with  "possible abscess.  Large abdominal wall hernia.  Liver with right hepatic hemangioma and left hepatic lobe cyst.  Kidneys with a large 57.7 mm and 74 mm right renal cortical cyst.       APC / Resident Notes:   This is an evaluation of a 55 y.o. female that presents to the Emergency Department for abdominal pain and abnormal CT. Physical Exam shows a non-toxic, afebrile, and well appearing female.  Her abdomen is mildly firm however she reports this is chronic.  Tenderness palpation lower abdomen without peritoneal signs or guarding.  Otherwise overall well appearing. Vital signs are reassuring. If available, previous records reviewed. RESULTS:  CT results reviewed from outside hospital.  Findings discussed with General surgery.  Labs today with a leukocytosis.  Nitrates and leukocyte positive urine.    My overall impression is diverticulitis with perforation likely abscess, UTI. I considered, but at this time, do not suspect bowel obstruction, cholecystitis, pyelonephritis, pancreatitis.    ED course: Pain medication, Abx and IVF's. After review of the patients physical exam, ED testing, and history/symptoms, the patient requires additional care in the hospital overnight. Dr. Ferrari with General Surgery will accept the patient on behalf of her Staff Dr. Santana with recommendations for Zosyn and NPO.  Orders will be completed. The diagnosis, treatment and plan for admission were discussed with the patient and understanding was verbalized. All questions or concerns have been addressed. WILLIAMS Velez, FNP-C     Progress note:  1926p nursing staff reported: "Patient had some itching after morphine administration on the dorsal side of her right forearm.  Itching presented prior to administration of antibiotics.  Patient denies SOB, swollen tongue, tightness or itchiness in her throat.  On reassessment of area patient has a raised rash about two inches by one inch. "  Will discontinue morphine on admit orders and switched " to Dilaudid.  I have asked the patients ED nurse - NIURKA Garcia RN to share that information with the nurses on the floor so they can monitor for similar reactions. WILLIAMS Velez, VIRGIL-C               ED Course as of Sep 09 2151   Wed Sep 09, 2020   1738 Reviewed labs and imaging report from [facility]. Will speak with general surgery regarding the case.     [AF]      ED Course User Index  [AF] VIRGIL Cha            Clinical Impression:       ICD-10-CM ICD-9-CM   1. Diverticulitis of intestine with abscess without bleeding, unspecified part of intestinal tract  K57.80 562.11     569.5   2. Acute cystitis with hematuria  N30.01 595.0   3. Renal cyst  N28.1 753.10   4. Leukocytosis, unspecified type  D72.829 288.60   5. Diverticulitis of large intestine with abscess without bleeding  K57.20 562.11     569.5         Disposition:   Disposition: Admitted  Condition: Stable     ED Disposition Condition    Admit                             VIRGIL Cha  09/09/20 2151

## 2020-09-10 LAB
ANION GAP SERPL CALC-SCNC: 10 MMOL/L (ref 8–16)
BASOPHILS # BLD AUTO: 0.02 K/UL (ref 0–0.2)
BASOPHILS NFR BLD: 0.3 % (ref 0–1.9)
BUN SERPL-MCNC: 8 MG/DL (ref 6–20)
CALCIUM SERPL-MCNC: 9.1 MG/DL (ref 8.7–10.5)
CHLORIDE SERPL-SCNC: 103 MMOL/L (ref 95–110)
CO2 SERPL-SCNC: 27 MMOL/L (ref 23–29)
CREAT SERPL-MCNC: 0.8 MG/DL (ref 0.5–1.4)
DIFFERENTIAL METHOD: ABNORMAL
EOSINOPHIL # BLD AUTO: 0.1 K/UL (ref 0–0.5)
EOSINOPHIL NFR BLD: 1 % (ref 0–8)
ERYTHROCYTE [DISTWIDTH] IN BLOOD BY AUTOMATED COUNT: 13.6 % (ref 11.5–14.5)
EST. GFR  (AFRICAN AMERICAN): >60 ML/MIN/1.73 M^2
EST. GFR  (NON AFRICAN AMERICAN): >60 ML/MIN/1.73 M^2
GLUCOSE SERPL-MCNC: 119 MG/DL (ref 70–110)
HCT VFR BLD AUTO: 31.4 % (ref 37–48.5)
HGB BLD-MCNC: 9.7 G/DL (ref 12–16)
IMM GRANULOCYTES # BLD AUTO: 0.05 K/UL (ref 0–0.04)
IMM GRANULOCYTES NFR BLD AUTO: 0.6 % (ref 0–0.5)
LYMPHOCYTES # BLD AUTO: 0.9 K/UL (ref 1–4.8)
LYMPHOCYTES NFR BLD: 12.1 % (ref 18–48)
MAGNESIUM SERPL-MCNC: 2.2 MG/DL (ref 1.6–2.6)
MCH RBC QN AUTO: 27.1 PG (ref 27–31)
MCHC RBC AUTO-ENTMCNC: 30.9 G/DL (ref 32–36)
MCV RBC AUTO: 88 FL (ref 82–98)
MONOCYTES # BLD AUTO: 0.6 K/UL (ref 0.3–1)
MONOCYTES NFR BLD: 8.1 % (ref 4–15)
NEUTROPHILS # BLD AUTO: 6 K/UL (ref 1.8–7.7)
NEUTROPHILS NFR BLD: 77.9 % (ref 38–73)
NRBC BLD-RTO: 0 /100 WBC
PHOSPHATE SERPL-MCNC: 2.7 MG/DL (ref 2.7–4.5)
PLATELET # BLD AUTO: 161 K/UL (ref 150–350)
PMV BLD AUTO: 11.4 FL (ref 9.2–12.9)
POTASSIUM SERPL-SCNC: 3.5 MMOL/L (ref 3.5–5.1)
RBC # BLD AUTO: 3.58 M/UL (ref 4–5.4)
SODIUM SERPL-SCNC: 140 MMOL/L (ref 136–145)
WBC # BLD AUTO: 7.75 K/UL (ref 3.9–12.7)

## 2020-09-10 PROCEDURE — 94761 N-INVAS EAR/PLS OXIMETRY MLT: CPT

## 2020-09-10 PROCEDURE — 84100 ASSAY OF PHOSPHORUS: CPT

## 2020-09-10 PROCEDURE — 83735 ASSAY OF MAGNESIUM: CPT

## 2020-09-10 PROCEDURE — 36415 COLL VENOUS BLD VENIPUNCTURE: CPT

## 2020-09-10 PROCEDURE — S5010 5% DEXTROSE AND 0.45% SALINE: HCPCS | Performed by: STUDENT IN AN ORGANIZED HEALTH CARE EDUCATION/TRAINING PROGRAM

## 2020-09-10 PROCEDURE — 21400001 HC TELEMETRY ROOM

## 2020-09-10 PROCEDURE — 25000003 PHARM REV CODE 250: Performed by: SURGERY

## 2020-09-10 PROCEDURE — 80048 BASIC METABOLIC PNL TOTAL CA: CPT

## 2020-09-10 PROCEDURE — 25000003 PHARM REV CODE 250: Performed by: STUDENT IN AN ORGANIZED HEALTH CARE EDUCATION/TRAINING PROGRAM

## 2020-09-10 PROCEDURE — S0030 INJECTION, METRONIDAZOLE: HCPCS | Performed by: STUDENT IN AN ORGANIZED HEALTH CARE EDUCATION/TRAINING PROGRAM

## 2020-09-10 PROCEDURE — 63600175 PHARM REV CODE 636 W HCPCS: Performed by: STUDENT IN AN ORGANIZED HEALTH CARE EDUCATION/TRAINING PROGRAM

## 2020-09-10 PROCEDURE — 25000003 PHARM REV CODE 250: Performed by: EMERGENCY MEDICINE

## 2020-09-10 PROCEDURE — 85025 COMPLETE CBC W/AUTO DIFF WBC: CPT

## 2020-09-10 RX ORDER — DOCUSATE SODIUM 100 MG/1
100 CAPSULE, LIQUID FILLED ORAL DAILY
Status: DISCONTINUED | OUTPATIENT
Start: 2020-09-10 | End: 2020-09-13 | Stop reason: HOSPADM

## 2020-09-10 RX ADMIN — ONDANSETRON 4 MG: 2 INJECTION INTRAMUSCULAR; INTRAVENOUS at 06:09

## 2020-09-10 RX ADMIN — DEXTROSE AND SODIUM CHLORIDE: 5; .45 INJECTION, SOLUTION INTRAVENOUS at 10:09

## 2020-09-10 RX ADMIN — ACETAMINOPHEN 650 MG: 325 TABLET ORAL at 05:09

## 2020-09-10 RX ADMIN — CIPROFLOXACIN 400 MG: 2 INJECTION, SOLUTION INTRAVENOUS at 11:09

## 2020-09-10 RX ADMIN — METRONIDAZOLE 500 MG: 500 INJECTION, SOLUTION INTRAVENOUS at 05:09

## 2020-09-10 RX ADMIN — METOPROLOL TARTRATE 25 MG: 25 TABLET, FILM COATED ORAL at 08:09

## 2020-09-10 RX ADMIN — ENOXAPARIN SODIUM 40 MG: 40 INJECTION SUBCUTANEOUS at 05:09

## 2020-09-10 RX ADMIN — OXYCODONE HYDROCHLORIDE 5 MG: 5 TABLET ORAL at 05:09

## 2020-09-10 RX ADMIN — DEXTROSE AND SODIUM CHLORIDE: 5; .45 INJECTION, SOLUTION INTRAVENOUS at 06:09

## 2020-09-10 RX ADMIN — ACETAMINOPHEN 650 MG: 325 TABLET ORAL at 11:09

## 2020-09-10 RX ADMIN — METRONIDAZOLE 500 MG: 500 INJECTION, SOLUTION INTRAVENOUS at 08:09

## 2020-09-10 RX ADMIN — OXYCODONE HYDROCHLORIDE 5 MG: 5 TABLET ORAL at 11:09

## 2020-09-10 RX ADMIN — METOPROLOL TARTRATE 25 MG: 25 TABLET, FILM COATED ORAL at 10:09

## 2020-09-10 RX ADMIN — DOCUSATE SODIUM 100 MG: 100 CAPSULE, LIQUID FILLED ORAL at 12:09

## 2020-09-10 RX ADMIN — METRONIDAZOLE 500 MG: 500 INJECTION, SOLUTION INTRAVENOUS at 12:09

## 2020-09-10 NOTE — PLAN OF CARE
Discharge Planning Assessment completed and home needs addressed with patient at bedside. Patient confirmed information on Face Sheet as correct.     SW Role Explained.  Patient identified by using 3 identifiers: name, date of birth and home address.      Patient's Disposition: Pt lives home w/ spouse and children     Patient confirmed that HELP AT HOME will be from: Spouse and children     Patient reports no HME or HME needs      SW placed name and contact number on Communicate Board. Pt informed to contact SW for all discharge needs/questions.      Patient preferences:   PCP: Dino Mcgraw MD  Pharmacy:   [facility] – [place], [place] - [address] [address]  [address] [address] [facility] [address]  Phone: [phone] Fax: [phone]           09/10/20 8051   Discharge Assessment   Assessment Type Discharge Planning Assessment   Confirmed/corrected address and phone number on facesheet? Yes   Assessment information obtained from? Patient   Expected Length of Stay (days) 3   Communicated expected length of stay with patient/caregiver yes   Prior to hospitilization cognitive status: Alert/Oriented   Prior to hospitalization functional status: Independent   Current cognitive status: Alert/Oriented   Current Functional Status: Independent   Facility Arrived From: ED   Lives With child(sierra), dependent;spouse   Able to Return to Prior Arrangements yes   Is patient able to care for self after discharge? Yes   Who are your caregiver(s) and their phone number(s)? Spouse: Kike Seo [phone]   Patient's perception of discharge disposition home or selfcare   Readmission Within the Last 30 Days no previous admission in last 30 days   Patient currently being followed by outpatient case management? No   Patient currently receives any other outside agency services? No   Equipment Currently Used at Home none   Do you have any problems affording any of your prescribed medications? No   Is the patient taking medications as  prescribed? yes   Does the patient have transportation home? Yes   Transportation Anticipated family or friend will provide   Dialysis Name and Scheduled days N/A   Does the patient receive services at the [facility]madin Clinic? No   Discharge Plan A Home with family   DME Needed Upon Discharge  other (see comments)  (Pending)   Patient/Family in Agreement with Plan yes

## 2020-09-10 NOTE — H&P
[facility]  History & Physical    SUBJECTIVE:     Chief Complaint/Reason for Admission: Abdominal pain    History of Present Illness:  Vane Seo is a 55 y.o. female with a PMHx of HTN, kidney stones, breast cancer (s/p R mastectomy with autologous reconstruction), & anxiety, who was transferred from [facility] for further evaluation and management of her abdominal pain. She describes constant, bilateral, lower abdominal pain onset Monday night. Her pain progressively worsened. She denies associated fever, chills, nausea, vomiting, or dysuria. She has never experienced similar symptoms. Previous abdominal surgeries include  (x2 -  & ). She was evaluated at [facility] today and CT A/P demonstrated acute diverticulitis with small microperforation and pericolonic stranding.     PTA Medications   Medication Sig    ALPRAZolam (XANAX) 2 MG Tab Take 1 tablet (2 mg total) by mouth nightly as needed.    metoprolol tartrate (LOPRESSOR) 25 MG tablet TAKE 1 TABLET BY MOUTH TWICE DAILY (DISCONTINUE  ATENOLOL)    traMADoL (ULTRAM) 50 mg tablet take 1 TABLET(S) BY MOUTH EVERY 4 hours while awake and 2 TABLET(S) AT BEDTIME       Review of patient's allergies indicates:   Allergen Reactions    Erythromycin Other (See Comments)     convulsions    Cetylpyridinium-benzocaine     Morphine        Past Medical History:   Diagnosis Date    Cancer     Right Breast cancer      Diverticulitis of intestine     Fibromyalgia     History of nephrolithiasis     History of pilonidal cyst     Hypertension      Past Surgical History:   Procedure Laterality Date    BREAST SURGERY      2015  rt. breast removed.       SECTION      2 c sections.      Heart Cyst removed  2009    MASTECTOMY Right      Family History   Problem Relation Age of Onset    Asthma Mother     Arthritis Mother     Heart disease Mother     Hypertension Mother      Kidney disease Mother     Stroke Mother     Cataracts Mother     Heart disease Father     Hypertension Other     Colon cancer Paternal Grandfather     Cataracts Maternal Grandmother      Social History     Tobacco Use    Smoking status: Former Smoker    Smokeless tobacco: Never Used    Tobacco comment: 1993   Substance Use Topics    Alcohol use: No    Drug use: No        Review of Systems:  Constitutional: no fever or chills  Eyes: no visual changes  ENT: no nasal congestion or sore throat  Respiratory: no cough or shortness of breath  Cardiovascular: no chest pain or palpitations  Gastrointestinal: positive for abdominal pain  Genitourinary: no hematuria or dysuria  Integument/Breast: no rash or pruritis  Hematologic/Lymphatic: no easy bruising or lymphadenopathy  Musculoskeletal: no arthralgias or myalgias  Neurological: no seizures or tremors  Endocrine: no heat or cold intolerance    OBJECTIVE:     Vital Signs (Most Recent):  Temp: 99 °F (37.2 °C) (09/09/20 1914)  Pulse: 93 (09/09/20 1931)  Resp: 18 (09/09/20 1959)  BP: (!) 103/54 (09/09/20 1931)  SpO2: 97 % (09/09/20 1931)    Physical Exam:  General: Well developed, well nourished. Alert and in NAD.  HEENT: NCAT. PERRL. EOM grossly intact. Moist mucus membranes. No erythema or exudates in the posterior oropharynx. No cervical lymphadenopathy. No thyromegaly.   Cardiovasular: Regular rate  Pulmonary: CTAB without wheezes, rhales, or rhonchi. No accessory muscle use.  Abdominal: Soft. Reducible ventral hernia. TTP in the BL lower abdomen (L > R) with voluntary guarding. No involuntary guarding.  Genitourinary: Deferred.  Musculoskeletal: Full ROM. Normal muscle tone. No BLE edema.  Neurological: A&O x 4. CN II-XII grossly intact.  Skin: Warm, clean, dry, and intact. No rashes.  Psych: Appropriate mood and affect.    Laboratory:  CBC:   Recent Labs   Lab 09/09/20  1719   WBC 13.01*   RBC 4.89   HGB 13.2   HCT 41.3      MCV 85   MCH 27.0   MCHC  32.0     BMP:   Recent Labs   Lab 09/09/20  1719   *      K 3.4*   CL 98   CO2 29   BUN 14   CREATININE 0.9   CALCIUM 9.4       Diagnostic Results:  CT A/P: acute diverticulitis with pericolonic stranding and small perforation. Fluid in vesicouterine space, Ventral hernia, large right renal cyst, liver cyst.    ASSESSMENT/PLAN:   Vane Seo is a 55 y.o. female PMHx of HTN, kidney stones, breast cancer (s/p R mastectomy with autologous reconstruction), & anxiety, who is admitted for acute diverticulitis with small microperforation and trace fluid in vesicouterine space. Exam with BL lower abdominal TTP and voluntary guarding. Tachycardic on arrival, but improved with IVF. Also on home beta blocker. Labs remarkable for leukocytosis (WBC 13.1). Will attempt to manage conservatively with bowel rest and IV antibiotics, but patient is aware of potential need for emergent surgery.    - Admit  - NPO  - MIVF  - Cipro/Flagyl  - Zofran PRN  - Pain: APAP, Oxy, Dilaudid  - Continue home Metoprolol   - SCDs/LVX  - Review CT with radiology to determine if fluid in vesicouterine pouch is suitable for drainage.       Kike Baez MD PGY3  [facility] General Surgery  8:31 PM 9/9/2020

## 2020-09-10 NOTE — PLAN OF CARE
Problem: Adult Inpatient Plan of Care  Goal: Plan of Care Review  Flowsheets (Taken 9/10/2020 1752)  Plan of Care Reviewed With:   patient   sibling     Problem: Adult Inpatient Plan of Care  Goal: Absence of Hospital-Acquired Illness or Injury  Intervention: Identify and Manage Fall Risk  Flowsheets (Taken 9/10/2020 1752)  Safety Promotion/Fall Prevention:   assistive device/personal item within reach   bed alarm refused   side rails raised x 2   medications reviewed   nonskid shoes/socks when out of bed   room near unit station  Intervention: Prevent VTE (venous thromboembolism)  Flowsheets (Taken 9/10/2020 1752)  VTE Prevention/Management: intravenous hydration     Problem: Adult Inpatient Plan of Care  Goal: Absence of Hospital-Acquired Illness or Injury  Intervention: Prevent VTE (venous thromboembolism)  Flowsheets (Taken 9/10/2020 1752)  VTE Prevention/Management: intravenous hydration     Problem: Pain Acute  Goal: Optimal Pain Control  Intervention: Optimize Psychosocial Wellbeing  Flowsheets (Taken 9/10/2020 1752)  Supportive Measures:   active listening utilized   verbalization of feelings encouraged     Problem: Fall Injury Risk  Goal: Absence of Fall and Fall-Related Injury  Intervention: Identify and Manage Contributors to Fall Injury Risk  Flowsheets (Taken 9/10/2020 1752)  Self-Care Promotion:   independence encouraged   BADL personal objects within reach  Medication Review/Management: medications reviewed  Intervention: Promote Injury-Free Environment  Flowsheets (Taken 9/10/2020 1752)  Safety Promotion/Fall Prevention:   assistive device/personal item within reach   bed alarm refused   side rails raised x 2   medications reviewed   nonskid shoes/socks when out of bed   room near unit station     Problem: Fall Injury Risk  Goal: Absence of Fall and Fall-Related Injury  Intervention: Promote Injury-Free Environment  Flowsheets (Taken 9/10/2020 1752)  Safety Promotion/Fall Prevention:   assistive  device/personal item within reach   bed alarm refused   side rails raised x 2   medications reviewed   nonskid shoes/socks when out of bed   room near unit station

## 2020-09-10 NOTE — NURSING
Received report from LISSETT Perrin. Patient lying in bed resting, NAD noted. Safety Precautions maintained, will monitor.

## 2020-09-10 NOTE — PROGRESS NOTES
General Surgery Progress Note:     S: NAEO. Pain somewhat improved this AM. AFVSS    O:   Vitals:    09/10/20 0806 09/10/20 0843 09/10/20 1112 09/10/20 1134   BP: (!) 113/55   133/70   BP Location: Left arm   Left arm   Patient Position: Lying   Lying   Pulse: 74   76   Resp: 17  18 17   Temp: 98.2 °F (36.8 °C)   99.7 °F (37.6 °C)   TempSrc: Oral   Oral   SpO2: (!) 94% (!) 94%  96%   Weight:       Height:           Gen: awake, resting comfortably in bed  HEENT: NCAT, anicteric  Resp: Normal WOB, symmetric expansion  CV: RR, no murmur  Abd: Soft, tender in LLQ/suprapubic region  Ext: WWP, no edema  Skin: warm, dry, intact  Neuro: alert, normal speech  Psych: Normal mood, normal affect      Lab Results   Component Value Date    WBC 7.75 09/10/2020    HGB 9.7 (L) 09/10/2020    HCT 31.4 (L) 09/10/2020    MCV 88 09/10/2020     09/10/2020           CMP  Sodium   Date Value Ref Range Status   09/10/2020 140 136 - 145 mmol/L Final     Potassium   Date Value Ref Range Status   09/10/2020 3.5 3.5 - 5.1 mmol/L Final     Chloride   Date Value Ref Range Status   09/10/2020 103 95 - 110 mmol/L Final     CO2   Date Value Ref Range Status   09/10/2020 27 23 - 29 mmol/L Final     Glucose   Date Value Ref Range Status   09/10/2020 119 (H) 70 - 110 mg/dL Final     BUN, Bld   Date Value Ref Range Status   09/10/2020 8 6 - 20 mg/dL Final     Creatinine   Date Value Ref Range Status   09/10/2020 0.8 0.5 - 1.4 mg/dL Final     Calcium   Date Value Ref Range Status   09/10/2020 9.1 8.7 - 10.5 mg/dL Final     Total Protein   Date Value Ref Range Status   09/09/2020 7.8 6.0 - 8.4 g/dL Final     Albumin   Date Value Ref Range Status   09/09/2020 3.6 3.5 - 5.2 g/dL Final     Total Bilirubin   Date Value Ref Range Status   09/09/2020 0.6 0.1 - 1.0 mg/dL Final     Comment:     For infants and newborns, interpretation of results should be based  on gestational age, weight and in agreement with clinical  observations.  Premature Infant  recommended reference ranges:  Up to 24 hours.............<8.0 mg/dL  Up to 48 hours............<12.0 mg/dL  3-5 days..................<15.0 mg/dL  6-29 days.................<15.0 mg/dL       Alkaline Phosphatase   Date Value Ref Range Status   09/09/2020 99 55 - 135 U/L Final     AST   Date Value Ref Range Status   09/09/2020 11 10 - 40 U/L Final     ALT   Date Value Ref Range Status   09/09/2020 13 10 - 44 U/L Final     Anion Gap   Date Value Ref Range Status   09/10/2020 10 8 - 16 mmol/L Final     eGFR if    Date Value Ref Range Status   09/10/2020 >60 >60 mL/min/1.73 m^2 Final     eGFR if non    Date Value Ref Range Status   09/10/2020 >60 >60 mL/min/1.73 m^2 Final     Comment:     Calculation used to obtain the estimated glomerular filtration  rate (eGFR) is the CKD-EPI equation.          A/P: 55 y.o. female PMHx of HTN, kidney stones, breast cancer (s/p R mastectomy with autologous reconstruction), & anxiety, who is admitted for acute diverticulitis with small microperforation and trace fluid in vesicouterine space. Exam with BL lower abdominal TTP and voluntary guarding. Tachycardic on arrival, but improved with IVF. Also on home beta blocker. Labs remarkable for leukocytosis (WBC 13.1). Will attempt to manage conservatively with bowel rest and IV antibiotics, but patient is aware of potential need for emergent surgery.     - NPO, MIVF  - Cipro/Flagyl  - Zofran PRN  - Pain: APAP, Oxy, Dilaudid  - Continue home Metoprolol   - SCDs/LVX       Adelaide Ferrari MD  [facility] General Surgery, PGY-V

## 2020-09-10 NOTE — NURSING
Received patient from ER to room via wheelchair.Patient accompanied by transport and family. Transferred patient to bed. Evaluated general patient appearance and condition. Admit assessment initiated. Saline lock at left hand and is intact. No apparent distress noted at this time.

## 2020-09-11 LAB
ANION GAP SERPL CALC-SCNC: 7 MMOL/L (ref 8–16)
BASOPHILS # BLD AUTO: 0.04 K/UL (ref 0–0.2)
BASOPHILS NFR BLD: 0.5 % (ref 0–1.9)
BUN SERPL-MCNC: 5 MG/DL (ref 6–20)
CALCIUM SERPL-MCNC: 8.7 MG/DL (ref 8.7–10.5)
CHLORIDE SERPL-SCNC: 107 MMOL/L (ref 95–110)
CO2 SERPL-SCNC: 28 MMOL/L (ref 23–29)
CREAT SERPL-MCNC: 0.8 MG/DL (ref 0.5–1.4)
DIFFERENTIAL METHOD: ABNORMAL
EOSINOPHIL # BLD AUTO: 0.1 K/UL (ref 0–0.5)
EOSINOPHIL NFR BLD: 1.1 % (ref 0–8)
ERYTHROCYTE [DISTWIDTH] IN BLOOD BY AUTOMATED COUNT: 13.2 % (ref 11.5–14.5)
EST. GFR  (AFRICAN AMERICAN): >60 ML/MIN/1.73 M^2
EST. GFR  (NON AFRICAN AMERICAN): >60 ML/MIN/1.73 M^2
GLUCOSE SERPL-MCNC: 123 MG/DL (ref 70–110)
HCT VFR BLD AUTO: 35.5 % (ref 37–48.5)
HGB BLD-MCNC: 11.3 G/DL (ref 12–16)
IMM GRANULOCYTES # BLD AUTO: 0.06 K/UL (ref 0–0.04)
IMM GRANULOCYTES NFR BLD AUTO: 0.7 % (ref 0–0.5)
LYMPHOCYTES # BLD AUTO: 1.3 K/UL (ref 1–4.8)
LYMPHOCYTES NFR BLD: 15.7 % (ref 18–48)
MAGNESIUM SERPL-MCNC: 2.1 MG/DL (ref 1.6–2.6)
MCH RBC QN AUTO: 26.3 PG (ref 27–31)
MCHC RBC AUTO-ENTMCNC: 31.8 G/DL (ref 32–36)
MCV RBC AUTO: 83 FL (ref 82–98)
MONOCYTES # BLD AUTO: 0.7 K/UL (ref 0.3–1)
MONOCYTES NFR BLD: 8.2 % (ref 4–15)
NEUTROPHILS # BLD AUTO: 6.3 K/UL (ref 1.8–7.7)
NEUTROPHILS NFR BLD: 73.8 % (ref 38–73)
NRBC BLD-RTO: 0 /100 WBC
PHOSPHATE SERPL-MCNC: 2.4 MG/DL (ref 2.7–4.5)
PLATELET # BLD AUTO: 212 K/UL (ref 150–350)
PMV BLD AUTO: 10.5 FL (ref 9.2–12.9)
POTASSIUM SERPL-SCNC: 3.5 MMOL/L (ref 3.5–5.1)
RBC # BLD AUTO: 4.29 M/UL (ref 4–5.4)
SODIUM SERPL-SCNC: 142 MMOL/L (ref 136–145)
WBC # BLD AUTO: 8.49 K/UL (ref 3.9–12.7)

## 2020-09-11 PROCEDURE — 21400001 HC TELEMETRY ROOM

## 2020-09-11 PROCEDURE — 25000003 PHARM REV CODE 250: Performed by: STUDENT IN AN ORGANIZED HEALTH CARE EDUCATION/TRAINING PROGRAM

## 2020-09-11 PROCEDURE — 80048 BASIC METABOLIC PNL TOTAL CA: CPT

## 2020-09-11 PROCEDURE — S5010 5% DEXTROSE AND 0.45% SALINE: HCPCS | Performed by: STUDENT IN AN ORGANIZED HEALTH CARE EDUCATION/TRAINING PROGRAM

## 2020-09-11 PROCEDURE — 25000003 PHARM REV CODE 250: Performed by: EMERGENCY MEDICINE

## 2020-09-11 PROCEDURE — S0030 INJECTION, METRONIDAZOLE: HCPCS | Performed by: STUDENT IN AN ORGANIZED HEALTH CARE EDUCATION/TRAINING PROGRAM

## 2020-09-11 PROCEDURE — 63600175 PHARM REV CODE 636 W HCPCS: Performed by: STUDENT IN AN ORGANIZED HEALTH CARE EDUCATION/TRAINING PROGRAM

## 2020-09-11 PROCEDURE — 85025 COMPLETE CBC W/AUTO DIFF WBC: CPT

## 2020-09-11 PROCEDURE — 84100 ASSAY OF PHOSPHORUS: CPT

## 2020-09-11 PROCEDURE — 36415 COLL VENOUS BLD VENIPUNCTURE: CPT

## 2020-09-11 PROCEDURE — 25000003 PHARM REV CODE 250: Performed by: SURGERY

## 2020-09-11 PROCEDURE — 83735 ASSAY OF MAGNESIUM: CPT

## 2020-09-11 RX ORDER — TRAMADOL HYDROCHLORIDE 50 MG/1
50 TABLET ORAL EVERY 6 HOURS
Status: DISCONTINUED | OUTPATIENT
Start: 2020-09-11 | End: 2020-09-11

## 2020-09-11 RX ORDER — ALPRAZOLAM 0.5 MG/1
2 TABLET ORAL NIGHTLY PRN
Status: DISCONTINUED | OUTPATIENT
Start: 2020-09-11 | End: 2020-09-13 | Stop reason: HOSPADM

## 2020-09-11 RX ORDER — TRAMADOL HYDROCHLORIDE 50 MG/1
100 TABLET ORAL 2 TIMES DAILY
Status: DISCONTINUED | OUTPATIENT
Start: 2020-09-11 | End: 2020-09-13 | Stop reason: HOSPADM

## 2020-09-11 RX ORDER — METOPROLOL TARTRATE 25 MG/1
25 TABLET, FILM COATED ORAL 2 TIMES DAILY
Status: DISCONTINUED | OUTPATIENT
Start: 2020-09-11 | End: 2020-09-13 | Stop reason: HOSPADM

## 2020-09-11 RX ADMIN — TRAMADOL HYDROCHLORIDE 100 MG: 50 TABLET, FILM COATED ORAL at 08:09

## 2020-09-11 RX ADMIN — ENOXAPARIN SODIUM 40 MG: 40 INJECTION SUBCUTANEOUS at 06:09

## 2020-09-11 RX ADMIN — CIPROFLOXACIN 400 MG: 2 INJECTION, SOLUTION INTRAVENOUS at 12:09

## 2020-09-11 RX ADMIN — METRONIDAZOLE 500 MG: 500 INJECTION, SOLUTION INTRAVENOUS at 05:09

## 2020-09-11 RX ADMIN — ONDANSETRON 4 MG: 2 INJECTION INTRAMUSCULAR; INTRAVENOUS at 05:09

## 2020-09-11 RX ADMIN — DEXTROSE AND SODIUM CHLORIDE: 5; .45 INJECTION, SOLUTION INTRAVENOUS at 05:09

## 2020-09-11 RX ADMIN — TRAMADOL HYDROCHLORIDE 100 MG: 50 TABLET, FILM COATED ORAL at 11:09

## 2020-09-11 RX ADMIN — ACETAMINOPHEN 325 MG: 325 TABLET ORAL at 05:09

## 2020-09-11 RX ADMIN — CIPROFLOXACIN 400 MG: 2 INJECTION, SOLUTION INTRAVENOUS at 11:09

## 2020-09-11 RX ADMIN — METOPROLOL TARTRATE 25 MG: 25 TABLET, FILM COATED ORAL at 08:09

## 2020-09-11 RX ADMIN — ACETAMINOPHEN 650 MG: 325 TABLET ORAL at 06:09

## 2020-09-11 RX ADMIN — METRONIDAZOLE 500 MG: 500 INJECTION, SOLUTION INTRAVENOUS at 02:09

## 2020-09-11 RX ADMIN — METRONIDAZOLE 500 MG: 500 INJECTION, SOLUTION INTRAVENOUS at 08:09

## 2020-09-11 RX ADMIN — DEXTROSE AND SODIUM CHLORIDE: 5; .45 INJECTION, SOLUTION INTRAVENOUS at 04:09

## 2020-09-11 NOTE — PLAN OF CARE
Problem: Adult Inpatient Plan of Care  Goal: Plan of Care Review  Outcome: Ongoing, Progressing  Goal: Patient-Specific Goal (Individualization)  Outcome: Ongoing, Progressing  Goal: Absence of Hospital-Acquired Illness or Injury  Outcome: Ongoing, Progressing  Goal: Optimal Comfort and Wellbeing  Outcome: Ongoing, Progressing  Goal: Readiness for Transition of Care  Outcome: Ongoing, Progressing  Goal: Rounds/Family Conference  Outcome: Ongoing, Progressing     Problem: Pain Acute  Goal: Optimal Pain Control  Outcome: Ongoing, Progressing     Problem: Fall Injury Risk  Goal: Absence of Fall and Fall-Related Injury  Outcome: Ongoing, Progressing

## 2020-09-11 NOTE — NURSING
Report given to receiving nurse Omid. Pt awake in bed. Walking rounds completed. Pt assessed, NAD noted.     24hr chart check completed.

## 2020-09-11 NOTE — PHYSICIAN QUERY
PT Name: Vane Seo  MR #: [id number]    Consultant Diagnosis Clarification     CDS: Eden WEST RN  Contact information: [email or web address]    This form is a permanent document in the medical record.    Query Date: September 11, 2020      By submitting this query, we are merely seeking further clarification of documentation.  Please utilize your independent clinical judgment when addressing the question(s) below.    The Medical Record reflects the following:    Clinical Information Location in Medical Record    She presented to [facility] for evaluation in which a CT scan was performed.  CT findings concerning for 2 large right renal cysts, liver cyst and meningioma, and diverticulosis with diverticulitis and perforation.  Also urinary tract infection.    Patient seen at [facility] prior to arrival.  Paperwork states UTI, elevated white blood cell count, massive right renal cyst with small perforated diverticulitis.  White count 15.1.  Urinalysis with nitrite positive urine.    My overall impression is diverticulitis with perforation likely abscess, UTI.    Clinical Impression:  1. Diverticulitis of intestine with abscess without bleeding, unspecified part of intestinal tract  K57.80 562.11      569.5  2. Acute cystitis with hematuria  N30.01 595.0    Nitrate, UA= Positive  Occult blood UA= 2+  Leukocytes, UA = Trace  RBC, UA= 2  WBC, UA= 6^  Bacteria= Rare  Hyaline Casts = 2! ED Prov Notes 9/9/2020          ED Prov Notes 9/9/2020        ED Prov Notes 9/9/2020    ED Prov Notes 9/9/2020            Urinalysis 9/9/2020       Please clarify/confirm the Consultants diagnosis of _____Acute cystitis with hematuria ________:     [x  ] Diagnosis ruled in   [  ] Diagnosis ruled in, but it resolved prior to my assessment of the patient   [  ] Diagnosis ruled out   [  ] Other diagnosis (please specify): _____________________________   [  ] Clinically undetermined     Present on  admission (POA) status:   [   ] Yes (Y)                          [  ] Clinically Undetermined (W)  [   ] No (N)                            [   ] Documentation insufficient to determine if condition is POA (U)

## 2020-09-11 NOTE — NURSING
Patient c/o nausea and abdominal pain during this shift. PRN medications given as ordered. Will continue to monitor.

## 2020-09-11 NOTE — PROGRESS NOTES
Surgery Progress Note    Vane Seo is a 55 y.o. year old female in hospital for diverticulitis. Very anxious due to not having her regular tramadol for her 'nerves'. Having BM's.  hungry    ROS:  Negative except above    PE:  Vitals:    09/10/20 2311 09/10/20 2353 09/11/20 0502 09/11/20 0809   BP: (!) 144/67  (!) 140/64 128/65   BP Location: Left arm  Right arm Right arm   Patient Position: Lying  Lying Lying   Pulse: 82  87 88   Resp: 18 18 18 18   Temp: 98.4 °F (36.9 °C)  98.4 °F (36.9 °C) 97.8 °F (36.6 °C)   TempSrc: Oral  Oral Oral   SpO2: 95%  97% 97%   Weight:       Height:         NAD, appears upset  Ambulates well  No belabored breathing  No skin changes  Abd soft nt nd    A/P:  Vane Seo is a 55 y.o. year old female  with diverticulitis    -add her home meds  -clear liquid diet  -continue antibiotics    Ok Santana  General Surgery - [facility]  9/11/2020  9:57 AM

## 2020-09-11 NOTE — NURSING
Patient vomiting vs spitting up fluids clear in nature with c/o nausea. She last received zofran 40 mgs at 5:35am too early for another dosage. She is also requesting tramadol for fibromyalgia. Will notify Dr. Santana

## 2020-09-12 LAB
ANION GAP SERPL CALC-SCNC: 9 MMOL/L (ref 8–16)
BASOPHILS # BLD AUTO: 0.04 K/UL (ref 0–0.2)
BASOPHILS NFR BLD: 0.6 % (ref 0–1.9)
BUN SERPL-MCNC: 4 MG/DL (ref 6–20)
CALCIUM SERPL-MCNC: 8.6 MG/DL (ref 8.7–10.5)
CHLORIDE SERPL-SCNC: 104 MMOL/L (ref 95–110)
CO2 SERPL-SCNC: 29 MMOL/L (ref 23–29)
CREAT SERPL-MCNC: 0.8 MG/DL (ref 0.5–1.4)
DIFFERENTIAL METHOD: ABNORMAL
EOSINOPHIL # BLD AUTO: 0.1 K/UL (ref 0–0.5)
EOSINOPHIL NFR BLD: 2 % (ref 0–8)
ERYTHROCYTE [DISTWIDTH] IN BLOOD BY AUTOMATED COUNT: 13.2 % (ref 11.5–14.5)
EST. GFR  (AFRICAN AMERICAN): >60 ML/MIN/1.73 M^2
EST. GFR  (NON AFRICAN AMERICAN): >60 ML/MIN/1.73 M^2
GLUCOSE SERPL-MCNC: 164 MG/DL (ref 70–110)
HCT VFR BLD AUTO: 34.9 % (ref 37–48.5)
HGB BLD-MCNC: 10.8 G/DL (ref 12–16)
IMM GRANULOCYTES # BLD AUTO: 0.04 K/UL (ref 0–0.04)
IMM GRANULOCYTES NFR BLD AUTO: 0.6 % (ref 0–0.5)
LYMPHOCYTES # BLD AUTO: 1.5 K/UL (ref 1–4.8)
LYMPHOCYTES NFR BLD: 23.2 % (ref 18–48)
MAGNESIUM SERPL-MCNC: 1.8 MG/DL (ref 1.6–2.6)
MCH RBC QN AUTO: 26.5 PG (ref 27–31)
MCHC RBC AUTO-ENTMCNC: 30.9 G/DL (ref 32–36)
MCV RBC AUTO: 86 FL (ref 82–98)
MONOCYTES # BLD AUTO: 0.7 K/UL (ref 0.3–1)
MONOCYTES NFR BLD: 10.8 % (ref 4–15)
NEUTROPHILS # BLD AUTO: 4.1 K/UL (ref 1.8–7.7)
NEUTROPHILS NFR BLD: 62.8 % (ref 38–73)
NRBC BLD-RTO: 0 /100 WBC
PHOSPHATE SERPL-MCNC: 3.5 MG/DL (ref 2.7–4.5)
PLATELET # BLD AUTO: 206 K/UL (ref 150–350)
PMV BLD AUTO: 10.6 FL (ref 9.2–12.9)
POTASSIUM SERPL-SCNC: 3.1 MMOL/L (ref 3.5–5.1)
RBC # BLD AUTO: 4.08 M/UL (ref 4–5.4)
SODIUM SERPL-SCNC: 142 MMOL/L (ref 136–145)
WBC # BLD AUTO: 6.55 K/UL (ref 3.9–12.7)

## 2020-09-12 PROCEDURE — 80048 BASIC METABOLIC PNL TOTAL CA: CPT

## 2020-09-12 PROCEDURE — 25000003 PHARM REV CODE 250: Performed by: STUDENT IN AN ORGANIZED HEALTH CARE EDUCATION/TRAINING PROGRAM

## 2020-09-12 PROCEDURE — 63600175 PHARM REV CODE 636 W HCPCS: Performed by: STUDENT IN AN ORGANIZED HEALTH CARE EDUCATION/TRAINING PROGRAM

## 2020-09-12 PROCEDURE — 84100 ASSAY OF PHOSPHORUS: CPT

## 2020-09-12 PROCEDURE — S0030 INJECTION, METRONIDAZOLE: HCPCS | Performed by: STUDENT IN AN ORGANIZED HEALTH CARE EDUCATION/TRAINING PROGRAM

## 2020-09-12 PROCEDURE — S5010 5% DEXTROSE AND 0.45% SALINE: HCPCS | Performed by: STUDENT IN AN ORGANIZED HEALTH CARE EDUCATION/TRAINING PROGRAM

## 2020-09-12 PROCEDURE — 21400001 HC TELEMETRY ROOM

## 2020-09-12 PROCEDURE — 85025 COMPLETE CBC W/AUTO DIFF WBC: CPT

## 2020-09-12 PROCEDURE — 83735 ASSAY OF MAGNESIUM: CPT

## 2020-09-12 PROCEDURE — 25000003 PHARM REV CODE 250: Performed by: SURGERY

## 2020-09-12 PROCEDURE — 36415 COLL VENOUS BLD VENIPUNCTURE: CPT

## 2020-09-12 RX ORDER — METRONIDAZOLE 500 MG/1
500 TABLET ORAL EVERY 8 HOURS
Status: DISCONTINUED | OUTPATIENT
Start: 2020-09-12 | End: 2020-09-13 | Stop reason: HOSPADM

## 2020-09-12 RX ORDER — LANOLIN ALCOHOL/MO/W.PET/CERES
400 CREAM (GRAM) TOPICAL ONCE
Status: COMPLETED | OUTPATIENT
Start: 2020-09-12 | End: 2020-09-12

## 2020-09-12 RX ORDER — POTASSIUM CHLORIDE 20 MEQ/1
40 TABLET, EXTENDED RELEASE ORAL ONCE
Status: COMPLETED | OUTPATIENT
Start: 2020-09-12 | End: 2020-09-12

## 2020-09-12 RX ORDER — CIPROFLOXACIN 500 MG/1
500 TABLET ORAL EVERY 12 HOURS
Status: DISCONTINUED | OUTPATIENT
Start: 2020-09-12 | End: 2020-09-13 | Stop reason: HOSPADM

## 2020-09-12 RX ADMIN — METRONIDAZOLE 500 MG: 500 TABLET ORAL at 09:09

## 2020-09-12 RX ADMIN — ACETAMINOPHEN 650 MG: 325 TABLET ORAL at 12:09

## 2020-09-12 RX ADMIN — CIPROFLOXACIN 500 MG: 500 TABLET, FILM COATED ORAL at 10:09

## 2020-09-12 RX ADMIN — DOCUSATE SODIUM 100 MG: 100 CAPSULE, LIQUID FILLED ORAL at 08:09

## 2020-09-12 RX ADMIN — POTASSIUM CHLORIDE 40 MEQ: 1500 TABLET, EXTENDED RELEASE ORAL at 10:09

## 2020-09-12 RX ADMIN — DEXTROSE AND SODIUM CHLORIDE: 5; .45 INJECTION, SOLUTION INTRAVENOUS at 02:09

## 2020-09-12 RX ADMIN — ACETAMINOPHEN 650 MG: 325 TABLET ORAL at 06:09

## 2020-09-12 RX ADMIN — METRONIDAZOLE 500 MG: 500 INJECTION, SOLUTION INTRAVENOUS at 05:09

## 2020-09-12 RX ADMIN — CIPROFLOXACIN 500 MG: 500 TABLET, FILM COATED ORAL at 09:09

## 2020-09-12 RX ADMIN — ONDANSETRON 4 MG: 2 INJECTION INTRAMUSCULAR; INTRAVENOUS at 10:09

## 2020-09-12 RX ADMIN — METRONIDAZOLE 500 MG: 500 TABLET ORAL at 03:09

## 2020-09-12 RX ADMIN — Medication 400 MG: at 10:09

## 2020-09-12 RX ADMIN — ACETAMINOPHEN 650 MG: 325 TABLET ORAL at 05:09

## 2020-09-12 RX ADMIN — TRAMADOL HYDROCHLORIDE 100 MG: 50 TABLET, FILM COATED ORAL at 08:09

## 2020-09-12 RX ADMIN — TRAMADOL HYDROCHLORIDE 100 MG: 50 TABLET, FILM COATED ORAL at 09:09

## 2020-09-12 RX ADMIN — ENOXAPARIN SODIUM 40 MG: 40 INJECTION SUBCUTANEOUS at 06:09

## 2020-09-12 RX ADMIN — METOPROLOL TARTRATE 25 MG: 25 TABLET, FILM COATED ORAL at 08:09

## 2020-09-12 RX ADMIN — METOPROLOL TARTRATE 25 MG: 25 TABLET, FILM COATED ORAL at 09:09

## 2020-09-12 NOTE — PROGRESS NOTES
[facility]  General Surgery  Progress Note    Subjective:     Interval History:   NAEO  Afebrile  Abdominal pain improved  Tolerated clears  Passing stool    Post-Op Info:  * No surgery found *          Medications:  Continuous Infusions:  Scheduled Meds:   acetaminophen  650 mg Oral Q6H    ciprofloxacin HCl  500 mg Oral Q12H    docusate sodium  100 mg Oral Daily    enoxaparin  40 mg Subcutaneous Q24H    magnesium oxide  400 mg Oral Once    metoprolol tartrate  25 mg Oral BID    metoprolol tartrate  25 mg Oral BID    metroNIDAZOLE  500 mg Oral Q8H    potassium chloride  40 mEq Oral Once    traMADoL  100 mg Oral BID     PRN Meds:ALPRAZolam, HYDROmorphone, ondansetron, oxyCODONE, sodium chloride 0.9%     Objective:     Vital Signs (Most Recent):  Temp: 98.1 °F (36.7 °C) (09/12/20 0435)  Pulse: 66 (09/12/20 0435)  Resp: 18 (09/12/20 0854)  BP: (!) 141/69 (09/12/20 0435)  SpO2: 96 % (09/12/20 0435) Vital Signs (24h Range):  Temp:  [96.5 °F (35.8 °C)-99.1 °F (37.3 °C)] 98.1 °F (36.7 °C)  Pulse:  [66-72] 66  Resp:  [18-20] 18  SpO2:  [93 %-96 %] 96 %  BP: (130-149)/(60-69) 141/69       Intake/Output Summary (Last 24 hours) at 9/12/2020 1016  Last data filed at 9/11/2020 1800  Gross per 24 hour   Intake 2020 ml   Output --   Net 2020 ml       Physical Exam   General: Alert and in NAD.  HEENT: NCAT  Cardiovasular: Regular rate  Pulmonary: Symmetric  Abdominal: Soft, non-distended, and minimal TTP in lower abdomen. No rebound or guarding  Musculoskeletal: No BLE edema.  Neurological: Alert and appropriate.  Skin: Warm, clean, dry, and intact.      Significant Labs:  BMP:   Recent Labs   Lab 09/12/20 0451   *      K 3.1*      CO2 29   BUN 4*   CREATININE 0.8   CALCIUM 8.6*   MG 1.8     CBC:   Recent Labs   Lab 09/12/20 0451   WBC 6.55   RBC 4.08   HGB 10.8*   HCT 34.9*      MCV 86   MCH 26.5*   MCHC 30.9*       Significant Diagnostics:  None    Assessment/Plan:      Active Diagnoses:    Diagnosis Date Noted POA    Diverticulitis of intestine with abscess without bleeding [K57.80] 09/09/2020 Yes      Problems Resolved During this Admission:     Vane Seo is a 55 y.o. female with a PMHx of HTN, kidney stones, breast cancer (s/p R mastectomy with autologous reconstruction), & anxiety, who was admitted for acute diverticulitis with contained microperforation that has been managed conservatively    Currently afebrile, pain improved, tolerated clears, passing stool    - Advance to mechanical soft diet with low fiber  - Transition to PO Cipro/Flagyl  - Pain: Tylenol, Tramadol, Oxy, Dilaudid  - Continue home metoprolol   - SCDs/LVX for VTE PPx  - Plan for discharge tomorrow provided she continues to improve. She will need outpatient surgery follow up and colonoscopy.     Kike Baez MD  General Surgery  [facility]

## 2020-09-12 NOTE — PLAN OF CARE
Problem: Adult Inpatient Plan of Care  Goal: Plan of Care Review  Outcome: Ongoing, Progressing  Goal: Patient-Specific Goal (Individualization)  Outcome: Ongoing, Progressing  Goal: Absence of Hospital-Acquired Illness or Injury  Outcome: Ongoing, Progressing  Intervention: Prevent VTE (venous thromboembolism)  Flowsheets (Taken 9/12/2020 1757)  VTE Prevention/Management:   ambulation promoted   bleeding precautions maintained   bleeding risk assessed   fluids promoted   intravenous hydration  Goal: Optimal Comfort and Wellbeing  Outcome: Ongoing, Progressing  Goal: Readiness for Transition of Care  Outcome: Ongoing, Progressing  Goal: Rounds/Family Conference  Outcome: Ongoing, Progressing     Problem: Adult Inpatient Plan of Care  Goal: Optimal Comfort and Wellbeing  Outcome: Ongoing, Progressing     Problem: Pain Acute  Goal: Optimal Pain Control  Outcome: Ongoing, Progressing  Intervention: Develop Pain Management Plan  Flowsheets (Taken 9/12/2020 1757)  Pain Management Interventions:   care clustered   diversional activity provided   quiet environment facilitated  Intervention: Prevent or Manage Pain  Flowsheets (Taken 9/12/2020 1757)  Sleep/Rest Enhancement:   awakenings minimized   consistent schedule promoted  Sensory Stimulation Regulation:   care clustered   quiet environment promoted   lighting decreased   music/television provided for stimulation     Problem: Fall Injury Risk  Goal: Absence of Fall and Fall-Related Injury  Outcome: Ongoing, Progressing  Intervention: Promote Injury-Free Environment  Flowsheets (Taken 9/12/2020 1757)  Safety Promotion/Fall Prevention:   assistive device/personal item within reach   diversional activities provided   medications reviewed   lighting adjusted   side rails raised x 2   instructed to call staff for mobility  Environmental Safety Modification:   assistive device/personal items within reach   clutter free environment maintained   lighting adjusted   room near  unit station   room organization consistent

## 2020-09-12 NOTE — NURSING
Report given to Bianca GALEANA and Janes GALEANA. Pt awake and alert. Denies any pain or discomfort at the time. resp even and unlabored.

## 2020-09-13 VITALS
HEIGHT: 63 IN | HEART RATE: 69 BPM | OXYGEN SATURATION: 92 % | SYSTOLIC BLOOD PRESSURE: 156 MMHG | RESPIRATION RATE: 18 BRPM | TEMPERATURE: 98 F | BODY MASS INDEX: 31.33 KG/M2 | DIASTOLIC BLOOD PRESSURE: 69 MMHG | WEIGHT: 176.81 LBS

## 2020-09-13 LAB
ANION GAP SERPL CALC-SCNC: 8 MMOL/L (ref 8–16)
BASOPHILS # BLD AUTO: 0.06 K/UL (ref 0–0.2)
BASOPHILS NFR BLD: 0.9 % (ref 0–1.9)
BUN SERPL-MCNC: 6 MG/DL (ref 6–20)
CALCIUM SERPL-MCNC: 9.1 MG/DL (ref 8.7–10.5)
CHLORIDE SERPL-SCNC: 105 MMOL/L (ref 95–110)
CO2 SERPL-SCNC: 28 MMOL/L (ref 23–29)
CREAT SERPL-MCNC: 0.7 MG/DL (ref 0.5–1.4)
DIFFERENTIAL METHOD: ABNORMAL
EOSINOPHIL # BLD AUTO: 0.1 K/UL (ref 0–0.5)
EOSINOPHIL NFR BLD: 1.4 % (ref 0–8)
ERYTHROCYTE [DISTWIDTH] IN BLOOD BY AUTOMATED COUNT: 13.2 % (ref 11.5–14.5)
EST. GFR  (AFRICAN AMERICAN): >60 ML/MIN/1.73 M^2
EST. GFR  (NON AFRICAN AMERICAN): >60 ML/MIN/1.73 M^2
GLUCOSE SERPL-MCNC: 79 MG/DL (ref 70–110)
HCT VFR BLD AUTO: 35 % (ref 37–48.5)
HGB BLD-MCNC: 10.9 G/DL (ref 12–16)
IMM GRANULOCYTES # BLD AUTO: 0.12 K/UL (ref 0–0.04)
IMM GRANULOCYTES NFR BLD AUTO: 1.8 % (ref 0–0.5)
LYMPHOCYTES # BLD AUTO: 1.6 K/UL (ref 1–4.8)
LYMPHOCYTES NFR BLD: 24.7 % (ref 18–48)
MAGNESIUM SERPL-MCNC: 1.8 MG/DL (ref 1.6–2.6)
MCH RBC QN AUTO: 26.6 PG (ref 27–31)
MCHC RBC AUTO-ENTMCNC: 31.1 G/DL (ref 32–36)
MCV RBC AUTO: 85 FL (ref 82–98)
MONOCYTES # BLD AUTO: 0.6 K/UL (ref 0.3–1)
MONOCYTES NFR BLD: 9.5 % (ref 4–15)
NEUTROPHILS # BLD AUTO: 4.1 K/UL (ref 1.8–7.7)
NEUTROPHILS NFR BLD: 61.7 % (ref 38–73)
NRBC BLD-RTO: 0 /100 WBC
PHOSPHATE SERPL-MCNC: 3.7 MG/DL (ref 2.7–4.5)
PLATELET # BLD AUTO: 223 K/UL (ref 150–350)
PMV BLD AUTO: 10.3 FL (ref 9.2–12.9)
POTASSIUM SERPL-SCNC: 3.6 MMOL/L (ref 3.5–5.1)
RBC # BLD AUTO: 4.1 M/UL (ref 4–5.4)
SODIUM SERPL-SCNC: 141 MMOL/L (ref 136–145)
WBC # BLD AUTO: 6.65 K/UL (ref 3.9–12.7)

## 2020-09-13 PROCEDURE — 83735 ASSAY OF MAGNESIUM: CPT

## 2020-09-13 PROCEDURE — 36415 COLL VENOUS BLD VENIPUNCTURE: CPT

## 2020-09-13 PROCEDURE — 80048 BASIC METABOLIC PNL TOTAL CA: CPT

## 2020-09-13 PROCEDURE — 25000003 PHARM REV CODE 250: Performed by: STUDENT IN AN ORGANIZED HEALTH CARE EDUCATION/TRAINING PROGRAM

## 2020-09-13 PROCEDURE — 25000003 PHARM REV CODE 250: Performed by: SURGERY

## 2020-09-13 PROCEDURE — 84100 ASSAY OF PHOSPHORUS: CPT

## 2020-09-13 PROCEDURE — 85025 COMPLETE CBC W/AUTO DIFF WBC: CPT

## 2020-09-13 RX ORDER — CIPROFLOXACIN 500 MG/1
500 TABLET ORAL EVERY 12 HOURS
Qty: 20 TABLET | Refills: 0 | Status: SHIPPED | OUTPATIENT
Start: 2020-09-13 | End: 2020-09-23

## 2020-09-13 RX ORDER — METRONIDAZOLE 500 MG/1
500 TABLET ORAL EVERY 8 HOURS
Qty: 30 TABLET | Refills: 0 | Status: SHIPPED | OUTPATIENT
Start: 2020-09-13 | End: 2020-09-23

## 2020-09-13 RX ORDER — ONDANSETRON 4 MG/1
4 TABLET, FILM COATED ORAL EVERY 6 HOURS PRN
Qty: 20 TABLET | Refills: 0 | Status: SHIPPED | OUTPATIENT
Start: 2020-09-13

## 2020-09-13 RX ORDER — OXYCODONE AND ACETAMINOPHEN 5; 325 MG/1; MG/1
1 TABLET ORAL EVERY 6 HOURS PRN
Qty: 10 TABLET | Refills: 0 | Status: SHIPPED | OUTPATIENT
Start: 2020-09-13 | End: 2021-05-10 | Stop reason: ALTCHOICE

## 2020-09-13 RX ADMIN — TRAMADOL HYDROCHLORIDE 100 MG: 50 TABLET, FILM COATED ORAL at 08:09

## 2020-09-13 RX ADMIN — CIPROFLOXACIN 500 MG: 500 TABLET, FILM COATED ORAL at 08:09

## 2020-09-13 RX ADMIN — METOPROLOL TARTRATE 25 MG: 25 TABLET, FILM COATED ORAL at 08:09

## 2020-09-13 NOTE — NURSING
Discharge instructions given toat bedside. Patient verbalized understanding and states willingness to comply. Saline lock removed. Tele monitoring removed.

## 2020-09-13 NOTE — PROGRESS NOTES
Patient IV no longer patent & c/o stinging with saline flush, no new IV placed per no scheduled IV medications & pending discharge.

## 2020-09-13 NOTE — PROGRESS NOTES
[facility] CASE MANAGEMENT    WRITTEN DISCHARGE INFORMATION  Appointments and resources to help you manage your care at home based on your preferences:   Follow-up Information     Dino Mcgraw MD. Go on 9/21/2020.    Specialty: Family Medicine  Why: @ 10:15am [facility] Follow-Up with Dr. Mcgraw  Contact information:  [address] [place] [address]  [phone]             Ok Santana MD. Go on 9/25/2020.    Specialties: General Surgery, Surgery  Why: @ 9:30am  Out-Patient Surgery Hospital Follow-Up  Contact information:  [address] [facility] [place] [address]  [phone]                  If an appointment is not scheduled for you when you leave the hospital, call your doctor to schedule a follow up visit within a week.  Healthy Living Instructions to HELP MANAGE YOUR CARE AT HOME:  Things You are responsible for:  1.    Getting your prescriptions filled   2.    Taking your medications as directed, DO NOT MISS ANY DOSES!  3.    Following the diet and exercise recommended by your doctor  4.    Going to your follow-up doctor appointment. This is important because it allows the doctor to monitor your progress and determine if any changes need to be made to your treatment plan.    If you have any questions about MANAGING YOUR CARE AT HOME Call the Nurse Care Line for 24/7 Assistance [phone]       Please answer any calls you may receive from [facility]. We want to continue to support you as you manage your healthcare needs. [facility] is happy to have the opportunity to serve you. Thank you for choosing [facility] for your healthcare needs!      --Your [facility] Case Management Team

## 2020-09-13 NOTE — PLAN OF CARE
09/13/20 0917   Final Note   Assessment Type Final Discharge Note   Anticipated Discharge Disposition Home   What phone number can be called within the next 1-3 days to see how you are doing after discharge?   ([phone])   Hospital Follow Up  Appt(s) scheduled? Yes   Discharge plans and expectations educations in teach back method with documentation complete? Yes   Right Care Referral Info   Post Acute Recommendation No Care   Post-Acute Status   Post-Acute Authorization Other  (Home wt follow-ups)   Other Status No Post-Acute Service Needs   Discharge Delays None known at this time

## 2020-09-13 NOTE — DISCHARGE SUMMARY
[facility]  Discharge Summary     Patient ID:  Vane Seo  [id number]  55 y.o.  1965    Admit date: 9/9/2020    Discharge Date and Time:  09/13/2020 7:15 AM    Admitting Physician: Nic Owusu MD     Discharge Provider: Kike Baez    Reason for Admission: Renal cyst [N28.1]  Acute cystitis with hematuria [N30.01]  Diverticulitis of large intestine with abscess without bleeding [K57.20]  Leukocytosis, unspecified type [D72.829]  Diverticulitis of intestine with abscess without bleeding, unspecified part of intestinal tract [K57.80]    Admission Condition: good    Procedures Performed: * No surgery found *    Hospital Course Vane Seo is a 55 y.o. female with a PMHx of HTN, kidney stones, breast cancer (s/p R mastectomy with autologous reconstruction), & anxiety, who was admitted for acute diverticulitis with contained microperforation. Her CT A/P demonstrated a small microperforation adjacent to the sigmoid and pericolonic stranding. She was admitted and treated conservatively with bowel rest, IVF, and IV antibiotics. She remained hemodynamically normal without peritonitis. Her abdominal pain improved and she was started on CLD on HD2. Her diet was advanced and she was transitioned to PO Cipro/Flagyl. She was deemed appropriate for discharge on 9/13/20. At the time of her discharge, she was tolerating a high fiber diet and her pain was controlled. She was educated on signs/symptoms prompting immediate return for evaluation. She was discharged with a 10 day course of PO Cipro/Flagyl. She will follow up with GI for CS and with Dr. Santana. Patient verbalized understanding and agreement with her treatment plan.      Consults: None    Significant Diagnostic Studies:   CT A/P: acute diverticulitis with pericolonic stranding and small perforation. Fluid in vesicouterine space, Ventral hernia, large right renal cyst, liver cyst.    Final Diagnoses:    Principal  Problem: Diverticulitis of intestine with abscess without bleeding   Secondary Diagnoses:   Active Hospital Problems    Diagnosis  POA    *Diverticulitis of intestine with abscess without bleeding [K57.80]  Yes      Resolved Hospital Problems   No resolved problems to display.       Discharged Condition: good    Discharge Exam:  General: Alert and in NAD.  HEENT: NCAT  Cardiovasular: RRR with no M/R/G.   Pulmonary: CTAB with no wheezes, rhales, or rhonchi. No accessory muscle use.  Abdominal: Soft, non-distended, and non-tender.   Musculoskeletal: No BLE edema.  Neurological: Alert and appropriate.  Skin: Warm, clean, dry, and intact.     Disposition: Home or Self Care    Follow Up/Patient Instructions:     Medications:  Reconciled Home Medications:      Medication List      START taking these medications    ciprofloxacin HCl 500 MG tablet  Commonly known as: CIPRO  Take 1 tablet (500 mg total) by mouth every 12 (twelve) hours. for 10 days     metroNIDAZOLE 500 MG tablet  Commonly known as: FLAGYL  Take 1 tablet (500 mg total) by mouth every 8 (eight) hours. for 10 days     ondansetron 4 MG tablet  Commonly known as: ZOFRAN  Take 1 tablet (4 mg total) by mouth every 6 (six) hours as needed for Nausea.     oxyCODONE-acetaminophen 5-325 mg per tablet  Commonly known as: PERCOCET  Take 1 tablet by mouth every 6 (six) hours as needed for Pain.        CONTINUE taking these medications    ALPRAZolam 2 MG Tab  Commonly known as: XANAX  Take 1 tablet (2 mg total) by mouth nightly as needed.     metoprolol tartrate 25 MG tablet  Commonly known as: LOPRESSOR  TAKE 1 TABLET BY MOUTH TWICE DAILY (DISCONTINUE  ATENOLOL)     traMADoL 50 mg tablet  Commonly known as: ULTRAM  take 1 TABLET(S) BY MOUTH EVERY 4 hours while awake and 2 TABLET(S) AT BEDTIME          Discharge Procedure Orders   Ambulatory referral/consult to Gastroenterology   Standing Status: Future   Referral Priority: Routine Referral Type: Consultation   Referral  Reason: Specialty Services Required   Requested Specialty: Gastroenterology   Number of Visits Requested: 1     Diet Adult Regular     Order Specific Question Answer Comments   Additional restrictions: High Fiber      Activity as tolerated     Follow-up Information     Dino Mcgraw MD In 1 week.    Specialty: Family Medicine  Contact information:  [address] [place] [address]  [phone]             Ok Santana MD In 2 weeks.    Specialties: General Surgery, Surgery  Contact information:  [address] [facility] [place] [id number] [phone]                 Activity: activity as tolerated  Diet: regular diet - high fiber  Wound Care: keep wound clean and dry and none needed    Follow-up with Dr. Santana in 2 weeks.  Refer to GI for colonoscopy    Signed:  Kike Baez  9/13/2020  8:17 AM

## 2020-09-13 NOTE — PROGRESS NOTES
Patient requests antibiotics be scheduled w/ meals to decrease risk of nausea & vomiting r/t last night's scheduled medication administration, MD notified.

## 2020-09-13 NOTE — PLAN OF CARE
Problem: Adult Inpatient Plan of Care  Goal: Plan of Care Review  Outcome: Met  Goal: Patient-Specific Goal (Individualization)  Outcome: Met  Goal: Absence of Hospital-Acquired Illness or Injury  Outcome: Met  Goal: Optimal Comfort and Wellbeing  Outcome: Met  Goal: Readiness for Transition of Care  Outcome: Met  Goal: Rounds/Family Conference  Outcome: Met     Problem: Pain Acute  Goal: Optimal Pain Control  Outcome: Met     Problem: Fall Injury Risk  Goal: Absence of Fall and Fall-Related Injury  Outcome: Met

## 2020-09-13 NOTE — PLAN OF CARE
09/13/20 0916   Post-Acute Status   Post-Acute Authorization Other  (Home with PCP and surgery follow-ups)   Other Status No Post-Acute Service Needs   Discharge Delays None known at this time   Discharge Plan   Discharge Plan A Home with family   Discharge Plan B Home with family

## 2020-09-13 NOTE — NURSING
Handoff received from LISSETT Castillo      Pt resting in bed quietly. NAD noted. No c/o pain.     Fall and safety precautions maintained. Bed alarm activated and audible.. Bed locked in lowest position, with side rails up x2. Call bell and personal items within reach

## 2020-09-13 NOTE — PROGRESS NOTES
"Patient experienced n/v post med administration, schedule tylenol not given. Patient reports feeling better after vomiting stating it was "the antibiotics".   "

## 2020-09-13 NOTE — PROGRESS NOTES
Received report from LISSETT Valenzuela. Patient AAOx4, resting quietly in bed w/ spouse @ bedside, no distress noted. Safety maintained, call light in reach.

## 2020-09-17 ENCOUNTER — PATIENT OUTREACH (OUTPATIENT)
Dept: ADMINISTRATIVE | Facility: CLINIC | Age: 55
End: 2020-09-17

## 2020-09-17 NOTE — PATIENT INSTRUCTIONS
Discharge Instructions for Diverticulitis  You have been diagnosed with diverticulitis. This is a condition in which small pouches form in your colon (large intestine) and become inflamed or infected. Follow the guidelines below for home care.  As you recover  Tips for recovery include:  · Eat a low-fiber diet. Your healthcare provider may advise a liquid diet. This gives your bowel a chance to rest so that it can recover.  · Foods to include: flake cereal, mashed potatoes, pancakes, waffles, pasta, white bread, rice, applesauce, bananas, eggs, fish, poultry, tofu, and well-cooked vegetables  · Take your medicines as directed. Do not stop taking the medicines, even if you feel better.  · Monitor your temperature and report any rising temperature to your healthcare provider.  · Take antibiotics exactly as directed. Do not miss any and keep taking them even if you feel better.   · Drink 6 to 8 glasses of water every day, unless directed otherwise.  · Use a heating pad or hot water bottle to reduce abdominal cramping or pain.  Preventing diverticulitis in the future  Tips for prevention include:  · Eat a high-fiber diet. Fiber adds bulk to the stool so that it passes through the large intestine more easily.  · Keep drinking 6 to 8 glasses of water every day, unless directed otherwise.  · Begin an exercise program. Ask your healthcare provider how to get started. You can benefit from simple activities such as walking or gardening.  · Treat diarrhea with a bland diet. Start with liquids only, then slowly add fiber over time.  · Watch for changes in your bowel movements (constipation to diarrhea).  · Avoid constipation with fiber and add a stool softener if needed.   · Get plenty of rest and sleep.  Follow-up care  Make a follow-up appointment as directed by our staff.  When to call your healthcare provider  Call your healthcare provider immediately if you have any of the following:  · Fever of 100.4°F (38.0°C) or  higher, or as directed by your healthcare provider  · Chills  · Severe cramps in the belly, most commonly the lower left side  · Tenderness in the belly, most commonly the lower left side  · Nausea and vomiting  · Bleeding from your rectum   Date Last Reviewed: 7/1/2016  © 3531-1699 [employer]. [address] [place] [address], [place] [address]. All rights reserved. This information is not intended as a substitute for professional medical care. Always follow your healthcare professional's instructions.

## 2020-09-21 ENCOUNTER — OFFICE VISIT (OUTPATIENT)
Dept: FAMILY MEDICINE | Facility: CLINIC | Age: 55
End: 2020-09-21
Payer: COMMERCIAL

## 2020-09-21 VITALS
HEART RATE: 74 BPM | TEMPERATURE: 97 F | BODY MASS INDEX: 37.66 KG/M2 | SYSTOLIC BLOOD PRESSURE: 120 MMHG | OXYGEN SATURATION: 95 % | WEIGHT: 191.81 LBS | DIASTOLIC BLOOD PRESSURE: 80 MMHG | HEIGHT: 60 IN

## 2020-09-21 DIAGNOSIS — Z09 HOSPITAL DISCHARGE FOLLOW-UP: Primary | ICD-10-CM

## 2020-09-21 DIAGNOSIS — B37.9 YEAST INFECTION: ICD-10-CM

## 2020-09-21 PROCEDURE — 99213 OFFICE O/P EST LOW 20 MIN: CPT | Mod: S$GLB,,, | Performed by: FAMILY MEDICINE

## 2020-09-21 PROCEDURE — 99999 PR PBB SHADOW E&M-EST. PATIENT-LVL III: CPT | Mod: PBBFAC,,, | Performed by: FAMILY MEDICINE

## 2020-09-21 PROCEDURE — 99213 PR OFFICE/OUTPT VISIT, EST, LEVL III, 20-29 MIN: ICD-10-PCS | Mod: S$GLB,,, | Performed by: FAMILY MEDICINE

## 2020-09-21 PROCEDURE — 99999 PR PBB SHADOW E&M-EST. PATIENT-LVL III: ICD-10-PCS | Mod: PBBFAC,,, | Performed by: FAMILY MEDICINE

## 2020-09-21 RX ORDER — FLUCONAZOLE 150 MG/1
150 TABLET ORAL DAILY
Qty: 1 TABLET | Refills: 0 | Status: SHIPPED | OUTPATIENT
Start: 2020-09-21 | End: 2020-09-22

## 2020-09-21 NOTE — PROGRESS NOTES
Chief Complaint   Patient presents with    Transitional Care       SUBJECTIVE:  Vane Seo is a 55 y.o. female here for new problem of diverticulitis and abdomen is better and she has hemorrhoid that is improving and she is doing well currently and she did well and has yeast type infection and irritation and needs diflucan.  Currently has co-morbidities including per problem list..      Past Medical History:   Diagnosis Date    Cancer 2014    Right Breast cancer      Diverticulitis of intestine     Fibromyalgia     History of nephrolithiasis     History of pilonidal cyst     Hypertension      Past Surgical History:   Procedure Laterality Date    BREAST SURGERY      2015  rt. breast removed.       SECTION      2 c sections.      Heart Cyst removed  2009    MASTECTOMY Right      Social History     Socioeconomic History    Marital status:      Spouse name: Not on file    Number of children: Not on file    Years of education: Not on file    Highest education level: Not on file   Occupational History    Not on file   Social Needs    Financial resource strain: Not on file    Food insecurity     Worry: Not on file     Inability: Not on file    Transportation needs     Medical: Not on file     Non-medical: Not on file   Tobacco Use    Smoking status: Former Smoker    Smokeless tobacco: Never Used    Tobacco comment: 1993   Substance and Sexual Activity    Alcohol use: No    Drug use: No    Sexual activity: Yes     Partners: Male   Lifestyle    Physical activity     Days per week: Not on file     Minutes per session: Not on file    Stress: Not on file   Relationships    Social connections     Talks on phone: Not on file     Gets together: Not on file     Attends [employer] service: Not on file     Active member of club or organization: Not on file     Attends meetings of clubs or organizations: Not on file     Relationship status: Not on file   Other Topics Concern     Not on file   Social History Narrative    Not on file     Family History   Problem Relation Age of Onset    Asthma Mother     Arthritis Mother     Heart disease Mother     Hypertension Mother     Kidney disease Mother     Stroke Mother     Cataracts Mother     Heart disease Father     Hypertension Other     Colon cancer Paternal Grandfather     Cataracts Maternal Grandmother      Current Outpatient Medications on File Prior to Visit   Medication Sig Dispense Refill    ALPRAZolam (XANAX) 2 MG Tab Take 1 tablet (2 mg total) by mouth nightly as needed. 25 tablet 1    ciprofloxacin HCl (CIPRO) 500 MG tablet Take 1 tablet (500 mg total) by mouth every 12 (twelve) hours. for 10 days 20 tablet 0    metoprolol tartrate (LOPRESSOR) 25 MG tablet TAKE 1 TABLET BY MOUTH TWICE DAILY (DISCONTINUE  ATENOLOL) 180 tablet 3    metroNIDAZOLE (FLAGYL) 500 MG tablet Take 1 tablet (500 mg total) by mouth every 8 (eight) hours. for 10 days 30 tablet 0    ondansetron (ZOFRAN) 4 MG tablet Take 1 tablet (4 mg total) by mouth every 6 (six) hours as needed for Nausea. 20 tablet 0    oxyCODONE-acetaminophen (PERCOCET) 5-325 mg per tablet Take 1 tablet by mouth every 6 (six) hours as needed for Pain. 10 tablet 0    traMADoL (ULTRAM) 50 mg tablet take 1 TABLET(S) BY MOUTH EVERY 4 hours while awake and 2 TABLET(S) AT BEDTIME 150 tablet 5     No current facility-administered medications on file prior to visit.      Review of patient's allergies indicates:   Allergen Reactions    Erythromycin Other (See Comments)     convulsions    Cetylpyridinium-benzocaine     Morphine Rash         Review of Systems   Constitutional: Negative.    HENT: Negative.    Eyes: Negative.    Respiratory: Negative.    Cardiovascular: Negative.    Gastrointestinal: Positive for abdominal pain.   Genitourinary: Negative.    Musculoskeletal: Negative.    Skin: Negative.    Neurological: Negative.    Endo/Heme/Allergies: Negative.     Psychiatric/Behavioral: Negative.        OBJECTIVE:  /80   Pulse 74   Temp 97.3 °F (36.3 °C) (Oral)   Ht 5' (1.524 m)   Wt 87 kg (191 lb 12.8 oz)   LMP 10/11/2017   SpO2 95%   BMI 37.46 kg/m²     Wt Readings from Last 3 Encounters:   09/21/20 87 kg (191 lb 12.8 oz)   09/09/20 80.2 kg (176 lb 12.9 oz)   08/21/20 81 kg (178 lb 9.2 oz)     BP Readings from Last 3 Encounters:   09/21/20 120/80   09/13/20 (!) 156/69   08/21/20 132/80       She appears well, in no apparent distress.  Alert and oriented times three, pleasant and cooperative. Vital signs are as documented in vital signs section.  The abdomen is soft without tenderness, guarding, mass, rebound or organomegaly. Bowel sounds are normal. No CVA tenderness or inguinal adenopathy noted.      Review of old Records:  Reviewed per epic     Review of old labs:  Lab Results   Component Value Date    TSH 2.73 11/01/2019     Lab Results   Component Value Date    WBC 6.65 09/13/2020    HGB 10.9 (L) 09/13/2020    HCT 35.0 (L) 09/13/2020    MCV 85 09/13/2020     09/13/2020       Chemistry        Component Value Date/Time     09/13/2020 0502    K 3.6 09/13/2020 0502     09/13/2020 0502    CO2 28 09/13/2020 0502    BUN 6 09/13/2020 0502    CREATININE 0.7 09/13/2020 0502    GLU 79 09/13/2020 0502        Component Value Date/Time    CALCIUM 9.1 09/13/2020 0502    ALKPHOS 99 09/09/2020 1719    AST 11 09/09/2020 1719    ALT 13 09/09/2020 1719    BILITOT 0.6 09/09/2020 1719    ESTGFRAFRICA >60 09/13/2020 0502    EGFRNONAA >60 09/13/2020 0502        Lab Results   Component Value Date    CHOL 180 11/01/2019    CHOL 204 (H) 09/14/2018    CHOL 180 09/04/2015     Lab Results   Component Value Date    HDL 53 11/01/2019    HDL 57 09/14/2018    HDL 51 09/04/2015     Lab Results   Component Value Date    LDLCALC 106 (H) 11/01/2019    LDLCALC 124 (H) 09/14/2018    LDLCALC 100.2 09/04/2015     Lab Results   Component Value Date    TRIG 117 11/01/2019     TRIG 119 09/14/2018    TRIG 144 09/04/2015     Lab Results   Component Value Date    CHOLHDL 3.4 11/01/2019    CHOLHDL 3.6 09/14/2018    CHOLHDL 28.3 09/04/2015         Review of old imaging:      ASSESSMENT:  Problem List Items Addressed This Visit     None      Visit Diagnoses     Hospital discharge follow-up    -  Primary    Yeast infection        Relevant Medications    fluconazole (DIFLUCAN) 150 MG Tab          ICD-10-CM ICD-9-CM   1. Hospital discharge follow-up  Z09 V67.59   2. Yeast infection  B37.9 112.9         PLAN:  Problem List Items Addressed This Visit     None      Visit Diagnoses     Hospital discharge follow-up    -  Primary    Yeast infection        Relevant Medications    fluconazole (DIFLUCAN) 150 MG Tab      finish the antibitotics and back to her schedule  Diflucan after the antibiotics.  Continue everything else.    Medication List with Changes/Refills   New Medications    FLUCONAZOLE (DIFLUCAN) 150 MG TAB    Take 1 tablet (150 mg total) by mouth once daily. for 1 day   Current Medications    ALPRAZOLAM (XANAX) 2 MG TAB    Take 1 tablet (2 mg total) by mouth nightly as needed.    CIPROFLOXACIN HCL (CIPRO) 500 MG TABLET    Take 1 tablet (500 mg total) by mouth every 12 (twelve) hours. for 10 days    METOPROLOL TARTRATE (LOPRESSOR) 25 MG TABLET    TAKE 1 TABLET BY MOUTH TWICE DAILY (DISCONTINUE  ATENOLOL)    METRONIDAZOLE (FLAGYL) 500 MG TABLET    Take 1 tablet (500 mg total) by mouth every 8 (eight) hours. for 10 days    ONDANSETRON (ZOFRAN) 4 MG TABLET    Take 1 tablet (4 mg total) by mouth every 6 (six) hours as needed for Nausea.    OXYCODONE-ACETAMINOPHEN (PERCOCET) 5-325 MG PER TABLET    Take 1 tablet by mouth every 6 (six) hours as needed for Pain.    TRAMADOL (ULTRAM) 50 MG TABLET    take 1 TABLET(S) BY MOUTH EVERY 4 hours while awake and 2 TABLET(S) AT BEDTIME         Follow up in about 4 weeks (around 10/19/2020) for assess treatment plan.

## 2020-09-25 ENCOUNTER — PATIENT OUTREACH (OUTPATIENT)
Dept: ADMINISTRATIVE | Facility: OTHER | Age: 55
End: 2020-09-25

## 2020-09-25 ENCOUNTER — OFFICE VISIT (OUTPATIENT)
Dept: SURGERY | Facility: CLINIC | Age: 55
End: 2020-09-25
Payer: COMMERCIAL

## 2020-09-25 VITALS
HEART RATE: 66 BPM | HEIGHT: 60 IN | WEIGHT: 191 LBS | BODY MASS INDEX: 37.5 KG/M2 | DIASTOLIC BLOOD PRESSURE: 95 MMHG | SYSTOLIC BLOOD PRESSURE: 132 MMHG

## 2020-09-25 DIAGNOSIS — K57.20 DIVERTICULITIS OF LARGE INTESTINE WITH ABSCESS WITHOUT BLEEDING: Primary | ICD-10-CM

## 2020-09-25 PROCEDURE — 3080F PR MOST RECENT DIASTOLIC BLOOD PRESSURE >= 90 MM HG: ICD-10-PCS | Mod: CPTII,S$GLB,, | Performed by: SURGERY

## 2020-09-25 PROCEDURE — 3075F PR MOST RECENT SYSTOLIC BLOOD PRESS GE 130-139MM HG: ICD-10-PCS | Mod: CPTII,S$GLB,, | Performed by: SURGERY

## 2020-09-25 PROCEDURE — 3008F PR BODY MASS INDEX (BMI) DOCUMENTED: ICD-10-PCS | Mod: CPTII,S$GLB,, | Performed by: SURGERY

## 2020-09-25 PROCEDURE — 3008F BODY MASS INDEX DOCD: CPT | Mod: CPTII,S$GLB,, | Performed by: SURGERY

## 2020-09-25 PROCEDURE — 3075F SYST BP GE 130 - 139MM HG: CPT | Mod: CPTII,S$GLB,, | Performed by: SURGERY

## 2020-09-25 PROCEDURE — 99999 PR PBB SHADOW E&M-EST. PATIENT-LVL III: CPT | Mod: PBBFAC,,, | Performed by: SURGERY

## 2020-09-25 PROCEDURE — 99214 OFFICE O/P EST MOD 30 MIN: CPT | Mod: S$GLB,,, | Performed by: SURGERY

## 2020-09-25 PROCEDURE — 99214 PR OFFICE/OUTPT VISIT, EST, LEVL IV, 30-39 MIN: ICD-10-PCS | Mod: S$GLB,,, | Performed by: SURGERY

## 2020-09-25 PROCEDURE — 99999 PR PBB SHADOW E&M-EST. PATIENT-LVL III: ICD-10-PCS | Mod: PBBFAC,,, | Performed by: SURGERY

## 2020-09-25 PROCEDURE — 3080F DIAST BP >= 90 MM HG: CPT | Mod: CPTII,S$GLB,, | Performed by: SURGERY

## 2020-09-25 NOTE — PROGRESS NOTES
Surgery Clinic Note    Vane Seo is a 55 y.o. year old female in clinic today for follow up of admission for diverticulitis (small contained perforation) which improved with antibiotics. She feels great. No pain since discharge. Normal BM.  No f/c/n/v/sob/cp    ROS:  Negative except above    Imaging:    CT A/P: acute diverticulitis with pericolonic stranding and small perforation. Fluid in vesicouterine space, Ventral hernia, large right renal cyst, liver cyst.    PE:  Vitals:    09/25/20 0944   BP: (!) 132/95   Pulse: 66     NAD  Ambulates well  No belabored breathing  No skin changes  Abd soft nt nd    A/P:  Vane Seo is a 55 y.o. year old female w complicated diverticulitis, small contained perforation    -schedule c-scope with GI  -rtc after cscope    Ok Santana  General Surgery - [facility]  9/25/2020  10:04 AM

## 2020-10-13 ENCOUNTER — OFFICE VISIT (OUTPATIENT)
Dept: GASTROENTEROLOGY | Facility: CLINIC | Age: 55
End: 2020-10-13
Payer: COMMERCIAL

## 2020-10-13 VITALS
HEIGHT: 65 IN | SYSTOLIC BLOOD PRESSURE: 128 MMHG | DIASTOLIC BLOOD PRESSURE: 92 MMHG | WEIGHT: 167.44 LBS | BODY MASS INDEX: 27.9 KG/M2

## 2020-10-13 DIAGNOSIS — K57.92 ACUTE DIVERTICULITIS: Primary | ICD-10-CM

## 2020-10-13 PROCEDURE — 3080F DIAST BP >= 90 MM HG: CPT | Mod: CPTII,S$GLB,, | Performed by: INTERNAL MEDICINE

## 2020-10-13 PROCEDURE — 99203 OFFICE O/P NEW LOW 30 MIN: CPT | Mod: S$GLB,,, | Performed by: INTERNAL MEDICINE

## 2020-10-13 PROCEDURE — 99999 PR PBB SHADOW E&M-EST. PATIENT-LVL III: ICD-10-PCS | Mod: PBBFAC,,, | Performed by: INTERNAL MEDICINE

## 2020-10-13 PROCEDURE — 3080F PR MOST RECENT DIASTOLIC BLOOD PRESSURE >= 90 MM HG: ICD-10-PCS | Mod: CPTII,S$GLB,, | Performed by: INTERNAL MEDICINE

## 2020-10-13 PROCEDURE — 3074F PR MOST RECENT SYSTOLIC BLOOD PRESSURE < 130 MM HG: ICD-10-PCS | Mod: CPTII,S$GLB,, | Performed by: INTERNAL MEDICINE

## 2020-10-13 PROCEDURE — 99999 PR PBB SHADOW E&M-EST. PATIENT-LVL III: CPT | Mod: PBBFAC,,, | Performed by: INTERNAL MEDICINE

## 2020-10-13 PROCEDURE — 3074F SYST BP LT 130 MM HG: CPT | Mod: CPTII,S$GLB,, | Performed by: INTERNAL MEDICINE

## 2020-10-13 PROCEDURE — 99203 PR OFFICE/OUTPT VISIT, NEW, LEVL III, 30-44 MIN: ICD-10-PCS | Mod: S$GLB,,, | Performed by: INTERNAL MEDICINE

## 2020-10-13 PROCEDURE — 3008F BODY MASS INDEX DOCD: CPT | Mod: CPTII,S$GLB,, | Performed by: INTERNAL MEDICINE

## 2020-10-13 PROCEDURE — 3008F PR BODY MASS INDEX (BMI) DOCUMENTED: ICD-10-PCS | Mod: CPTII,S$GLB,, | Performed by: INTERNAL MEDICINE

## 2020-10-13 NOTE — LETTER
October 13, 2020      Dino Mcgraw MD  [address]  [place] [address]           [facility]  [address]   [place] [address]  Phone: [phone]  Fax: [phone]          Patient: Vane Seo   MR Number: [id number]   YOB: 1965   Date of Visit: 10/13/2020       Dear Dr. Dino Mcgraw:    Thank you for referring Vane Seo to me for evaluation. Attached you will find relevant portions of my assessment and plan of care.    If you have questions, please do not hesitate to call me. I look forward to following Vane Seo along with you.    Sincerely,    Asa Ulrich MD    Enclosure  CC:  No Recipients    If you would like to receive this communication electronically, please contact [email or web address] or [phone] to request more information on [employer] Link access.    For providers and/or their staff who would like to refer a patient to [facility], please contact us through our one-stop-shop provider referral line, [facility], at [phone].    If you feel you have received this communication in error or would no longer like to receive these types of communications, please e-mail [email or web address]

## 2020-10-13 NOTE — PROGRESS NOTES
Subjective:       Patient ID: Vane Seo is a 55 y.o. female.    Chief Complaint: GI Problem (diverticulitis)    HPI       This is a 55-year-old lady with a consult today for diverticulitis.  She has no prior history of gastrointestinal problems.  In mid September she was admitted with severe abdominal pain.  This had lasted several days before she sought medical attention.  Was generalized abdominal pain throughout the lower abdomen.  It was a constant pain.  After having the pain for 2 or 3 days went to urgent care and then was admitted.  CT scan demonstrated diverticulitis with a micro perforation.  She was admitted to the hospital for pain medicine IV fluids and antibiotics.  The pain rapidly resolved.  She completed the antibiotic course at home.    Since that time she is feeling much better the pain has resolved.  There is no tenderness.  No fever.  She has a long history of irregular bowel movements with diarrhea alternating with constipation.  That is her baseline.  After taking antibiotics her stools became somewhat loose.  She started on probiotics and now they are becoming more formed.  She denies any nausea vomiting.  She has rare reflux.  This is her only episode diverticulitis.      Review of Systems   Constitutional: Negative for activity change, appetite change, chills, diaphoresis, fatigue, fever and unexpected weight change.   HENT: Negative for nasal congestion, ear pain, mouth sores, nosebleeds, postnasal drip, rhinorrhea, sinus pressure/congestion, sore throat, trouble swallowing and voice change.    Eyes: Negative for pain.   Respiratory: Negative for cough, shortness of breath and wheezing.    Cardiovascular: Negative for chest pain, palpitations and leg swelling.   Genitourinary: Negative for difficulty urinating, dysuria, flank pain, hematuria and menstrual problem.   Musculoskeletal: Positive for arthralgias and myalgias. Negative for back pain, gait problem, joint swelling and  neck pain.   Integumentary:  Negative for rash.   Neurological: Negative for dizziness, tremors, syncope, numbness and headaches.   Hematological: Negative for adenopathy. Does not bruise/bleed easily.   Psychiatric/Behavioral: Negative for agitation, behavioral problems, confusion, decreased concentration and dysphoric mood. The patient is not nervous/anxious.          Objective:      Physical Exam  Constitutional:       General: She is not in acute distress.     Appearance: She is well-developed.   HENT:      Head: Normocephalic and atraumatic.      Right Ear: External ear normal.      Left Ear: External ear normal.      Nose: Nose normal.      Mouth/Throat:      Pharynx: No oropharyngeal exudate.   Eyes:      General: No scleral icterus.     Conjunctiva/sclera: Conjunctivae normal.      Pupils: Pupils are equal, round, and reactive to light.   Neck:      Musculoskeletal: Normal range of motion and neck supple.      Thyroid: No thyromegaly.   Cardiovascular:      Rate and Rhythm: Normal rate and regular rhythm.      Heart sounds: Normal heart sounds. No murmur. No gallop.    Pulmonary:      Effort: Pulmonary effort is normal.      Breath sounds: Normal breath sounds. No wheezing or rales.   Abdominal:      General: Abdomen is flat. Bowel sounds are normal. There is no distension. There are no signs of injury.      Palpations: There is no shifting dullness, hepatomegaly or mass.      Tenderness: There is no abdominal tenderness.      Hernia: A hernia is present. Hernia is present in the ventral area.   Musculoskeletal: Normal range of motion.         General: No tenderness.   Lymphadenopathy:      Cervical: No cervical adenopathy.   Skin:     General: Skin is warm and dry.      Findings: No rash.   Neurological:      Mental Status: She is alert and oriented to person, place, and time.      Cranial Nerves: No cranial nerve deficit.   Psychiatric:         Behavior: Behavior normal.         Thought Content: Thought  content normal.         Judgment: Judgment normal.         Assessment:       1. Acute diverticulitis        Plan:       Acute diverticulitis complicated by micro abscess.  I recommended colonoscopy but I told her that it is important to wait 6-8 weeks after an episode of acute diverticulitis before doing colonoscopy.  Purpose of the exam is to rule out other pathology.  I think if we set this up at the end of this month or the beginning of November that will be appropriate timeline.  She did have follow-up lab work 1 week after discharge which showed a normal white blood cell count.  On exam today there is no tenderness in the left lower quadrant, she does have a large ventral hernia.  I explained colonoscopy and put the orders in today.

## 2020-12-11 ENCOUNTER — PATIENT MESSAGE (OUTPATIENT)
Dept: OTHER | Facility: OTHER | Age: 55
End: 2020-12-11

## 2021-02-19 ENCOUNTER — TELEPHONE (OUTPATIENT)
Dept: ENDOSCOPY | Facility: HOSPITAL | Age: 56
End: 2021-02-19

## 2021-03-15 DIAGNOSIS — M79.7 FIBROMYALGIA: ICD-10-CM

## 2021-03-16 RX ORDER — TRAMADOL HYDROCHLORIDE 50 MG/1
TABLET ORAL
Qty: 150 TABLET | Refills: 5 | OUTPATIENT
Start: 2021-03-16

## 2021-04-26 ENCOUNTER — PATIENT MESSAGE (OUTPATIENT)
Dept: ADMINISTRATIVE | Facility: HOSPITAL | Age: 56
End: 2021-04-26

## 2021-04-26 ENCOUNTER — PATIENT OUTREACH (OUTPATIENT)
Dept: ADMINISTRATIVE | Facility: HOSPITAL | Age: 56
End: 2021-04-26

## 2021-04-26 DIAGNOSIS — Z12.31 ENCOUNTER FOR SCREENING MAMMOGRAM FOR BREAST CANCER: Primary | ICD-10-CM

## 2021-05-05 ENCOUNTER — HOSPITAL ENCOUNTER (OUTPATIENT)
Facility: HOSPITAL | Age: 56
Discharge: HOME | End: 2021-05-06
Attending: EMERGENCY MEDICINE | Admitting: EMERGENCY MEDICINE
Payer: COMMERCIAL

## 2021-05-05 DIAGNOSIS — R07.9 CHEST PAIN: ICD-10-CM

## 2021-05-05 DIAGNOSIS — F41.9 ANXIETY: ICD-10-CM

## 2021-05-05 DIAGNOSIS — I10 ESSENTIAL HYPERTENSION: ICD-10-CM

## 2021-05-05 DIAGNOSIS — K57.20 DIVERTICULITIS OF LARGE INTESTINE WITH ABSCESS WITHOUT BLEEDING: ICD-10-CM

## 2021-05-05 DIAGNOSIS — I10 HYPERTENSION, UNSPECIFIED TYPE: Primary | ICD-10-CM

## 2021-05-05 LAB
ALBUMIN SERPL BCP-MCNC: 4.1 G/DL (ref 3.5–5.2)
ALP SERPL-CCNC: 99 U/L (ref 55–135)
ALT SERPL W/O P-5'-P-CCNC: 35 U/L (ref 10–44)
ANION GAP SERPL CALC-SCNC: 8 MMOL/L (ref 8–16)
AST SERPL-CCNC: 25 U/L (ref 10–40)
BASOPHILS # BLD AUTO: 0.04 K/UL (ref 0–0.2)
BASOPHILS NFR BLD: 0.7 % (ref 0–1.9)
BILIRUB SERPL-MCNC: 0.5 MG/DL (ref 0.1–1)
BILIRUB UR QL STRIP: NEGATIVE
BNP SERPL-MCNC: 52 PG/ML (ref 0–99)
BUN SERPL-MCNC: 14 MG/DL (ref 6–20)
CALCIUM SERPL-MCNC: 9.7 MG/DL (ref 8.7–10.5)
CHLORIDE SERPL-SCNC: 105 MMOL/L (ref 95–110)
CLARITY UR: CLEAR
CO2 SERPL-SCNC: 29 MMOL/L (ref 23–29)
COLOR UR: YELLOW
CREAT SERPL-MCNC: 0.8 MG/DL (ref 0.5–1.4)
CTP QC/QA: YES
D DIMER PPP IA.FEU-MCNC: 0.19 MG/L FEU
DIFFERENTIAL METHOD: ABNORMAL
EOSINOPHIL # BLD AUTO: 0.1 K/UL (ref 0–0.5)
EOSINOPHIL NFR BLD: 2 % (ref 0–8)
ERYTHROCYTE [DISTWIDTH] IN BLOOD BY AUTOMATED COUNT: 13 % (ref 11.5–14.5)
EST. GFR  (AFRICAN AMERICAN): >60 ML/MIN/1.73 M^2
EST. GFR  (NON AFRICAN AMERICAN): >60 ML/MIN/1.73 M^2
GLUCOSE SERPL-MCNC: 91 MG/DL (ref 70–110)
GLUCOSE UR QL STRIP: NEGATIVE
HCT VFR BLD AUTO: 43 % (ref 37–48.5)
HGB BLD-MCNC: 13.6 G/DL (ref 12–16)
HGB UR QL STRIP: NEGATIVE
IMM GRANULOCYTES # BLD AUTO: 0.03 K/UL (ref 0–0.04)
IMM GRANULOCYTES NFR BLD AUTO: 0.5 % (ref 0–0.5)
KETONES UR QL STRIP: ABNORMAL
LEUKOCYTE ESTERASE UR QL STRIP: NEGATIVE
LYMPHOCYTES # BLD AUTO: 2.1 K/UL (ref 1–4.8)
LYMPHOCYTES NFR BLD: 38.6 % (ref 18–48)
MCH RBC QN AUTO: 26.6 PG (ref 27–31)
MCHC RBC AUTO-ENTMCNC: 31.6 G/DL (ref 32–36)
MCV RBC AUTO: 84 FL (ref 82–98)
MONOCYTES # BLD AUTO: 0.3 K/UL (ref 0.3–1)
MONOCYTES NFR BLD: 5.8 % (ref 4–15)
NEUTROPHILS # BLD AUTO: 2.9 K/UL (ref 1.8–7.7)
NEUTROPHILS NFR BLD: 52.4 % (ref 38–73)
NITRITE UR QL STRIP: NEGATIVE
NRBC BLD-RTO: 0 /100 WBC
PH UR STRIP: 6 [PH] (ref 5–8)
PLATELET # BLD AUTO: 226 K/UL (ref 150–450)
PMV BLD AUTO: 10.5 FL (ref 9.2–12.9)
POTASSIUM SERPL-SCNC: 4 MMOL/L (ref 3.5–5.1)
PROT SERPL-MCNC: 7.7 G/DL (ref 6–8.4)
PROT UR QL STRIP: NEGATIVE
RBC # BLD AUTO: 5.11 M/UL (ref 4–5.4)
SARS-COV-2 RDRP RESP QL NAA+PROBE: NEGATIVE
SODIUM SERPL-SCNC: 142 MMOL/L (ref 136–145)
SP GR UR STRIP: 1.01 (ref 1–1.03)
TROPONIN I SERPL DL<=0.01 NG/ML-MCNC: <0.006 NG/ML (ref 0–0.03)
TROPONIN I SERPL DL<=0.01 NG/ML-MCNC: <0.006 NG/ML (ref 0–0.03)
TSH SERPL DL<=0.005 MIU/L-ACNC: 1.26 UIU/ML (ref 0.4–4)
URN SPEC COLLECT METH UR: ABNORMAL
UROBILINOGEN UR STRIP-ACNC: NEGATIVE EU/DL
WBC # BLD AUTO: 5.55 K/UL (ref 3.9–12.7)

## 2021-05-05 PROCEDURE — G0378 HOSPITAL OBSERVATION PER HR: HCPCS

## 2021-05-05 PROCEDURE — U0002 COVID-19 LAB TEST NON-CDC: HCPCS | Performed by: EMERGENCY MEDICINE

## 2021-05-05 PROCEDURE — 84443 ASSAY THYROID STIM HORMONE: CPT | Performed by: HOSPITALIST

## 2021-05-05 PROCEDURE — 83880 ASSAY OF NATRIURETIC PEPTIDE: CPT | Performed by: PHYSICIAN ASSISTANT

## 2021-05-05 PROCEDURE — 81003 URINALYSIS AUTO W/O SCOPE: CPT | Performed by: EMERGENCY MEDICINE

## 2021-05-05 PROCEDURE — 85379 FIBRIN DEGRADATION QUANT: CPT | Performed by: EMERGENCY MEDICINE

## 2021-05-05 PROCEDURE — 84484 ASSAY OF TROPONIN QUANT: CPT | Performed by: PHYSICIAN ASSISTANT

## 2021-05-05 PROCEDURE — 93005 ELECTROCARDIOGRAM TRACING: CPT

## 2021-05-05 PROCEDURE — 80053 COMPREHEN METABOLIC PANEL: CPT | Performed by: PHYSICIAN ASSISTANT

## 2021-05-05 PROCEDURE — 93010 ELECTROCARDIOGRAM REPORT: CPT | Mod: ,,, | Performed by: INTERNAL MEDICINE

## 2021-05-05 PROCEDURE — 85025 COMPLETE CBC W/AUTO DIFF WBC: CPT | Performed by: PHYSICIAN ASSISTANT

## 2021-05-05 PROCEDURE — 93010 EKG 12-LEAD: ICD-10-PCS | Mod: ,,, | Performed by: INTERNAL MEDICINE

## 2021-05-05 PROCEDURE — 99291 CRITICAL CARE FIRST HOUR: CPT | Mod: 25

## 2021-05-05 PROCEDURE — 25000003 PHARM REV CODE 250: Performed by: EMERGENCY MEDICINE

## 2021-05-05 PROCEDURE — 84484 ASSAY OF TROPONIN QUANT: CPT | Mod: 91 | Performed by: HOSPITALIST

## 2021-05-05 RX ORDER — ASPIRIN 325 MG
325 TABLET ORAL
Status: COMPLETED | OUTPATIENT
Start: 2021-05-05 | End: 2021-05-05

## 2021-05-05 RX ORDER — ENOXAPARIN SODIUM 100 MG/ML
40 INJECTION SUBCUTANEOUS EVERY 24 HOURS
Status: DISCONTINUED | OUTPATIENT
Start: 2021-05-05 | End: 2021-05-06 | Stop reason: DRUGHIGH

## 2021-05-05 RX ORDER — METOPROLOL TARTRATE 25 MG/1
25 TABLET, FILM COATED ORAL
Status: COMPLETED | OUTPATIENT
Start: 2021-05-05 | End: 2021-05-05

## 2021-05-05 RX ORDER — SODIUM CHLORIDE 0.9 % (FLUSH) 0.9 %
10 SYRINGE (ML) INJECTION
Status: DISCONTINUED | OUTPATIENT
Start: 2021-05-05 | End: 2021-05-06 | Stop reason: HOSPADM

## 2021-05-05 RX ORDER — TRAMADOL HYDROCHLORIDE 50 MG/1
50 TABLET ORAL
Status: COMPLETED | OUTPATIENT
Start: 2021-05-05 | End: 2021-05-05

## 2021-05-05 RX ORDER — METOPROLOL TARTRATE 25 MG/1
25 TABLET, FILM COATED ORAL 2 TIMES DAILY
Status: DISCONTINUED | OUTPATIENT
Start: 2021-05-05 | End: 2021-05-06 | Stop reason: HOSPADM

## 2021-05-05 RX ORDER — TRAMADOL HYDROCHLORIDE 50 MG/1
50 TABLET ORAL EVERY 4 HOURS PRN
Status: DISCONTINUED | OUTPATIENT
Start: 2021-05-05 | End: 2021-05-06 | Stop reason: HOSPADM

## 2021-05-05 RX ORDER — ACETAMINOPHEN 325 MG/1
650 TABLET ORAL EVERY 6 HOURS PRN
Status: DISCONTINUED | OUTPATIENT
Start: 2021-05-05 | End: 2021-05-06 | Stop reason: HOSPADM

## 2021-05-05 RX ORDER — ONDANSETRON 2 MG/ML
4 INJECTION INTRAMUSCULAR; INTRAVENOUS EVERY 6 HOURS PRN
Status: DISCONTINUED | OUTPATIENT
Start: 2021-05-05 | End: 2021-05-06 | Stop reason: HOSPADM

## 2021-05-05 RX ORDER — TALC
6 POWDER (GRAM) TOPICAL NIGHTLY PRN
Status: DISCONTINUED | OUTPATIENT
Start: 2021-05-05 | End: 2021-05-06 | Stop reason: HOSPADM

## 2021-05-05 RX ADMIN — METOPROLOL TARTRATE 25 MG: 25 TABLET, FILM COATED ORAL at 08:05

## 2021-05-05 RX ADMIN — ASPIRIN 325 MG ORAL TABLET 325 MG: 325 PILL ORAL at 08:05

## 2021-05-05 RX ADMIN — TRAMADOL HYDROCHLORIDE 50 MG: 50 TABLET, FILM COATED ORAL at 08:05

## 2021-05-06 VITALS
SYSTOLIC BLOOD PRESSURE: 151 MMHG | BODY MASS INDEX: 29.88 KG/M2 | OXYGEN SATURATION: 98 % | HEIGHT: 64 IN | RESPIRATION RATE: 18 BRPM | WEIGHT: 175 LBS | TEMPERATURE: 97 F | DIASTOLIC BLOOD PRESSURE: 69 MMHG | HEART RATE: 75 BPM

## 2021-05-06 LAB
AORTIC ROOT ANNULUS: 3.02 CM
AORTIC VALVE CUSP SEPERATION: 1.88 CM
ASCENDING AORTA: 3.27 CM
AV INDEX (PROSTH): 0.72
AV MEAN GRADIENT: 6 MMHG
AV PEAK GRADIENT: 10 MMHG
AV VALVE AREA: 2.4 CM2
AV VELOCITY RATIO: 0.81
BSA FOR ECHO PROCEDURE: 1.89 M2
CHOLEST SERPL-MCNC: 176 MG/DL (ref 120–199)
CHOLEST/HDLC SERPL: 3.2 {RATIO} (ref 2–5)
CV ECHO LV RWT: 0.5 CM
CV STRESS BASE HR: 67 BPM
DIASTOLIC BLOOD PRESSURE: 96 MMHG
DOP CALC AO PEAK VEL: 1.55 M/S
DOP CALC AO VTI: 34.45 CM
DOP CALC LVOT AREA: 3.3 CM2
DOP CALC LVOT DIAMETER: 2.06 CM
DOP CALC LVOT PEAK VEL: 1.25 M/S
DOP CALC LVOT STROKE VOLUME: 82.81 CM3
DOP CALCLVOT PEAK VEL VTI: 24.86 CM
E WAVE DECELERATION TIME: 235.54 MSEC
E/A RATIO: 0.78
E/E' RATIO: 11.43 M/S
ECHO LV POSTERIOR WALL: 1.18 CM (ref 0.6–1.1)
EJECTION FRACTION: 65 %
FRACTIONAL SHORTENING: 31 % (ref 28–44)
HDLC SERPL-MCNC: 55 MG/DL (ref 40–75)
HDLC SERPL: 31.3 % (ref 20–50)
INTERVENTRICULAR SEPTUM: 1.22 CM (ref 0.6–1.1)
IVRT: 59.98 MSEC
LA MAJOR: 4.3 CM
LA MINOR: 5.33 CM
LA WIDTH: 4.37 CM
LDLC SERPL CALC-MCNC: 105.8 MG/DL (ref 63–159)
LEFT ATRIUM SIZE: 4.01 CM
LEFT ATRIUM VOLUME INDEX: 38.3 ML/M2
LEFT ATRIUM VOLUME: 70.9 CM3
LEFT INTERNAL DIMENSION IN SYSTOLE: 3.22 CM (ref 2.1–4)
LEFT VENTRICLE DIASTOLIC VOLUME INDEX: 54.91 ML/M2
LEFT VENTRICLE DIASTOLIC VOLUME: 101.58 ML
LEFT VENTRICLE MASS INDEX: 114 G/M2
LEFT VENTRICLE SYSTOLIC VOLUME INDEX: 22.4 ML/M2
LEFT VENTRICLE SYSTOLIC VOLUME: 41.52 ML
LEFT VENTRICULAR INTERNAL DIMENSION IN DIASTOLE: 4.68 CM (ref 3.5–6)
LEFT VENTRICULAR MASS: 210.59 G
LV LATERAL E/E' RATIO: 10 M/S
LV SEPTAL E/E' RATIO: 13.33 M/S
MV PEAK A VEL: 1.02 M/S
MV PEAK E VEL: 0.8 M/S
NONHDLC SERPL-MCNC: 121 MG/DL
NUC REST EJECTION FRACTION: 70
OHS CV CPX 85 PERCENT MAX PREDICTED HEART RATE MALE: 135
OHS CV CPX MAX PREDICTED HEART RATE: 158
OHS CV CPX PATIENT IS FEMALE: 1
OHS CV CPX PATIENT IS MALE: 0
OHS CV CPX PEAK DIASTOLIC BLOOD PRESSURE: 89 MMHG
OHS CV CPX PEAK HEAR RATE: 100 BPM
OHS CV CPX PEAK RATE PRESSURE PRODUCT: NORMAL
OHS CV CPX PEAK SYSTOLIC BLOOD PRESSURE: 126 MMHG
OHS CV CPX PERCENT MAX PREDICTED HEART RATE ACHIEVED: 63
OHS CV CPX RATE PRESSURE PRODUCT PRESENTING: 9313
PISA TR MAX VEL: 2.58 M/S
PULM VEIN S/D RATIO: 1.45
PV PEAK D VEL: 0.4 M/S
PV PEAK S VEL: 0.58 M/S
PV PEAK VELOCITY: 0.8 CM/S
RA MAJOR: 3.65 CM
RA PRESSURE: 3 MMHG
RA WIDTH: 3.14 CM
RIGHT VENTRICULAR END-DIASTOLIC DIMENSION: 2.39 CM
SINUS: 2.79 CM
STJ: 2.64 CM
SYSTOLIC BLOOD PRESSURE: 139 MMHG
TDI LATERAL: 0.08 M/S
TDI SEPTAL: 0.06 M/S
TDI: 0.07 M/S
TR MAX PG: 27 MMHG
TRICUSPID ANNULAR PLANE SYSTOLIC EXCURSION: 1.85 CM
TRIGL SERPL-MCNC: 76 MG/DL (ref 30–150)
TROPONIN I SERPL DL<=0.01 NG/ML-MCNC: <0.006 NG/ML (ref 0–0.03)
TV REST PULMONARY ARTERY PRESSURE: 30 MMHG

## 2021-05-06 PROCEDURE — 63600175 PHARM REV CODE 636 W HCPCS: Performed by: INTERNAL MEDICINE

## 2021-05-06 PROCEDURE — 36415 COLL VENOUS BLD VENIPUNCTURE: CPT | Performed by: HOSPITALIST

## 2021-05-06 PROCEDURE — 99220 PR INITIAL OBSERVATION CARE,LEVL III: ICD-10-PCS | Mod: ,,, | Performed by: INTERNAL MEDICINE

## 2021-05-06 PROCEDURE — 25000003 PHARM REV CODE 250: Performed by: HOSPITALIST

## 2021-05-06 PROCEDURE — 99220 PR INITIAL OBSERVATION CARE,LEVL III: CPT | Mod: ,,, | Performed by: INTERNAL MEDICINE

## 2021-05-06 PROCEDURE — 84484 ASSAY OF TROPONIN QUANT: CPT | Performed by: HOSPITALIST

## 2021-05-06 PROCEDURE — 63600175 PHARM REV CODE 636 W HCPCS: Performed by: HOSPITALIST

## 2021-05-06 PROCEDURE — G0378 HOSPITAL OBSERVATION PER HR: HCPCS

## 2021-05-06 PROCEDURE — 96372 THER/PROPH/DIAG INJ SC/IM: CPT | Mod: 59

## 2021-05-06 PROCEDURE — 80061 LIPID PANEL: CPT | Performed by: HOSPITALIST

## 2021-05-06 RX ORDER — ENOXAPARIN SODIUM 100 MG/ML
40 INJECTION SUBCUTANEOUS EVERY 24 HOURS
Status: DISCONTINUED | OUTPATIENT
Start: 2021-05-06 | End: 2021-05-06 | Stop reason: HOSPADM

## 2021-05-06 RX ORDER — REGADENOSON 0.08 MG/ML
0.4 INJECTION, SOLUTION INTRAVENOUS ONCE
Status: COMPLETED | OUTPATIENT
Start: 2021-05-06 | End: 2021-05-06

## 2021-05-06 RX ADMIN — METOPROLOL TARTRATE 25 MG: 25 TABLET, FILM COATED ORAL at 10:05

## 2021-05-06 RX ADMIN — TRAMADOL HYDROCHLORIDE 50 MG: 50 TABLET, FILM COATED ORAL at 01:05

## 2021-05-06 RX ADMIN — REGADENOSON 0.4 MG: 0.08 INJECTION, SOLUTION INTRAVENOUS at 11:05

## 2021-05-06 RX ADMIN — ENOXAPARIN SODIUM 40 MG: 40 INJECTION SUBCUTANEOUS at 01:05

## 2021-05-06 RX ADMIN — TRAMADOL HYDROCHLORIDE 50 MG: 50 TABLET, FILM COATED ORAL at 04:05

## 2021-05-06 RX ADMIN — ENOXAPARIN SODIUM 40 MG: 40 INJECTION SUBCUTANEOUS at 04:05

## 2021-05-06 RX ADMIN — TRAMADOL HYDROCHLORIDE 50 MG: 50 TABLET, FILM COATED ORAL at 10:05

## 2021-05-07 ENCOUNTER — PATIENT OUTREACH (OUTPATIENT)
Dept: ADMINISTRATIVE | Facility: OTHER | Age: 56
End: 2021-05-07

## 2021-05-07 DIAGNOSIS — Z12.11 COLON CANCER SCREENING: Primary | ICD-10-CM

## 2021-05-10 ENCOUNTER — OFFICE VISIT (OUTPATIENT)
Dept: FAMILY MEDICINE | Facility: CLINIC | Age: 56
End: 2021-05-10
Payer: COMMERCIAL

## 2021-05-10 VITALS
OXYGEN SATURATION: 99 % | TEMPERATURE: 98 F | HEART RATE: 81 BPM | DIASTOLIC BLOOD PRESSURE: 82 MMHG | SYSTOLIC BLOOD PRESSURE: 130 MMHG | BODY MASS INDEX: 30.86 KG/M2 | HEIGHT: 63 IN | WEIGHT: 174.19 LBS

## 2021-05-10 DIAGNOSIS — F41.9 ANXIETY: ICD-10-CM

## 2021-05-10 DIAGNOSIS — R30.0 DYSURIA: Primary | ICD-10-CM

## 2021-05-10 LAB
BILIRUB UR QL STRIP: NEGATIVE
CLARITY UR: CLEAR
COLOR UR: YELLOW
GLUCOSE UR QL STRIP: NEGATIVE
HGB UR QL STRIP: ABNORMAL
KETONES UR QL STRIP: NEGATIVE
LEUKOCYTE ESTERASE UR QL STRIP: NEGATIVE
NITRITE UR QL STRIP: NEGATIVE
PH UR STRIP: 5 [PH] (ref 5–8)
PROT UR QL STRIP: NEGATIVE
SP GR UR STRIP: 1.02 (ref 1–1.03)
URN SPEC COLLECT METH UR: ABNORMAL
UROBILINOGEN UR STRIP-ACNC: NEGATIVE EU/DL

## 2021-05-10 PROCEDURE — 99214 OFFICE O/P EST MOD 30 MIN: CPT | Mod: S$GLB,,, | Performed by: FAMILY MEDICINE

## 2021-05-10 PROCEDURE — 3008F BODY MASS INDEX DOCD: CPT | Mod: CPTII,S$GLB,, | Performed by: FAMILY MEDICINE

## 2021-05-10 PROCEDURE — 3008F PR BODY MASS INDEX (BMI) DOCUMENTED: ICD-10-PCS | Mod: CPTII,S$GLB,, | Performed by: FAMILY MEDICINE

## 2021-05-10 PROCEDURE — 1126F AMNT PAIN NOTED NONE PRSNT: CPT | Mod: S$GLB,,, | Performed by: FAMILY MEDICINE

## 2021-05-10 PROCEDURE — 81003 URINALYSIS AUTO W/O SCOPE: CPT | Performed by: FAMILY MEDICINE

## 2021-05-10 PROCEDURE — 99214 PR OFFICE/OUTPT VISIT, EST, LEVL IV, 30-39 MIN: ICD-10-PCS | Mod: S$GLB,,, | Performed by: FAMILY MEDICINE

## 2021-05-10 PROCEDURE — 1126F PR PAIN SEVERITY QUANTIFIED, NO PAIN PRESENT: ICD-10-PCS | Mod: S$GLB,,, | Performed by: FAMILY MEDICINE

## 2021-05-10 PROCEDURE — 99999 PR PBB SHADOW E&M-EST. PATIENT-LVL III: ICD-10-PCS | Mod: PBBFAC,,, | Performed by: FAMILY MEDICINE

## 2021-05-10 PROCEDURE — 99999 PR PBB SHADOW E&M-EST. PATIENT-LVL III: CPT | Mod: PBBFAC,,, | Performed by: FAMILY MEDICINE

## 2021-05-10 RX ORDER — ALPRAZOLAM 2 MG/1
2 TABLET ORAL NIGHTLY PRN
Qty: 25 TABLET | Refills: 1 | Status: SHIPPED | OUTPATIENT
Start: 2021-05-10 | End: 2021-09-21 | Stop reason: SDUPTHER

## 2021-05-10 RX ORDER — ESCITALOPRAM OXALATE 10 MG/1
10 TABLET ORAL DAILY
Qty: 90 TABLET | Refills: 3 | Status: SHIPPED | OUTPATIENT
Start: 2021-05-10 | End: 2021-09-07 | Stop reason: SDUPTHER

## 2021-05-12 ENCOUNTER — OFFICE VISIT (OUTPATIENT)
Dept: CARDIOLOGY | Facility: CLINIC | Age: 56
End: 2021-05-12
Payer: COMMERCIAL

## 2021-05-12 VITALS
HEIGHT: 63 IN | WEIGHT: 176.5 LBS | DIASTOLIC BLOOD PRESSURE: 90 MMHG | BODY MASS INDEX: 31.27 KG/M2 | SYSTOLIC BLOOD PRESSURE: 140 MMHG | OXYGEN SATURATION: 98 % | HEART RATE: 80 BPM

## 2021-05-12 DIAGNOSIS — M79.7 FIBROMYALGIA: ICD-10-CM

## 2021-05-12 DIAGNOSIS — C50.911 MALIGNANT NEOPLASM OF RIGHT BREAST IN FEMALE, ESTROGEN RECEPTOR POSITIVE, UNSPECIFIED SITE OF BREAST: ICD-10-CM

## 2021-05-12 DIAGNOSIS — Z17.0 MALIGNANT NEOPLASM OF RIGHT BREAST IN FEMALE, ESTROGEN RECEPTOR POSITIVE, UNSPECIFIED SITE OF BREAST: ICD-10-CM

## 2021-05-12 DIAGNOSIS — I10 ESSENTIAL HYPERTENSION: ICD-10-CM

## 2021-05-12 DIAGNOSIS — R07.9 CHEST PAIN, UNSPECIFIED TYPE: Primary | ICD-10-CM

## 2021-05-12 DIAGNOSIS — R94.39 ABNORMAL STRESS TEST: ICD-10-CM

## 2021-05-12 DIAGNOSIS — F41.9 ANXIETY: ICD-10-CM

## 2021-05-12 PROCEDURE — 1126F AMNT PAIN NOTED NONE PRSNT: CPT | Mod: S$GLB,,, | Performed by: INTERNAL MEDICINE

## 2021-05-12 PROCEDURE — 99215 PR OFFICE/OUTPT VISIT, EST, LEVL V, 40-54 MIN: ICD-10-PCS | Mod: S$GLB,,, | Performed by: INTERNAL MEDICINE

## 2021-05-12 PROCEDURE — 99215 OFFICE O/P EST HI 40 MIN: CPT | Mod: S$GLB,,, | Performed by: INTERNAL MEDICINE

## 2021-05-12 PROCEDURE — 3008F BODY MASS INDEX DOCD: CPT | Mod: CPTII,S$GLB,, | Performed by: INTERNAL MEDICINE

## 2021-05-12 PROCEDURE — 3008F PR BODY MASS INDEX (BMI) DOCUMENTED: ICD-10-PCS | Mod: CPTII,S$GLB,, | Performed by: INTERNAL MEDICINE

## 2021-05-12 PROCEDURE — 99999 PR PBB SHADOW E&M-EST. PATIENT-LVL III: CPT | Mod: PBBFAC,,, | Performed by: INTERNAL MEDICINE

## 2021-05-12 PROCEDURE — 1126F PR PAIN SEVERITY QUANTIFIED, NO PAIN PRESENT: ICD-10-PCS | Mod: S$GLB,,, | Performed by: INTERNAL MEDICINE

## 2021-05-12 PROCEDURE — 99999 PR PBB SHADOW E&M-EST. PATIENT-LVL III: ICD-10-PCS | Mod: PBBFAC,,, | Performed by: INTERNAL MEDICINE

## 2021-05-12 RX ORDER — CLOPIDOGREL 300 MG/1
600 TABLET, FILM COATED ORAL ONCE
Status: CANCELLED | OUTPATIENT
Start: 2021-05-19

## 2021-05-12 RX ORDER — SODIUM CHLORIDE 9 MG/ML
INJECTION, SOLUTION INTRAVENOUS CONTINUOUS
Status: CANCELLED | OUTPATIENT
Start: 2021-05-12

## 2021-05-12 RX ORDER — DIPHENHYDRAMINE HCL 25 MG
50 CAPSULE ORAL ONCE
Status: CANCELLED | OUTPATIENT
Start: 2021-05-12 | End: 2021-05-12

## 2021-05-17 ENCOUNTER — HOSPITAL ENCOUNTER (OUTPATIENT)
Dept: PREADMISSION TESTING | Facility: HOSPITAL | Age: 56
Discharge: HOME | End: 2021-05-17
Attending: INTERNAL MEDICINE
Payer: COMMERCIAL

## 2021-05-17 VITALS
DIASTOLIC BLOOD PRESSURE: 85 MMHG | SYSTOLIC BLOOD PRESSURE: 138 MMHG | OXYGEN SATURATION: 96 % | BODY MASS INDEX: 30.86 KG/M2 | RESPIRATION RATE: 17 BRPM | HEART RATE: 72 BPM | TEMPERATURE: 98 F | WEIGHT: 174.19 LBS | HEIGHT: 63 IN

## 2021-05-17 DIAGNOSIS — R07.9 CHEST PAIN, UNSPECIFIED TYPE: ICD-10-CM

## 2021-05-17 DIAGNOSIS — Z01.812 ENCOUNTER FOR PREOPERATIVE SCREENING LABORATORY TESTING FOR COVID-19 VIRUS: ICD-10-CM

## 2021-05-17 DIAGNOSIS — Z11.52 ENCOUNTER FOR PREOPERATIVE SCREENING LABORATORY TESTING FOR COVID-19 VIRUS: ICD-10-CM

## 2021-05-17 LAB
ANION GAP SERPL CALC-SCNC: 11 MMOL/L (ref 8–16)
BASOPHILS # BLD AUTO: 0.05 K/UL (ref 0–0.2)
BASOPHILS NFR BLD: 0.8 % (ref 0–1.9)
BUN SERPL-MCNC: 16 MG/DL (ref 6–20)
CALCIUM SERPL-MCNC: 9.3 MG/DL (ref 8.7–10.5)
CHLORIDE SERPL-SCNC: 103 MMOL/L (ref 95–110)
CO2 SERPL-SCNC: 27 MMOL/L (ref 23–29)
CREAT SERPL-MCNC: 0.8 MG/DL (ref 0.5–1.4)
DIFFERENTIAL METHOD: ABNORMAL
EOSINOPHIL # BLD AUTO: 0.1 K/UL (ref 0–0.5)
EOSINOPHIL NFR BLD: 2.1 % (ref 0–8)
ERYTHROCYTE [DISTWIDTH] IN BLOOD BY AUTOMATED COUNT: 12.9 % (ref 11.5–14.5)
EST. GFR  (AFRICAN AMERICAN): >60 ML/MIN/1.73 M^2
EST. GFR  (NON AFRICAN AMERICAN): >60 ML/MIN/1.73 M^2
GLUCOSE SERPL-MCNC: 94 MG/DL (ref 70–110)
HCT VFR BLD AUTO: 44.8 % (ref 37–48.5)
HGB BLD-MCNC: 14.6 G/DL (ref 12–16)
IMM GRANULOCYTES # BLD AUTO: 0.04 K/UL (ref 0–0.04)
IMM GRANULOCYTES NFR BLD AUTO: 0.6 % (ref 0–0.5)
LYMPHOCYTES # BLD AUTO: 2.5 K/UL (ref 1–4.8)
LYMPHOCYTES NFR BLD: 38.2 % (ref 18–48)
MCH RBC QN AUTO: 26.9 PG (ref 27–31)
MCHC RBC AUTO-ENTMCNC: 32.6 G/DL (ref 32–36)
MCV RBC AUTO: 83 FL (ref 82–98)
MONOCYTES # BLD AUTO: 0.4 K/UL (ref 0.3–1)
MONOCYTES NFR BLD: 6.6 % (ref 4–15)
NEUTROPHILS # BLD AUTO: 3.4 K/UL (ref 1.8–7.7)
NEUTROPHILS NFR BLD: 51.7 % (ref 38–73)
NRBC BLD-RTO: 0 /100 WBC
PLATELET # BLD AUTO: 219 K/UL (ref 150–450)
PMV BLD AUTO: 10.5 FL (ref 9.2–12.9)
POTASSIUM SERPL-SCNC: 4.4 MMOL/L (ref 3.5–5.1)
RBC # BLD AUTO: 5.42 M/UL (ref 4–5.4)
SARS-COV-2 RDRP RESP QL NAA+PROBE: NEGATIVE
SODIUM SERPL-SCNC: 141 MMOL/L (ref 136–145)
WBC # BLD AUTO: 6.52 K/UL (ref 3.9–12.7)

## 2021-05-17 PROCEDURE — 85025 COMPLETE CBC W/AUTO DIFF WBC: CPT | Performed by: INTERNAL MEDICINE

## 2021-05-17 PROCEDURE — 36415 COLL VENOUS BLD VENIPUNCTURE: CPT | Performed by: INTERNAL MEDICINE

## 2021-05-17 PROCEDURE — 80048 BASIC METABOLIC PNL TOTAL CA: CPT | Performed by: INTERNAL MEDICINE

## 2021-05-17 PROCEDURE — U0002 COVID-19 LAB TEST NON-CDC: HCPCS | Performed by: INTERNAL MEDICINE

## 2021-05-17 RX ORDER — IBUPROFEN 200 MG
200 TABLET ORAL EVERY 6 HOURS PRN
COMMUNITY

## 2021-05-17 RX ORDER — ASPIRIN 81 MG/1
81 TABLET ORAL DAILY
COMMUNITY

## 2021-05-19 ENCOUNTER — HOSPITAL ENCOUNTER (OUTPATIENT)
Facility: HOSPITAL | Age: 56
Discharge: HOME | End: 2021-05-19
Attending: INTERNAL MEDICINE | Admitting: INTERNAL MEDICINE
Payer: COMMERCIAL

## 2021-05-19 VITALS
TEMPERATURE: 98 F | DIASTOLIC BLOOD PRESSURE: 78 MMHG | WEIGHT: 174.19 LBS | SYSTOLIC BLOOD PRESSURE: 133 MMHG | BODY MASS INDEX: 30.85 KG/M2 | RESPIRATION RATE: 20 BRPM | HEART RATE: 70 BPM | OXYGEN SATURATION: 97 %

## 2021-05-19 DIAGNOSIS — G43.829 MENSTRUAL MIGRAINE WITHOUT STATUS MIGRAINOSUS, NOT INTRACTABLE: ICD-10-CM

## 2021-05-19 DIAGNOSIS — R07.9 CHEST PAIN, UNSPECIFIED TYPE: ICD-10-CM

## 2021-05-19 DIAGNOSIS — Z11.52 ENCOUNTER FOR PREOPERATIVE SCREENING LABORATORY TESTING FOR COVID-19 VIRUS: Primary | ICD-10-CM

## 2021-05-19 DIAGNOSIS — I10 ESSENTIAL HYPERTENSION: ICD-10-CM

## 2021-05-19 DIAGNOSIS — R94.39 ABNORMAL STRESS TEST: ICD-10-CM

## 2021-05-19 DIAGNOSIS — Z17.0 MALIGNANT NEOPLASM OF RIGHT BREAST IN FEMALE, ESTROGEN RECEPTOR POSITIVE, UNSPECIFIED SITE OF BREAST: ICD-10-CM

## 2021-05-19 DIAGNOSIS — F39 MOOD DISORDER: ICD-10-CM

## 2021-05-19 DIAGNOSIS — K57.20 DIVERTICULITIS OF LARGE INTESTINE WITH ABSCESS WITHOUT BLEEDING: ICD-10-CM

## 2021-05-19 DIAGNOSIS — Z01.812 ENCOUNTER FOR PREOPERATIVE SCREENING LABORATORY TESTING FOR COVID-19 VIRUS: Primary | ICD-10-CM

## 2021-05-19 DIAGNOSIS — F41.9 ANXIETY: ICD-10-CM

## 2021-05-19 DIAGNOSIS — Z87.2 HISTORY OF PILONIDAL CYST: ICD-10-CM

## 2021-05-19 DIAGNOSIS — C50.911 MALIGNANT NEOPLASM OF RIGHT BREAST IN FEMALE, ESTROGEN RECEPTOR POSITIVE, UNSPECIFIED SITE OF BREAST: ICD-10-CM

## 2021-05-19 DIAGNOSIS — N20.0 NEPHROLITHIASIS: ICD-10-CM

## 2021-05-19 DIAGNOSIS — M79.7 FIBROMYALGIA: ICD-10-CM

## 2021-05-19 PROCEDURE — 99152 PR MOD CONSCIOUS SEDATION, SAME PHYS, 5+ YRS, FIRST 15 MIN: ICD-10-PCS | Mod: ,,, | Performed by: INTERNAL MEDICINE

## 2021-05-19 PROCEDURE — 99152 MOD SED SAME PHYS/QHP 5/>YRS: CPT | Mod: ,,, | Performed by: INTERNAL MEDICINE

## 2021-05-19 PROCEDURE — 93458 L HRT ARTERY/VENTRICLE ANGIO: CPT | Mod: 26,,, | Performed by: INTERNAL MEDICINE

## 2021-05-19 PROCEDURE — 93458 PR CATH PLACE/CORON ANGIO, IMG SUPER/INTERP,W LEFT HEART VENTRICULOGRAPHY: ICD-10-PCS | Mod: 26,,, | Performed by: INTERNAL MEDICINE

## 2021-05-19 PROCEDURE — 25000003 PHARM REV CODE 250: Performed by: INTERNAL MEDICINE

## 2021-05-19 PROCEDURE — 99152 MOD SED SAME PHYS/QHP 5/>YRS: CPT | Performed by: INTERNAL MEDICINE

## 2021-05-19 PROCEDURE — C1894 INTRO/SHEATH, NON-LASER: HCPCS | Performed by: INTERNAL MEDICINE

## 2021-05-19 PROCEDURE — C1769 GUIDE WIRE: HCPCS | Performed by: INTERNAL MEDICINE

## 2021-05-19 PROCEDURE — C1887 CATHETER, GUIDING: HCPCS | Performed by: INTERNAL MEDICINE

## 2021-05-19 PROCEDURE — 25500020 PHARM REV CODE 255: Performed by: INTERNAL MEDICINE

## 2021-05-19 PROCEDURE — 63600175 PHARM REV CODE 636 W HCPCS: Performed by: INTERNAL MEDICINE

## 2021-05-19 PROCEDURE — 93458 L HRT ARTERY/VENTRICLE ANGIO: CPT | Performed by: INTERNAL MEDICINE

## 2021-05-19 RX ORDER — LIDOCAINE HYDROCHLORIDE 10 MG/ML
INJECTION, SOLUTION EPIDURAL; INFILTRATION; INTRACAUDAL; PERINEURAL
Status: DISCONTINUED | OUTPATIENT
Start: 2021-05-19 | End: 2021-05-19 | Stop reason: HOSPADM

## 2021-05-19 RX ORDER — SODIUM CHLORIDE 9 MG/ML
INJECTION, SOLUTION INTRAVENOUS CONTINUOUS
Status: DISCONTINUED | OUTPATIENT
Start: 2021-05-19 | End: 2021-05-19 | Stop reason: HOSPADM

## 2021-05-19 RX ORDER — VERAPAMIL HYDROCHLORIDE 2.5 MG/ML
INJECTION, SOLUTION INTRAVENOUS
Status: DISCONTINUED | OUTPATIENT
Start: 2021-05-19 | End: 2021-05-19 | Stop reason: HOSPADM

## 2021-05-19 RX ORDER — HEPARIN SODIUM 1000 [USP'U]/ML
INJECTION, SOLUTION INTRAVENOUS; SUBCUTANEOUS
Status: DISCONTINUED | OUTPATIENT
Start: 2021-05-19 | End: 2021-05-19 | Stop reason: HOSPADM

## 2021-05-19 RX ORDER — NITROGLYCERIN 20 MG/100ML
INJECTION INTRAVENOUS
Status: DISCONTINUED | OUTPATIENT
Start: 2021-05-19 | End: 2021-05-19 | Stop reason: HOSPADM

## 2021-05-19 RX ORDER — ONDANSETRON 2 MG/ML
4 INJECTION INTRAMUSCULAR; INTRAVENOUS EVERY 12 HOURS PRN
Status: DISCONTINUED | OUTPATIENT
Start: 2021-05-19 | End: 2021-05-19 | Stop reason: HOSPADM

## 2021-05-19 RX ORDER — ACETAMINOPHEN 325 MG/1
650 TABLET ORAL EVERY 4 HOURS PRN
Status: DISCONTINUED | OUTPATIENT
Start: 2021-05-19 | End: 2021-05-19 | Stop reason: HOSPADM

## 2021-05-19 RX ORDER — MIDAZOLAM HYDROCHLORIDE 1 MG/ML
INJECTION, SOLUTION INTRAMUSCULAR; INTRAVENOUS
Status: DISCONTINUED | OUTPATIENT
Start: 2021-05-19 | End: 2021-05-19 | Stop reason: HOSPADM

## 2021-05-19 RX ORDER — DIPHENHYDRAMINE HCL 25 MG
50 CAPSULE ORAL ONCE
Status: COMPLETED | OUTPATIENT
Start: 2021-05-19 | End: 2021-05-19

## 2021-05-19 RX ORDER — FENTANYL CITRATE 50 UG/ML
INJECTION, SOLUTION INTRAMUSCULAR; INTRAVENOUS
Status: DISCONTINUED | OUTPATIENT
Start: 2021-05-19 | End: 2021-05-19 | Stop reason: HOSPADM

## 2021-05-19 RX ORDER — CLOPIDOGREL 300 MG/1
600 TABLET, FILM COATED ORAL ONCE
Status: COMPLETED | OUTPATIENT
Start: 2021-05-19 | End: 2021-05-19

## 2021-05-19 RX ORDER — ATORVASTATIN CALCIUM 40 MG/1
40 TABLET, FILM COATED ORAL NIGHTLY
Qty: 90 TABLET | Refills: 3 | Status: SHIPPED | OUTPATIENT
Start: 2021-05-19 | End: 2021-09-21 | Stop reason: SDUPTHER

## 2021-05-19 RX ADMIN — SODIUM CHLORIDE: 0.9 INJECTION, SOLUTION INTRAVENOUS at 10:05

## 2021-05-19 RX ADMIN — DIPHENHYDRAMINE HYDROCHLORIDE 50 MG: 25 CAPSULE ORAL at 11:05

## 2021-05-19 RX ADMIN — CLOPIDOGREL BISULFATE 600 MG: 300 TABLET, FILM COATED ORAL at 11:05

## 2021-05-27 ENCOUNTER — PATIENT OUTREACH (OUTPATIENT)
Dept: ADMINISTRATIVE | Facility: HOSPITAL | Age: 56
End: 2021-05-27

## 2021-06-07 ENCOUNTER — OFFICE VISIT (OUTPATIENT)
Dept: CARDIOLOGY | Facility: CLINIC | Age: 56
End: 2021-06-07
Payer: COMMERCIAL

## 2021-06-07 ENCOUNTER — PATIENT OUTREACH (OUTPATIENT)
Dept: ADMINISTRATIVE | Facility: OTHER | Age: 56
End: 2021-06-07

## 2021-06-07 VITALS
OXYGEN SATURATION: 96 % | DIASTOLIC BLOOD PRESSURE: 73 MMHG | HEIGHT: 63 IN | HEART RATE: 70 BPM | BODY MASS INDEX: 31.68 KG/M2 | WEIGHT: 178.81 LBS | SYSTOLIC BLOOD PRESSURE: 133 MMHG

## 2021-06-07 DIAGNOSIS — R07.9 CHEST PAIN, UNSPECIFIED TYPE: ICD-10-CM

## 2021-06-07 DIAGNOSIS — R94.39 ABNORMAL STRESS TEST: ICD-10-CM

## 2021-06-07 DIAGNOSIS — F41.9 ANXIETY: ICD-10-CM

## 2021-06-07 DIAGNOSIS — M79.7 FIBROMYALGIA: ICD-10-CM

## 2021-06-07 DIAGNOSIS — I10 ESSENTIAL HYPERTENSION: Primary | ICD-10-CM

## 2021-06-07 PROCEDURE — 3078F PR MOST RECENT DIASTOLIC BLOOD PRESSURE < 80 MM HG: ICD-10-PCS | Mod: CPTII,S$GLB,, | Performed by: INTERNAL MEDICINE

## 2021-06-07 PROCEDURE — 1126F AMNT PAIN NOTED NONE PRSNT: CPT | Mod: S$GLB,,, | Performed by: INTERNAL MEDICINE

## 2021-06-07 PROCEDURE — 3008F BODY MASS INDEX DOCD: CPT | Mod: CPTII,S$GLB,, | Performed by: INTERNAL MEDICINE

## 2021-06-07 PROCEDURE — 1126F PR PAIN SEVERITY QUANTIFIED, NO PAIN PRESENT: ICD-10-PCS | Mod: S$GLB,,, | Performed by: INTERNAL MEDICINE

## 2021-06-07 PROCEDURE — 3078F DIAST BP <80 MM HG: CPT | Mod: CPTII,S$GLB,, | Performed by: INTERNAL MEDICINE

## 2021-06-07 PROCEDURE — 99214 PR OFFICE/OUTPT VISIT, EST, LEVL IV, 30-39 MIN: ICD-10-PCS | Mod: S$GLB,,, | Performed by: INTERNAL MEDICINE

## 2021-06-07 PROCEDURE — 99999 PR PBB SHADOW E&M-EST. PATIENT-LVL III: CPT | Mod: PBBFAC,,, | Performed by: INTERNAL MEDICINE

## 2021-06-07 PROCEDURE — 99214 OFFICE O/P EST MOD 30 MIN: CPT | Mod: S$GLB,,, | Performed by: INTERNAL MEDICINE

## 2021-06-07 PROCEDURE — 3075F PR MOST RECENT SYSTOLIC BLOOD PRESS GE 130-139MM HG: ICD-10-PCS | Mod: CPTII,S$GLB,, | Performed by: INTERNAL MEDICINE

## 2021-06-07 PROCEDURE — 99999 PR PBB SHADOW E&M-EST. PATIENT-LVL III: ICD-10-PCS | Mod: PBBFAC,,, | Performed by: INTERNAL MEDICINE

## 2021-06-07 PROCEDURE — 3008F PR BODY MASS INDEX (BMI) DOCUMENTED: ICD-10-PCS | Mod: CPTII,S$GLB,, | Performed by: INTERNAL MEDICINE

## 2021-06-07 PROCEDURE — 3075F SYST BP GE 130 - 139MM HG: CPT | Mod: CPTII,S$GLB,, | Performed by: INTERNAL MEDICINE

## 2021-06-10 ENCOUNTER — OFFICE VISIT (OUTPATIENT)
Dept: FAMILY MEDICINE | Facility: CLINIC | Age: 56
End: 2021-06-10
Payer: COMMERCIAL

## 2021-06-10 VITALS
HEART RATE: 68 BPM | WEIGHT: 176.38 LBS | OXYGEN SATURATION: 98 % | TEMPERATURE: 98 F | HEIGHT: 64 IN | BODY MASS INDEX: 30.11 KG/M2 | SYSTOLIC BLOOD PRESSURE: 134 MMHG | DIASTOLIC BLOOD PRESSURE: 76 MMHG

## 2021-06-10 DIAGNOSIS — I10 ESSENTIAL HYPERTENSION: Chronic | ICD-10-CM

## 2021-06-10 DIAGNOSIS — F41.9 ANXIETY: ICD-10-CM

## 2021-06-10 DIAGNOSIS — G43.829 MENSTRUAL MIGRAINE WITHOUT STATUS MIGRAINOSUS, NOT INTRACTABLE: ICD-10-CM

## 2021-06-10 DIAGNOSIS — H53.9 VISION DISTURBANCE: Primary | ICD-10-CM

## 2021-06-10 DIAGNOSIS — D48.9 NEOPLASM, UNCERTAIN WHETHER BENIGN OR MALIGNANT: ICD-10-CM

## 2021-06-10 DIAGNOSIS — F39 MOOD DISORDER: ICD-10-CM

## 2021-06-10 DIAGNOSIS — R94.39 ABNORMAL STRESS TEST: ICD-10-CM

## 2021-06-10 PROBLEM — K57.80 DIVERTICULITIS OF INTESTINE WITH ABSCESS WITHOUT BLEEDING: Status: RESOLVED | Noted: 2020-09-09 | Resolved: 2021-06-10

## 2021-06-10 PROBLEM — N20.0 NEPHROLITHIASIS: Status: RESOLVED | Noted: 2018-09-27 | Resolved: 2021-06-10

## 2021-06-10 PROCEDURE — 3008F BODY MASS INDEX DOCD: CPT | Mod: CPTII,S$GLB,, | Performed by: FAMILY MEDICINE

## 2021-06-10 PROCEDURE — 1125F PR PAIN SEVERITY QUANTIFIED, PAIN PRESENT: ICD-10-PCS | Mod: S$GLB,,, | Performed by: FAMILY MEDICINE

## 2021-06-10 PROCEDURE — 3008F PR BODY MASS INDEX (BMI) DOCUMENTED: ICD-10-PCS | Mod: CPTII,S$GLB,, | Performed by: FAMILY MEDICINE

## 2021-06-10 PROCEDURE — 99214 PR OFFICE/OUTPT VISIT, EST, LEVL IV, 30-39 MIN: ICD-10-PCS | Mod: S$GLB,,, | Performed by: FAMILY MEDICINE

## 2021-06-10 PROCEDURE — 99999 PR PBB SHADOW E&M-EST. PATIENT-LVL IV: ICD-10-PCS | Mod: PBBFAC,,, | Performed by: FAMILY MEDICINE

## 2021-06-10 PROCEDURE — 99214 OFFICE O/P EST MOD 30 MIN: CPT | Mod: S$GLB,,, | Performed by: FAMILY MEDICINE

## 2021-06-10 PROCEDURE — 99999 PR PBB SHADOW E&M-EST. PATIENT-LVL IV: CPT | Mod: PBBFAC,,, | Performed by: FAMILY MEDICINE

## 2021-06-10 PROCEDURE — 1125F AMNT PAIN NOTED PAIN PRSNT: CPT | Mod: S$GLB,,, | Performed by: FAMILY MEDICINE

## 2021-06-11 ENCOUNTER — TELEPHONE (OUTPATIENT)
Dept: FAMILY MEDICINE | Facility: CLINIC | Age: 56
End: 2021-06-11

## 2021-07-27 ENCOUNTER — PATIENT OUTREACH (OUTPATIENT)
Dept: ADMINISTRATIVE | Facility: OTHER | Age: 56
End: 2021-07-27

## 2021-07-29 ENCOUNTER — OFFICE VISIT (OUTPATIENT)
Dept: DERMATOLOGY | Facility: CLINIC | Age: 56
End: 2021-07-29
Payer: COMMERCIAL

## 2021-07-29 VITALS — WEIGHT: 176 LBS | BODY MASS INDEX: 30.21 KG/M2

## 2021-07-29 DIAGNOSIS — D48.9 NEOPLASM, UNCERTAIN WHETHER BENIGN OR MALIGNANT: ICD-10-CM

## 2021-07-29 DIAGNOSIS — Z12.83 SKIN EXAM, SCREENING FOR CANCER: ICD-10-CM

## 2021-07-29 DIAGNOSIS — L57.8 ACTINIC DERMATITIS: Primary | ICD-10-CM

## 2021-07-29 DIAGNOSIS — L81.4 LENTIGINES: ICD-10-CM

## 2021-07-29 DIAGNOSIS — L82.1 SEBORRHEIC KERATOSES: ICD-10-CM

## 2021-07-29 DIAGNOSIS — D18.01 CHERRY ANGIOMA: ICD-10-CM

## 2021-07-29 PROCEDURE — 3008F BODY MASS INDEX DOCD: CPT | Mod: CPTII,S$GLB,, | Performed by: DERMATOLOGY

## 2021-07-29 PROCEDURE — 99203 OFFICE O/P NEW LOW 30 MIN: CPT | Mod: 25,S$GLB,, | Performed by: DERMATOLOGY

## 2021-07-29 PROCEDURE — 1126F PR PAIN SEVERITY QUANTIFIED, NO PAIN PRESENT: ICD-10-PCS | Mod: CPTII,S$GLB,, | Performed by: DERMATOLOGY

## 2021-07-29 PROCEDURE — 99203 PR OFFICE/OUTPT VISIT, NEW, LEVL III, 30-44 MIN: ICD-10-PCS | Mod: 25,S$GLB,, | Performed by: DERMATOLOGY

## 2021-07-29 PROCEDURE — 1159F PR MEDICATION LIST DOCUMENTED IN MEDICAL RECORD: ICD-10-PCS | Mod: CPTII,S$GLB,, | Performed by: DERMATOLOGY

## 2021-07-29 PROCEDURE — 17110 PR DESTRUCTION BENIGN LESIONS UP TO 14: ICD-10-PCS | Mod: S$GLB,,, | Performed by: DERMATOLOGY

## 2021-07-29 PROCEDURE — 1160F RVW MEDS BY RX/DR IN RCRD: CPT | Mod: CPTII,S$GLB,, | Performed by: DERMATOLOGY

## 2021-07-29 PROCEDURE — 99999 PR PBB SHADOW E&M-EST. PATIENT-LVL III: ICD-10-PCS | Mod: PBBFAC,,, | Performed by: DERMATOLOGY

## 2021-07-29 PROCEDURE — 3008F PR BODY MASS INDEX (BMI) DOCUMENTED: ICD-10-PCS | Mod: CPTII,S$GLB,, | Performed by: DERMATOLOGY

## 2021-07-29 PROCEDURE — 17110 DESTRUCTION B9 LES UP TO 14: CPT | Mod: S$GLB,,, | Performed by: DERMATOLOGY

## 2021-07-29 PROCEDURE — 1159F MED LIST DOCD IN RCRD: CPT | Mod: CPTII,S$GLB,, | Performed by: DERMATOLOGY

## 2021-07-29 PROCEDURE — 1126F AMNT PAIN NOTED NONE PRSNT: CPT | Mod: CPTII,S$GLB,, | Performed by: DERMATOLOGY

## 2021-07-29 PROCEDURE — 1160F PR REVIEW ALL MEDS BY PRESCRIBER/CLIN PHARMACIST DOCUMENTED: ICD-10-PCS | Mod: CPTII,S$GLB,, | Performed by: DERMATOLOGY

## 2021-07-29 PROCEDURE — 99999 PR PBB SHADOW E&M-EST. PATIENT-LVL III: CPT | Mod: PBBFAC,,, | Performed by: DERMATOLOGY

## 2021-07-29 RX ORDER — FLUOROURACIL 50 MG/G
CREAM TOPICAL
Qty: 40 G | Refills: 3 | Status: SHIPPED | OUTPATIENT
Start: 2021-07-29

## 2021-08-23 NOTE — PROGRESS NOTES
Subjective:       Patient ID: Vane Seo is a 54 y.o. female with multiple medical diagnoses as listed in the medical history and problem list that presents for Abdominal Pain (left since friday )  .    Chief Complaint: Abdominal Pain (left since friday )      Abdominal Pain   This is a new problem. The current episode started in the past 7 days. The problem has been gradually improving. The pain is located in the LLQ. The pain is at a severity of 2/10 (it was a 7/8 fri-sun). The quality of the pain is sharp and cramping (bloated ). The abdominal pain does not radiate. Associated symptoms include constipation (take OTC stool softner had BMs saturday and sunday ). Pertinent negatives include no anorexia, belching, diarrhea, dysuria, fever, flatus, frequency, hematochezia, hematuria or nausea. Associated symptoms comments: GERD last night . Treatments tried: OTC stool softner         Review of Systems   Constitutional: Negative for chills, fatigue and fever.   Gastrointestinal: Positive for abdominal pain and constipation (take OTC stool softner had BMs saturday and sunday ). Negative for anorexia, diarrhea, flatus, hematochezia, nausea and rectal pain.   Genitourinary: Negative for dysuria, frequency and hematuria.        Darker urine           PAST MEDICAL HISTORY:  Past Medical History:   Diagnosis Date    Cancer     Breast cancer  4 years ago.     Fibromyalgia     Hypertension        SOCIAL HISTORY:  Social History     Socioeconomic History    Marital status:      Spouse name: Not on file    Number of children: Not on file    Years of education: Not on file    Highest education level: Not on file   Occupational History    Not on file   Social Needs    Financial resource strain: Not on file    Food insecurity:     Worry: Not on file     Inability: Not on file    Transportation needs:     Medical: Not on file     Non-medical: Not on file   Tobacco Use    Smoking status: Former Smoker     Smokeless tobacco: Never Used    Tobacco comment: 1993   Substance and Sexual Activity    Alcohol use: No    Drug use: No    Sexual activity: Yes     Partners: Male   Lifestyle    Physical activity:     Days per week: Not on file     Minutes per session: Not on file    Stress: Not on file   Relationships    Social connections:     Talks on phone: Not on file     Gets together: Not on file     Attends [employer] service: Not on file     Active member of club or organization: Not on file     Attends meetings of clubs or organizations: Not on file     Relationship status: Not on file   Other Topics Concern    Not on file   Social History Narrative    Not on file       ALLERGIES AND MEDICATIONS: updated and reviewed.  Review of patient's allergies indicates:   Allergen Reactions    Erythromycin Other (See Comments)     convulsions    Cetylpyridinium-benzocaine      Current Outpatient Medications   Medication Sig Dispense Refill    ALPRAZolam (XANAX) 2 MG Tab Take 1 tablet (2 mg total) by mouth nightly as needed. 25 tablet 1    busPIRone (BUSPAR) 15 MG tablet Take 1 tablet (15 mg total) by mouth 2 (two) times daily. 180 tablet 1    butalbital-acetaminophen-caffeine -40 mg (FIORICET, ESGIC) -40 mg per tablet Take 1 tablet by mouth 2 (two) times daily as needed for Headaches. 60 tablet 0    fluticasone (FLONASE) 50 mcg/actuation nasal spray 1 spray (50 mcg total) by Each Nare route 2 (two) times daily. 16 g 5    metoprolol tartrate (LOPRESSOR) 25 MG tablet TAKE 1 TABLET BY MOUTH TWICE DAILY (DISCONTINUE  ATENOLOL) 180 tablet 3    pregabalin (LYRICA) 150 MG capsule Take 1 capsule (150 mg total) by mouth 2 (two) times daily. 60 capsule 5    traMADol (ULTRAM) 50 mg tablet TAKE 1 TABLET BY MOUTH EVERY 4 HOURS DURING  THE  DAYS  AS  NEEDED  FOR  PAIN  AND  2  TABLETS  AT  NIGHT  AS  NEEDED  FOR  PAIN 150 tablet 5    nitrofurantoin, macrocrystal-monohydrate, (MACROBID) 100 MG capsule Take 1 capsule  (100 mg total) by mouth 2 (two) times daily. for 3 days 6 capsule 0    tamsulosin (FLOMAX) 0.4 mg Cap Take 1 capsule (0.4 mg total) by mouth once daily. (Patient not taking: Reported on 11/4/2019) 20 capsule 0     No current facility-administered medications for this visit.          Objective:   /80   Pulse 73   Temp 98.1 °F (36.7 °C) (Oral)   Resp 16   Wt 80.7 kg (177 lb 14.6 oz)   SpO2 96%   BMI 30.54 kg/m²      Physical Exam   Constitutional: She is oriented to person, place, and time. No distress.   HENT:   Head: Normocephalic and atraumatic.   Eyes: Conjunctivae and EOM are normal.   Cardiovascular: Normal rate and regular rhythm.   Pulmonary/Chest: Effort normal and breath sounds normal.   Abdominal: Soft. Normal appearance. Bowel sounds are increased. There is tenderness in the left lower quadrant. No hernia.       Neurological: She is alert and oriented to person, place, and time.   Skin: Skin is warm. No rash noted. No erythema.           Assessment:       1. LLQ pain    2. Acute cystitis with hematuria        Plan:       LLQ pain  -     POCT urinalysis, dipstick or tablet reag  -     Urine culture; Future; Expected date: 12/04/2019  -     Urinalysis; Future; Expected date: 12/04/2019    Acute cystitis with hematuria  -     nitrofurantoin, macrocrystal-monohydrate, (MACROBID) 100 MG capsule; Take 1 capsule (100 mg total) by mouth 2 (two) times daily. for 3 days  Dispense: 6 capsule; Refill: 0    -will contact with results   Likely pain was from constipation as he has improved dramatically.  If persist post UTI treatment, further work up.         No follow-ups on file.   Female

## 2021-09-07 RX ORDER — ESCITALOPRAM OXALATE 10 MG/1
10 TABLET ORAL DAILY
Qty: 90 TABLET | Refills: 3 | Status: SHIPPED | OUTPATIENT
Start: 2021-09-07 | End: 2021-09-18

## 2021-09-16 ENCOUNTER — TELEPHONE (OUTPATIENT)
Dept: FAMILY MEDICINE | Facility: CLINIC | Age: 56
End: 2021-09-16

## 2021-09-21 ENCOUNTER — OFFICE VISIT (OUTPATIENT)
Dept: FAMILY MEDICINE | Facility: CLINIC | Age: 56
End: 2021-09-21
Payer: COMMERCIAL

## 2021-09-21 DIAGNOSIS — M79.7 FIBROMYALGIA: ICD-10-CM

## 2021-09-21 DIAGNOSIS — I10 ESSENTIAL HYPERTENSION: ICD-10-CM

## 2021-09-21 DIAGNOSIS — F41.9 ANXIETY: ICD-10-CM

## 2021-09-21 PROCEDURE — 1160F RVW MEDS BY RX/DR IN RCRD: CPT | Mod: CPTII,95,, | Performed by: FAMILY MEDICINE

## 2021-09-21 PROCEDURE — 99214 PR OFFICE/OUTPT VISIT, EST, LEVL IV, 30-39 MIN: ICD-10-PCS | Mod: 95,,, | Performed by: FAMILY MEDICINE

## 2021-09-21 PROCEDURE — 1159F MED LIST DOCD IN RCRD: CPT | Mod: CPTII,95,, | Performed by: FAMILY MEDICINE

## 2021-09-21 PROCEDURE — 1159F PR MEDICATION LIST DOCUMENTED IN MEDICAL RECORD: ICD-10-PCS | Mod: CPTII,95,, | Performed by: FAMILY MEDICINE

## 2021-09-21 PROCEDURE — 1160F PR REVIEW ALL MEDS BY PRESCRIBER/CLIN PHARMACIST DOCUMENTED: ICD-10-PCS | Mod: CPTII,95,, | Performed by: FAMILY MEDICINE

## 2021-09-21 PROCEDURE — 99214 OFFICE O/P EST MOD 30 MIN: CPT | Mod: 95,,, | Performed by: FAMILY MEDICINE

## 2021-09-21 RX ORDER — TRAMADOL HYDROCHLORIDE 50 MG/1
TABLET ORAL
Qty: 150 TABLET | Refills: 3 | Status: SHIPPED | OUTPATIENT
Start: 2021-09-21 | End: 2022-04-20

## 2021-09-21 RX ORDER — ATORVASTATIN CALCIUM 40 MG/1
40 TABLET, FILM COATED ORAL NIGHTLY
Qty: 90 TABLET | Refills: 3 | Status: SHIPPED | OUTPATIENT
Start: 2021-09-21 | End: 2023-05-04 | Stop reason: SDUPTHER

## 2021-09-21 RX ORDER — ESCITALOPRAM OXALATE 10 MG/1
10 TABLET ORAL DAILY
Qty: 90 TABLET | Refills: 3 | Status: SHIPPED | OUTPATIENT
Start: 2021-09-21 | End: 2022-04-20

## 2021-09-21 RX ORDER — METOPROLOL TARTRATE 25 MG/1
TABLET, FILM COATED ORAL
Qty: 180 TABLET | Refills: 3 | Status: SHIPPED | OUTPATIENT
Start: 2021-09-21 | End: 2022-04-20

## 2021-09-21 RX ORDER — ALPRAZOLAM 2 MG/1
2 TABLET ORAL NIGHTLY PRN
Qty: 25 TABLET | Refills: 1 | Status: SHIPPED | OUTPATIENT
Start: 2021-09-21 | End: 2022-02-15

## 2021-09-21 RX ORDER — NALOXONE HYDROCHLORIDE 4 MG/.1ML
SPRAY NASAL
Qty: 1 EACH | Refills: 11 | Status: SHIPPED | OUTPATIENT
Start: 2021-09-21

## 2021-10-14 ENCOUNTER — OFFICE VISIT (OUTPATIENT)
Dept: CARDIOLOGY | Facility: CLINIC | Age: 56
End: 2021-10-14
Payer: COMMERCIAL

## 2021-10-14 VITALS
WEIGHT: 172 LBS | BODY MASS INDEX: 29.37 KG/M2 | SYSTOLIC BLOOD PRESSURE: 114 MMHG | HEIGHT: 64 IN | DIASTOLIC BLOOD PRESSURE: 70 MMHG

## 2021-10-14 DIAGNOSIS — F41.9 ANXIETY: ICD-10-CM

## 2021-10-14 DIAGNOSIS — I10 ESSENTIAL HYPERTENSION: Primary | ICD-10-CM

## 2021-10-14 DIAGNOSIS — M79.7 FIBROMYALGIA: ICD-10-CM

## 2021-10-14 DIAGNOSIS — R94.39 ABNORMAL STRESS TEST: ICD-10-CM

## 2021-10-14 PROCEDURE — 1160F PR REVIEW ALL MEDS BY PRESCRIBER/CLIN PHARMACIST DOCUMENTED: ICD-10-PCS | Mod: CPTII,S$GLB,, | Performed by: INTERNAL MEDICINE

## 2021-10-14 PROCEDURE — 3074F PR MOST RECENT SYSTOLIC BLOOD PRESSURE < 130 MM HG: ICD-10-PCS | Mod: CPTII,S$GLB,, | Performed by: INTERNAL MEDICINE

## 2021-10-14 PROCEDURE — 1159F PR MEDICATION LIST DOCUMENTED IN MEDICAL RECORD: ICD-10-PCS | Mod: CPTII,S$GLB,, | Performed by: INTERNAL MEDICINE

## 2021-10-14 PROCEDURE — 3008F BODY MASS INDEX DOCD: CPT | Mod: CPTII,S$GLB,, | Performed by: INTERNAL MEDICINE

## 2021-10-14 PROCEDURE — 3074F SYST BP LT 130 MM HG: CPT | Mod: CPTII,S$GLB,, | Performed by: INTERNAL MEDICINE

## 2021-10-14 PROCEDURE — 99214 OFFICE O/P EST MOD 30 MIN: CPT | Mod: S$GLB,,, | Performed by: INTERNAL MEDICINE

## 2021-10-14 PROCEDURE — 99999 PR PBB SHADOW E&M-EST. PATIENT-LVL III: CPT | Mod: PBBFAC,,, | Performed by: INTERNAL MEDICINE

## 2021-10-14 PROCEDURE — 99999 PR PBB SHADOW E&M-EST. PATIENT-LVL III: ICD-10-PCS | Mod: PBBFAC,,, | Performed by: INTERNAL MEDICINE

## 2021-10-14 PROCEDURE — 3078F DIAST BP <80 MM HG: CPT | Mod: CPTII,S$GLB,, | Performed by: INTERNAL MEDICINE

## 2021-10-14 PROCEDURE — 1159F MED LIST DOCD IN RCRD: CPT | Mod: CPTII,S$GLB,, | Performed by: INTERNAL MEDICINE

## 2021-10-14 PROCEDURE — 99214 PR OFFICE/OUTPT VISIT, EST, LEVL IV, 30-39 MIN: ICD-10-PCS | Mod: S$GLB,,, | Performed by: INTERNAL MEDICINE

## 2021-10-14 PROCEDURE — 3008F PR BODY MASS INDEX (BMI) DOCUMENTED: ICD-10-PCS | Mod: CPTII,S$GLB,, | Performed by: INTERNAL MEDICINE

## 2021-10-14 PROCEDURE — 1160F RVW MEDS BY RX/DR IN RCRD: CPT | Mod: CPTII,S$GLB,, | Performed by: INTERNAL MEDICINE

## 2021-10-14 PROCEDURE — 3078F PR MOST RECENT DIASTOLIC BLOOD PRESSURE < 80 MM HG: ICD-10-PCS | Mod: CPTII,S$GLB,, | Performed by: INTERNAL MEDICINE

## 2021-10-14 RX ORDER — NITROGLYCERIN 0.4 MG/1
0.4 TABLET SUBLINGUAL EVERY 5 MIN PRN
Qty: 90 TABLET | Refills: 3 | Status: SHIPPED | OUTPATIENT
Start: 2021-10-14 | End: 2023-05-04 | Stop reason: SDUPTHER

## 2022-02-14 DIAGNOSIS — F41.9 ANXIETY: ICD-10-CM

## 2022-02-14 NOTE — TELEPHONE ENCOUNTER
Care Due:                  Date            Visit Type   Department     Provider  --------------------------------------------------------------------------------                                ESTABLISHED   [facility]                              PATIENT -    MEDICINE/INTERN  Last Visit: 09-      [facility]         Dino Mcgraw  Next Visit: None Scheduled  None         None Found                                                            Last  Test          Frequency    Reason                     Performed    Due Date  --------------------------------------------------------------------------------    CMP.........  12 months..  atorvastatin.............  05- 04-    Lipid Panel.  12 months..  atorvastatin.............  05- 05-    Powered by [employer]. Reference number: [id number].   2/14/2022 2:09:50 PM CST

## 2022-02-14 NOTE — TELEPHONE ENCOUNTER
Last Office Visit Info:   The patient's last visit with Dino Mcgraw MD was on 9/21/2021.    The patient's last visit in current department was on 9/21/2021.        Last CBC Results:   Lab Results   Component Value Date    WBC 6.52 05/17/2021    HGB 14.6 05/17/2021    HCT 44.8 05/17/2021     05/17/2021       Last CMP Results  Lab Results   Component Value Date     05/17/2021    K 4.4 05/17/2021     05/17/2021    CO2 27 05/17/2021    BUN 16 05/17/2021    CREATININE 0.8 05/17/2021    CALCIUM 9.3 05/17/2021    ALBUMIN 4.1 05/05/2021    AST 25 05/05/2021    ALT 35 05/05/2021       Last Lipids  Lab Results   Component Value Date    CHOL 176 05/06/2021    TRIG 76 05/06/2021    HDL 55 05/06/2021    LDLCALC 105.8 05/06/2021       Last A1C  Lab Results   Component Value Date    HGBA1C 5.3 11/01/2019       Last TSH  Lab Results   Component Value Date    TSH 1.265 05/05/2021             Current Med Refills  Medication List with Changes/Refills   Current Medications    ALPRAZOLAM (XANAX) 2 MG TAB    Take 1 tablet (2 mg total) by mouth nightly as needed (insomnia/anxiety).       Start Date: 9/21/2021 End Date: 10/21/2021    ASPIRIN (ECOTRIN) 81 MG EC TABLET    Take 81 mg by mouth once daily.       Start Date: --        End Date: --    ATORVASTATIN (LIPITOR) 40 MG TABLET    Take 1 tablet (40 mg total) by mouth every evening.       Start Date: 9/21/2021 End Date: --    ESCITALOPRAM OXALATE (LEXAPRO) 10 MG TABLET    Take 1 tablet (10 mg total) by mouth once daily.       Start Date: 9/21/2021 End Date: --    FLUOROURACIL (EFUDEX) 5 % CREAM    Use hs for 2 weeks       Start Date: 7/29/2021 End Date: --    IBUPROFEN (ADVIL,MOTRIN) 200 MG TABLET    Take 200 mg by mouth every 6 (six) hours as needed for Pain.       Start Date: --        End Date: --    METOPROLOL TARTRATE (LOPRESSOR) 25 MG TABLET    TAKE 1 TABLET BY MOUTH TWICE DAILY (DISCONTINUE ATENOLOL)       Start Date: 9/21/2021 End Date: --    NALOXONE (NARCAN)  4 MG/ACTUATION SPRY    4mg by nasal route as needed for opioid overdose; may repeat every 2-3 minutes in alternating nostrils until medical help arrives. Call 911       Start Date: 9/21/2021 End Date: --    NITROGLYCERIN (NITROSTAT) 0.4 MG SL TABLET    Place 1 tablet (0.4 mg total) under the tongue every 5 (five) minutes as needed for Chest pain.       Start Date: 10/14/2021End Date: 11/13/2021    ONDANSETRON (ZOFRAN) 4 MG TABLET    Take 1 tablet (4 mg total) by mouth every 6 (six) hours as needed for Nausea.       Start Date: 9/13/2020 End Date: --    TRAMADOL (ULTRAM) 50 MG TABLET    TAKE 1 TABLET(S) BY MOUTH EVERY 4 HOURS WHILE AWAKE AND TAKE 2 TABLET(S) AT BEDTIME       Start Date: 9/21/2021 End Date: --

## 2022-02-15 RX ORDER — ALPRAZOLAM 2 MG/1
2 TABLET ORAL NIGHTLY PRN
Qty: 25 TABLET | Refills: 1 | Status: SHIPPED | OUTPATIENT
Start: 2022-02-15 | End: 2022-07-28

## 2022-04-20 DIAGNOSIS — F41.9 ANXIETY: ICD-10-CM

## 2022-04-20 DIAGNOSIS — I10 ESSENTIAL HYPERTENSION: ICD-10-CM

## 2022-04-20 DIAGNOSIS — M79.7 FIBROMYALGIA: ICD-10-CM

## 2022-04-20 RX ORDER — TRAMADOL HYDROCHLORIDE 50 MG/1
TABLET ORAL
Qty: 150 TABLET | Refills: 3 | Status: SHIPPED | OUTPATIENT
Start: 2022-04-20 | End: 2022-08-30

## 2022-04-20 RX ORDER — ESCITALOPRAM OXALATE 10 MG/1
TABLET ORAL
Qty: 90 TABLET | Refills: 1 | Status: SHIPPED | OUTPATIENT
Start: 2022-04-20 | End: 2023-04-11

## 2022-04-20 RX ORDER — METOPROLOL TARTRATE 25 MG/1
TABLET, FILM COATED ORAL
Qty: 180 TABLET | Refills: 0 | Status: SHIPPED | OUTPATIENT
Start: 2022-04-20 | End: 2022-12-31

## 2022-04-20 NOTE — TELEPHONE ENCOUNTER
Care Due:                  Date            Visit Type   Department     Provider  --------------------------------------------------------------------------------                                ESTABLISHED   [facility]                              PATIENT -    MEDICINE/INTERN  Last Visit: 09-      [facility]         Dino Lamin  Lucien  Next Visit: None Scheduled  None         None Found                                                            Last  Test          Frequency    Reason                     Performed    Due Date  --------------------------------------------------------------------------------    CMP.........  12 months..  atorvastatin.............  05- 04-    Lipid Panel.  12 months..  atorvastatin.............  05- 05-    Powered by [employer]. Reference number: [id number].   4/20/2022 2:01:03 PM CDT

## 2022-04-20 NOTE — TELEPHONE ENCOUNTER
Refill Routing Note   Medication(s) are not appropriate for processing by [facility] for the following reason(s):      - Outside of protocol    [facility] action(s):  Route  Approve Medication-related problems identified: Requires labs     Medication Therapy Plan: Route ultram. Approve lopressor and lexapro.  Medication reconciliation completed: No     Appointments  past 12m or future 3m with PCP    Date Provider   Last Visit   9/21/2021 Dino Mcgraw MD   Next Visit   Visit date not found Dino Mcgraw MD   ED visits in past 90 days: 0        Note composed:3:06 PM 04/20/2022

## 2022-09-20 DIAGNOSIS — I10 ESSENTIAL HYPERTENSION: ICD-10-CM

## 2022-10-03 NOTE — ED TRIAGE NOTES
Presented with chest pain. Started around 230 this afternoon.   Started in the back and came through the chest.  Has a family hx of  MI.    
Her/She

## 2023-02-16 ENCOUNTER — TELEPHONE (OUTPATIENT)
Dept: FAMILY MEDICINE | Facility: CLINIC | Age: 58
End: 2023-02-16
Payer: COMMERCIAL

## 2023-02-16 DIAGNOSIS — M79.7 FIBROMYALGIA: ICD-10-CM

## 2023-02-16 RX ORDER — TRAMADOL HYDROCHLORIDE 50 MG/1
TABLET ORAL
Qty: 150 TABLET | Refills: 3 | Status: SHIPPED | OUTPATIENT
Start: 2023-02-16 | End: 2023-05-04 | Stop reason: SDUPTHER

## 2023-02-16 NOTE — TELEPHONE ENCOUNTER
Return call to patient, and informed that she would need to schedule an appointment for a refill on pain medication. That the provider has not seen her in over  a year. Patient was upset and hung up the phone in my face.

## 2023-02-16 NOTE — TELEPHONE ENCOUNTER
----- Message from Johnna Reardon sent at 2/16/2023  1:20 PM CST -----  Regarding: self [phone]  Type: RX Refill Request    Who Called:  self     Have you contacted your pharmacy: yes    Refill or New Rx: refill     RX Name and Strength:traMADoL (ULTRAM) 50 mg tablet    Preferred Pharmacy with phone number:   [facility] [address] [address] [address]  Phone: [phone] Fax: [phone]    Local or Mail Order: local     Would the patient rather a call back or a response via [facility]? Call back     Best Call Back Number: [phone]      Pt stated the pharmacy sent request 2 times, she only has 3 left and she takes 2 in the AM and 2 at night.

## 2023-02-16 NOTE — TELEPHONE ENCOUNTER
No new care gaps identified.  [facility] Embedded Care Gaps. Reference number: [id number]. 2/16/2023   3:21:26 PM CST

## 2023-04-03 ENCOUNTER — TELEPHONE (OUTPATIENT)
Dept: FAMILY MEDICINE | Facility: CLINIC | Age: 58
End: 2023-04-03
Payer: COMMERCIAL

## 2023-05-04 ENCOUNTER — OFFICE VISIT (OUTPATIENT)
Dept: FAMILY MEDICINE | Facility: CLINIC | Age: 58
End: 2023-05-04
Payer: COMMERCIAL

## 2023-05-04 VITALS
HEART RATE: 77 BPM | SYSTOLIC BLOOD PRESSURE: 120 MMHG | HEIGHT: 64 IN | WEIGHT: 167.56 LBS | BODY MASS INDEX: 28.6 KG/M2 | DIASTOLIC BLOOD PRESSURE: 80 MMHG | TEMPERATURE: 98 F | OXYGEN SATURATION: 98 %

## 2023-05-04 DIAGNOSIS — R94.39 ABNORMAL STRESS TEST: ICD-10-CM

## 2023-05-04 DIAGNOSIS — Z00.00 ANNUAL PHYSICAL EXAM: Primary | ICD-10-CM

## 2023-05-04 DIAGNOSIS — I10 ESSENTIAL HYPERTENSION: ICD-10-CM

## 2023-05-04 DIAGNOSIS — F41.9 ANXIETY: ICD-10-CM

## 2023-05-04 DIAGNOSIS — M79.7 FIBROMYALGIA: ICD-10-CM

## 2023-05-04 PROCEDURE — 3079F PR MOST RECENT DIASTOLIC BLOOD PRESSURE 80-89 MM HG: ICD-10-PCS | Mod: CPTII,S$GLB,, | Performed by: FAMILY MEDICINE

## 2023-05-04 PROCEDURE — 3008F PR BODY MASS INDEX (BMI) DOCUMENTED: ICD-10-PCS | Mod: CPTII,S$GLB,, | Performed by: FAMILY MEDICINE

## 2023-05-04 PROCEDURE — 99396 PR PREVENTIVE VISIT,EST,40-64: ICD-10-PCS | Mod: S$GLB,,, | Performed by: FAMILY MEDICINE

## 2023-05-04 PROCEDURE — 1159F MED LIST DOCD IN RCRD: CPT | Mod: CPTII,S$GLB,, | Performed by: FAMILY MEDICINE

## 2023-05-04 PROCEDURE — 3044F PR MOST RECENT HEMOGLOBIN A1C LEVEL <7.0%: ICD-10-PCS | Mod: CPTII,S$GLB,, | Performed by: FAMILY MEDICINE

## 2023-05-04 PROCEDURE — 99396 PREV VISIT EST AGE 40-64: CPT | Mod: S$GLB,,, | Performed by: FAMILY MEDICINE

## 2023-05-04 PROCEDURE — 1159F PR MEDICATION LIST DOCUMENTED IN MEDICAL RECORD: ICD-10-PCS | Mod: CPTII,S$GLB,, | Performed by: FAMILY MEDICINE

## 2023-05-04 PROCEDURE — 3044F HG A1C LEVEL LT 7.0%: CPT | Mod: CPTII,S$GLB,, | Performed by: FAMILY MEDICINE

## 2023-05-04 PROCEDURE — 99999 PR PBB SHADOW E&M-EST. PATIENT-LVL IV: CPT | Mod: PBBFAC,,, | Performed by: FAMILY MEDICINE

## 2023-05-04 PROCEDURE — 3074F PR MOST RECENT SYSTOLIC BLOOD PRESSURE < 130 MM HG: ICD-10-PCS | Mod: CPTII,S$GLB,, | Performed by: FAMILY MEDICINE

## 2023-05-04 PROCEDURE — 3074F SYST BP LT 130 MM HG: CPT | Mod: CPTII,S$GLB,, | Performed by: FAMILY MEDICINE

## 2023-05-04 PROCEDURE — 99999 PR PBB SHADOW E&M-EST. PATIENT-LVL IV: ICD-10-PCS | Mod: PBBFAC,,, | Performed by: FAMILY MEDICINE

## 2023-05-04 PROCEDURE — 3079F DIAST BP 80-89 MM HG: CPT | Mod: CPTII,S$GLB,, | Performed by: FAMILY MEDICINE

## 2023-05-04 PROCEDURE — 3008F BODY MASS INDEX DOCD: CPT | Mod: CPTII,S$GLB,, | Performed by: FAMILY MEDICINE

## 2023-05-04 RX ORDER — METOPROLOL TARTRATE 25 MG/1
TABLET, FILM COATED ORAL
Qty: 180 TABLET | Refills: 0 | Status: SHIPPED | OUTPATIENT
Start: 2023-05-04 | End: 2023-08-07 | Stop reason: SDUPTHER

## 2023-05-04 RX ORDER — NITROGLYCERIN 0.4 MG/1
0.4 TABLET SUBLINGUAL EVERY 5 MIN PRN
Qty: 90 TABLET | Refills: 3 | Status: SHIPPED | OUTPATIENT
Start: 2023-05-04 | End: 2023-06-03

## 2023-05-04 RX ORDER — DIAZEPAM 5 MG/1
TABLET ORAL
COMMUNITY
Start: 2022-08-17

## 2023-05-04 RX ORDER — TRAMADOL HYDROCHLORIDE 50 MG/1
TABLET ORAL
Qty: 150 TABLET | Refills: 3 | Status: SHIPPED | OUTPATIENT
Start: 2023-05-04 | End: 2023-07-27 | Stop reason: SDUPTHER

## 2023-05-04 RX ORDER — ALPRAZOLAM 2 MG/1
TABLET ORAL
Qty: 25 TABLET | Refills: 5 | Status: SHIPPED | OUTPATIENT
Start: 2023-05-04 | End: 2024-03-28

## 2023-05-04 RX ORDER — ATORVASTATIN CALCIUM 40 MG/1
40 TABLET, FILM COATED ORAL NIGHTLY
Qty: 90 TABLET | Refills: 3 | Status: SHIPPED | OUTPATIENT
Start: 2023-05-04

## 2023-05-04 NOTE — PROGRESS NOTES
Chief Complaint   Patient presents with    Annual Exam       SUBJECTIVE:   57 y.o. female for annual routine checkup.    Current Outpatient Medications   Medication Sig Dispense Refill    aspirin (ECOTRIN) 81 MG EC tablet Take 81 mg by mouth once daily.      diazePAM (VALIUM) 5 MG tablet TAKE 1 TABLET(S) BY MOUTH EVERY 8 HOURS AS NEEDED. MAY CAUSE DROWSINESS      EScitalopram oxalate (LEXAPRO) 10 MG tablet TAKE 1 TABLET(S) BY MOUTH ONCE A DAY 90 tablet 1    fluorouraciL (EFUDEX) 5 % cream Use hs for 2 weeks 40 g 3    ibuprofen (ADVIL,MOTRIN) 200 MG tablet Take 200 mg by mouth every 6 (six) hours as needed for Pain.      naloxone (NARCAN) 4 mg/actuation Spry 4mg by nasal route as needed for opioid overdose; may repeat every 2-3 minutes in alternating nostrils until medical help arrives. Call 911 1 each 11    ondansetron (ZOFRAN) 4 MG tablet Take 1 tablet (4 mg total) by mouth every 6 (six) hours as needed for Nausea. 20 tablet 0    ALPRAZolam (XANAX) 2 MG Tab Take 1 tablet (2 mg total) by mouth nightly as needed for (insomnia/anxiety). MAY CAUSE DROWSINESS 25 tablet 5    atorvastatin (LIPITOR) 40 MG tablet Take 1 tablet (40 mg total) by mouth every evening. 90 tablet 3    metoprolol tartrate (LOPRESSOR) 25 MG tablet TAKE 1 TABLET BY MOUTH TWICE DAILY (DISCONTINUE ATENOLOL) 180 tablet 0    nitroGLYCERIN (NITROSTAT) 0.4 MG SL tablet Place 1 tablet (0.4 mg total) under the tongue every 5 (five) minutes as needed for Chest pain. 90 tablet 3    traMADoL (ULTRAM) 50 mg tablet TAKE 1 TABLET(S) BY MOUTH EVERY 4 HOURS WHILE AWAKE AND TAKE 2 TABLET(S) AT BEDTIME Strength: 50 mg 150 tablet 3     No current facility-administered medications for this visit.     Allergies: Erythromycin; Adhesive tape-silicones; Latex, natural rubber; Cetylpyridinium-benzocaine; and Morphine   Patient's last menstrual period was 10/11/2017.    ROS:  Feeling well. No dyspnea or chest pain on exertion.  No abdominal pain, change in bowel habits,  "black or bloody stools.  No urinary tract symptoms. GYN ROS: she complains of medication. No neurological complaints.    OBJECTIVE:   The patient appears well, alert, oriented x 3, in no distress.  /80   Pulse 77   Temp 98.3 °F (36.8 °C) (Oral)   Ht 5' 4" (1.626 m)   Wt 76 kg (167 lb 8.8 oz)   LMP 10/11/2017   SpO2 98%   BMI 28.76 kg/m²   Wt Readings from Last 5 Encounters:   05/04/23 76 kg (167 lb 8.8 oz)   10/14/21 78 kg (172 lb)   07/29/21 79.8 kg (176 lb)   06/10/21 80 kg (176 lb 5.9 oz)   06/07/21 81.1 kg (178 lb 12.7 oz)       ENT normal.  Neck supple. No adenopathy or thyromegaly. GERMAN. Lungs are clear, good air entry, no wheezes, rhonchi or rales. S1 and S2 normal, no murmurs, regular rate and rhythm. Abdomen soft without tenderness, guarding, mass or organomegaly. Extremities show no edema, normal peripheral pulses. Neurological is normal, no focal findings.      BREAST EXAM: deferred    PELVIC EXAM: deferred    ASSESSMENT:   1. Annual physical exam    2. Essential hypertension    3. Anxiety    4. Fibromyalgia    5. Abnormal stress test          PLAN:   Counseled on age appropriate medical preventative services, including age appropriate cancer screenings, over all nutritional health, need for a consistent exercise regimen and an over all push towards maintaining a vigorous and active lifestyle.  Counseled on age appropriate vaccines and discussed upcoming health care needs based on age/gender.  Spent time with patient counseling on need for a good patient/doctor relationship moving forward.  Discussed use of common OTC medications and supplements.  Discussed common dietary aids and use of caffeine and the need for good sleep hygiene and stress management.    Problem List Items Addressed This Visit       Fibromyalgia (Chronic)    Overview     4/17 start norvasc 2.5, increase monthly by 2.5 to 10 mg.           Relevant Medications    traMADoL (ULTRAM) 50 mg tablet    Essential hypertension " (Chronic)    Relevant Medications    atorvastatin (LIPITOR) 40 MG tablet    metoprolol tartrate (LOPRESSOR) 25 MG tablet    Anxiety    Relevant Medications    ALPRAZolam (XANAX) 2 MG Tab    Abnormal stress test    Relevant Medications    nitroGLYCERIN (NITROSTAT) 0.4 MG SL tablet     Other Visit Diagnoses       Annual physical exam    -  Primary            F/u in 1 year for wellness

## 2023-05-10 ENCOUNTER — PATIENT MESSAGE (OUTPATIENT)
Dept: ADMINISTRATIVE | Facility: HOSPITAL | Age: 58
End: 2023-05-10
Payer: COMMERCIAL

## 2023-05-10 ENCOUNTER — TELEPHONE (OUTPATIENT)
Dept: FAMILY MEDICINE | Facility: CLINIC | Age: 58
End: 2023-05-10
Payer: COMMERCIAL

## 2023-05-10 NOTE — TELEPHONE ENCOUNTER
----- Message from Dino Mcgraw MD sent at 5/10/2023  7:19 AM CDT -----  Let her know blood work is good just shows a little stress, which I expected.  We can repeat in 1 year  JR

## 2023-07-27 DIAGNOSIS — M79.7 FIBROMYALGIA: ICD-10-CM

## 2023-07-27 NOTE — TELEPHONE ENCOUNTER
No care due was identified.  [facility] Embedded Care Due Messages. Reference number: [id number].   7/27/2023 3:04:40 PM CDT

## 2023-07-27 NOTE — TELEPHONE ENCOUNTER
----- Message from More Gomes sent at 7/27/2023  2:57 PM CDT -----  Regarding: self  .Type: RX Refill Request    Who Called: self    Have you contacted your pharmacy: no    Refill or New Rx: refill    RX Name and Strength: traMADoL (ULTRAM) 50 mg tablet  Preferred Pharmacy with phone number: .  [facility] [address] [place] [address]  [address] [address] [address]  Phone: [phone] Fax: [phone]      Local or Mail Order: local    Would the patient rather a call back or a response via [facility]? Call back    Best Call Back Number .[phone]

## 2023-07-28 RX ORDER — TRAMADOL HYDROCHLORIDE 50 MG/1
TABLET ORAL
Qty: 150 TABLET | Refills: 3 | Status: SHIPPED | OUTPATIENT
Start: 2023-07-28 | End: 2024-01-29 | Stop reason: SDUPTHER

## 2023-08-07 DIAGNOSIS — I10 ESSENTIAL HYPERTENSION: ICD-10-CM

## 2023-08-07 RX ORDER — METOPROLOL TARTRATE 25 MG/1
TABLET, FILM COATED ORAL
Qty: 180 TABLET | Refills: 0 | Status: SHIPPED | OUTPATIENT
Start: 2023-08-07 | End: 2024-03-27

## 2023-08-07 NOTE — TELEPHONE ENCOUNTER
No care due was identified.  [facility] Embedded Care Due Messages. Reference number: [id number].   8/07/2023 11:49:45 AM CDT

## 2023-10-30 DIAGNOSIS — F41.9 ANXIETY: ICD-10-CM

## 2023-10-30 DIAGNOSIS — M79.7 FIBROMYALGIA: ICD-10-CM

## 2023-10-30 NOTE — TELEPHONE ENCOUNTER
No care due was identified.  [facility] Embedded Care Due Messages. Reference number: [id number].   10/30/2023 2:46:01 PM CDT

## 2023-10-31 RX ORDER — ESCITALOPRAM OXALATE 10 MG/1
10 TABLET ORAL
Qty: 90 TABLET | Refills: 1 | Status: SHIPPED | OUTPATIENT
Start: 2023-10-31

## 2023-10-31 NOTE — TELEPHONE ENCOUNTER
Refill Decision Note   Vane Seo  is requesting a refill authorization.  Brief Assessment and Rationale for Refill:  Approve     Medication Therapy Plan:         Comments:     Note composed:8:33 AM 10/31/2023

## 2023-11-03 ENCOUNTER — PATIENT OUTREACH (OUTPATIENT)
Dept: ADMINISTRATIVE | Facility: HOSPITAL | Age: 58
End: 2023-11-03
Payer: COMMERCIAL

## 2023-11-03 NOTE — PROGRESS NOTES
Health Maintenance Due   Topic Date Due    Colorectal Cancer Screening  07/21/2022    Influenza Vaccine (1) Never done     Chart review done. HM updated. Immunizations reviewed & updated. Care Everywhere updated.

## 2024-01-29 ENCOUNTER — TELEPHONE (OUTPATIENT)
Dept: FAMILY MEDICINE | Facility: CLINIC | Age: 59
End: 2024-01-29
Payer: COMMERCIAL

## 2024-01-29 DIAGNOSIS — M79.7 FIBROMYALGIA: ICD-10-CM

## 2024-01-29 RX ORDER — TRAMADOL HYDROCHLORIDE 50 MG/1
TABLET ORAL
Qty: 150 TABLET | Refills: 0 | Status: SHIPPED | OUTPATIENT
Start: 2024-01-29 | End: 2024-03-28

## 2024-01-29 RX ORDER — TRAMADOL HYDROCHLORIDE 50 MG/1
TABLET ORAL
Qty: 150 TABLET | Refills: 3 | OUTPATIENT
Start: 2024-01-29

## 2024-01-29 NOTE — TELEPHONE ENCOUNTER
No care due was identified.  Health [facility] Embedded Care Due Messages. Reference number: [id number].   1/29/2024 10:11:41 AM CST

## 2024-02-06 DIAGNOSIS — Z12.11 COLON CANCER SCREENING: ICD-10-CM

## 2024-03-01 ENCOUNTER — TELEPHONE (OUTPATIENT)
Dept: PHARMACY | Facility: CLINIC | Age: 59
End: 2024-03-01
Payer: COMMERCIAL

## 2024-03-01 NOTE — TELEPHONE ENCOUNTER
Contacted the patient to perform Medication Therapy Management review, specifically in reference to refilling atorvastain 40 mg to improve PDC for HEDIS measurements.   Waiting for patient response to verify

## 2024-03-26 DIAGNOSIS — M79.7 FIBROMYALGIA: ICD-10-CM

## 2024-03-26 DIAGNOSIS — F41.9 ANXIETY: ICD-10-CM

## 2024-03-26 DIAGNOSIS — I10 ESSENTIAL HYPERTENSION: ICD-10-CM

## 2024-03-26 NOTE — TELEPHONE ENCOUNTER
Care Due:                  Date            Visit Type   Department     Provider  --------------------------------------------------------------------------------                                [facility]                              PRIMARY      MEDICINE/INTERN  Last Visit: 05-      CARE ([facility])   [facility]         Dino Mcgraw  Next Visit: None Scheduled  None         None Found                                                            Last  Test          Frequency    Reason                     Performed    Due Date  --------------------------------------------------------------------------------    CMP.........  12 months..  atorvastatin.............  05- 04-    Lipid Panel.  12 months..  atorvastatin.............  05- 04-    Health [facility] Embedded Care Due Messages. Reference number: [id number].   3/26/2024 9:45:41 AM CDT

## 2024-03-26 NOTE — TELEPHONE ENCOUNTER
No care due was identified.  Health [facility] Embedded Care Due Messages. Reference number: [id number].   3/26/2024 9:46:12 AM CDT

## 2024-03-27 RX ORDER — METOPROLOL TARTRATE 25 MG/1
TABLET, FILM COATED ORAL
Qty: 180 TABLET | Refills: 0 | Status: SHIPPED | OUTPATIENT
Start: 2024-03-27 | End: 2024-06-17 | Stop reason: SDUPTHER

## 2024-03-27 NOTE — TELEPHONE ENCOUNTER
Refill Decision Note   Vane Seo  is requesting a refill authorization.    Brief Assessment and Rationale for Refill:  Approve       Medication Therapy Plan:         Comments:     Note composed:11:03 AM 03/27/2024

## 2024-03-28 RX ORDER — ALPRAZOLAM 2 MG/1
TABLET ORAL
Qty: 25 TABLET | Refills: 0 | Status: SHIPPED | OUTPATIENT
Start: 2024-03-28 | End: 2024-06-17 | Stop reason: SDUPTHER

## 2024-03-28 RX ORDER — TRAMADOL HYDROCHLORIDE 50 MG/1
TABLET ORAL
Qty: 150 TABLET | Refills: 0 | Status: SHIPPED | OUTPATIENT
Start: 2024-03-28 | End: 2024-05-20 | Stop reason: SDUPTHER

## 2024-04-16 DIAGNOSIS — M79.7 FIBROMYALGIA: ICD-10-CM

## 2024-04-16 DIAGNOSIS — F41.9 ANXIETY: ICD-10-CM

## 2024-04-16 RX ORDER — ESCITALOPRAM OXALATE 10 MG/1
10 TABLET ORAL
Qty: 90 TABLET | Refills: 0 | Status: SHIPPED | OUTPATIENT
Start: 2024-04-16 | End: 2024-06-17 | Stop reason: SDUPTHER

## 2024-04-16 NOTE — TELEPHONE ENCOUNTER
No care due was identified.  [facility] Embedded Care Due Messages. Reference number: [id number].   4/16/2024 1:11:43 PM CDT

## 2024-04-17 NOTE — TELEPHONE ENCOUNTER
Refill Decision Note   Vane Seo  is requesting a refill authorization.  Brief Assessment and Rationale for Refill:  Approve     Medication Therapy Plan:         Comments:     Note composed:10:54 PM 04/16/2024

## 2024-05-16 DIAGNOSIS — M79.7 FIBROMYALGIA: ICD-10-CM

## 2024-05-16 NOTE — TELEPHONE ENCOUNTER
Care Due:                  Date            Visit Type   Department     Provider  --------------------------------------------------------------------------------                                [facility]                              PRIMARY      MEDICINE/INTERN  Last Visit: 05-      CARE ([facility])   [facility]         Dino Mcgraw  Next Visit: None Scheduled  None         None Found                                                            Last  Test          Frequency    Reason                     Performed    Due Date  --------------------------------------------------------------------------------    Office Visit  15 months..  EScitalopram,              05- 07-                             atorvastatin,                             nitroGLYCERIN............    [facility] [employer] Embedded Care Due Messages. Reference number: [id number].   5/16/2024 2:22:59 PM CDT

## 2024-05-17 RX ORDER — TRAMADOL HYDROCHLORIDE 50 MG/1
TABLET ORAL
Qty: 150 TABLET | Refills: 0 | OUTPATIENT
Start: 2024-05-17

## 2024-05-20 DIAGNOSIS — F41.9 ANXIETY: ICD-10-CM

## 2024-05-20 DIAGNOSIS — M79.7 FIBROMYALGIA: ICD-10-CM

## 2024-05-20 RX ORDER — TRAMADOL HYDROCHLORIDE 50 MG/1
TABLET ORAL
Qty: 150 TABLET | Refills: 0 | Status: SHIPPED | OUTPATIENT
Start: 2024-05-20 | End: 2024-06-17 | Stop reason: SDUPTHER

## 2024-05-20 NOTE — TELEPHONE ENCOUNTER
No care due was identified.  [facility] Embedded Care Due Messages. Reference number: [id number].   5/20/2024 12:01:50 PM CDT

## 2024-05-20 NOTE — TELEPHONE ENCOUNTER
----- Message from Charisse Colon sent at 5/20/2024 11:39 AM CDT -----  Regarding: Patient call back  .Type: Patient Call Back    Who called:self     What is the request in detail:states she called on 05/17/2024 to request a refill/authorization on traMADoL (ULTRAM) 50 mg tablet and EScitalopram oxalate (LEXAPRO) 10 MG tablet. Asking if  Medications have not been authorized,asking if Dr. Mcgraw will authorized these medications/checking status of authorization      Can the clinic reply by [facility]?no     Would the patient rather a call back or a response via [facility]? Call     Best call back number:.[phone]      Additional Information:

## 2024-05-20 NOTE — TELEPHONE ENCOUNTER
Patient has been scheduled for appointment with PCP on 6/17/2024. Requesting refill until her upcoming visit.

## 2024-06-17 ENCOUNTER — OFFICE VISIT (OUTPATIENT)
Dept: FAMILY MEDICINE | Facility: CLINIC | Age: 59
End: 2024-06-17
Payer: COMMERCIAL

## 2024-06-17 VITALS
HEIGHT: 64 IN | DIASTOLIC BLOOD PRESSURE: 68 MMHG | SYSTOLIC BLOOD PRESSURE: 118 MMHG | HEART RATE: 64 BPM | BODY MASS INDEX: 27.48 KG/M2 | WEIGHT: 160.94 LBS | TEMPERATURE: 98 F | OXYGEN SATURATION: 98 %

## 2024-06-17 DIAGNOSIS — Z00.00 ANNUAL PHYSICAL EXAM: Primary | ICD-10-CM

## 2024-06-17 DIAGNOSIS — G89.3 CANCER ASSOCIATED PAIN: ICD-10-CM

## 2024-06-17 DIAGNOSIS — M79.7 FIBROMYALGIA: ICD-10-CM

## 2024-06-17 DIAGNOSIS — I10 ESSENTIAL HYPERTENSION: ICD-10-CM

## 2024-06-17 DIAGNOSIS — F41.9 ANXIETY: ICD-10-CM

## 2024-06-17 PROCEDURE — 99396 PREV VISIT EST AGE 40-64: CPT | Mod: S$GLB,,, | Performed by: FAMILY MEDICINE

## 2024-06-17 PROCEDURE — 3008F BODY MASS INDEX DOCD: CPT | Mod: CPTII,S$GLB,, | Performed by: FAMILY MEDICINE

## 2024-06-17 PROCEDURE — 99999 PR PBB SHADOW E&M-EST. PATIENT-LVL IV: CPT | Mod: PBBFAC,,, | Performed by: FAMILY MEDICINE

## 2024-06-17 PROCEDURE — 3078F DIAST BP <80 MM HG: CPT | Mod: CPTII,S$GLB,, | Performed by: FAMILY MEDICINE

## 2024-06-17 PROCEDURE — 1159F MED LIST DOCD IN RCRD: CPT | Mod: CPTII,S$GLB,, | Performed by: FAMILY MEDICINE

## 2024-06-17 PROCEDURE — 3074F SYST BP LT 130 MM HG: CPT | Mod: CPTII,S$GLB,, | Performed by: FAMILY MEDICINE

## 2024-06-17 RX ORDER — TRAMADOL HYDROCHLORIDE 50 MG/1
TABLET ORAL
Qty: 150 TABLET | Refills: 2 | Status: SHIPPED | OUTPATIENT
Start: 2024-06-17

## 2024-06-17 RX ORDER — METOPROLOL TARTRATE 25 MG/1
TABLET, FILM COATED ORAL
Qty: 180 TABLET | Refills: 3 | Status: SHIPPED | OUTPATIENT
Start: 2024-06-17

## 2024-06-17 RX ORDER — ALPRAZOLAM 2 MG/1
TABLET ORAL
Qty: 25 TABLET | Refills: 2 | Status: SHIPPED | OUTPATIENT
Start: 2024-06-17

## 2024-06-17 RX ORDER — ESCITALOPRAM OXALATE 10 MG/1
10 TABLET ORAL NIGHTLY
Qty: 90 TABLET | Refills: 3 | Status: SHIPPED | OUTPATIENT
Start: 2024-06-17

## 2024-08-30 ENCOUNTER — PATIENT OUTREACH (OUTPATIENT)
Dept: ADMINISTRATIVE | Facility: HOSPITAL | Age: 59
End: 2024-08-30
Payer: COMMERCIAL

## 2024-08-30 NOTE — PROGRESS NOTES
Health Maintenance Due   Topic Date Due    Pneumococcal Vaccines (Age 0-64) (1 of 2 - PCV) Never done    Shingles Vaccine (1 of 2) Never done    TETANUS VACCINE  09/15/2015    Colorectal Cancer Screening  11/09/2020    Lipid Panel  05/04/2024    Cervical Cancer Screening  07/21/2024   Chart review done. HM updated. Immunizations reviewed & updated. Care Everywhere updated.  PORTAL MESSAGE SENT TO PATIENT ABOUT OVERDUE HM AND ADDED TO APPOINTMENT NOTE

## 2024-09-09 DIAGNOSIS — I10 ESSENTIAL HYPERTENSION: ICD-10-CM

## 2024-09-10 RX ORDER — ATORVASTATIN CALCIUM 40 MG/1
40 TABLET, FILM COATED ORAL NIGHTLY
Qty: 90 TABLET | Refills: 0 | Status: SHIPPED | OUTPATIENT
Start: 2024-09-10

## 2024-09-10 NOTE — TELEPHONE ENCOUNTER
Refill Routing Note   Medication(s) are not appropriate for processing by [facility] for the following reason(s):        Required labs outdated    [facility] action(s):  Defer     Requires labs : Yes             Appointments  past 12m or future 3m with PCP    Date Provider   Last Visit   6/17/2024 Dino Mcgraw MD   Next Visit   12/13/2024 Dino Mcgraw MD   ED visits in past 90 days: 0        Note composed:6:35 AM 09/10/2024

## 2024-10-17 DIAGNOSIS — M79.7 FIBROMYALGIA: ICD-10-CM

## 2024-10-17 DIAGNOSIS — G89.3 CANCER ASSOCIATED PAIN: ICD-10-CM

## 2024-10-18 RX ORDER — TRAMADOL HYDROCHLORIDE 50 MG/1
TABLET ORAL
Qty: 150 TABLET | Refills: 2 | Status: SHIPPED | OUTPATIENT
Start: 2024-10-18

## 2024-10-21 NOTE — TELEPHONE ENCOUNTER
----- Message from Angelika Ulrich sent at 3/20/2019 12:23 PM CDT -----  Contact: [facility] - Trice Carnes with [employer] called to verify the patient's scripts because they were red flagged and she has to make sure it's what the doctor wants. Please call [facility]  At [phone].    ALPRAZolam (XANAX) 2 MG Tab    traMADol (ULTRAM) 50 mg tablet      [facility] [address]   IABP inserted in the right femoral artery. IABP inserted with 50 cc balloon volume Lot number is: [id number]

## 2024-12-27 DIAGNOSIS — G89.3 CANCER ASSOCIATED PAIN: ICD-10-CM

## 2024-12-27 DIAGNOSIS — M79.7 FIBROMYALGIA: ICD-10-CM

## 2024-12-27 RX ORDER — TRAMADOL HYDROCHLORIDE 50 MG/1
TABLET ORAL
Qty: 150 TABLET | Refills: 0 | Status: SHIPPED | OUTPATIENT
Start: 2024-12-27

## 2024-12-27 NOTE — TELEPHONE ENCOUNTER
Care Due:                  Date            Visit Type   Department     Provider  --------------------------------------------------------------------------------                                [facility]                              PRIMARY      MEDICINE/INTERN  Last Visit: 06-      CARE ([facility])   [facility]         Dino Mcgraw  Next Visit: None Scheduled  None         None Found                                                            Last  Test          Frequency    Reason                     Performed    Due Date  --------------------------------------------------------------------------------    CMP.........  12 months..  atorvastatin.............  05- 04-    Lipid Panel.  12 months..  atorvastatin.............  05- 04-    Health [facility] Embedded Care Due Messages. Reference number: [id number].   12/27/2024 2:36:25 PM CST

## 2025-02-19 DIAGNOSIS — I10 ESSENTIAL HYPERTENSION: ICD-10-CM

## 2025-03-31 DIAGNOSIS — G89.3 CANCER ASSOCIATED PAIN: ICD-10-CM

## 2025-03-31 DIAGNOSIS — M79.7 FIBROMYALGIA: ICD-10-CM

## 2025-03-31 RX ORDER — TRAMADOL HYDROCHLORIDE 50 MG/1
TABLET ORAL
Qty: 150 TABLET | Refills: 0 | Status: SHIPPED | OUTPATIENT
Start: 2025-03-31

## 2025-03-31 NOTE — TELEPHONE ENCOUNTER
Care Due:                  Date            Visit Type   Department     Provider  --------------------------------------------------------------------------------                                [facility]                              PRIMARY      MEDICINE/INTERN  Last Visit: 06-      CARE ([facility])   [facility]         Dino Mcgraw  Next Visit: None Scheduled  None         None Found                                                            Last  Test          Frequency    Reason                     Performed    Due Date  --------------------------------------------------------------------------------    CMP.........  12 months..  atorvastatin.............  05- 04-    Lipid Panel.  12 months..  atorvastatin.............  05- 04-    Health [facility] Embedded Care Due Messages. Reference number: [id number].   3/31/2025 4:47:26 PM CDT

## 2025-05-03 DIAGNOSIS — M79.7 FIBROMYALGIA: ICD-10-CM

## 2025-05-03 DIAGNOSIS — G89.3 CANCER ASSOCIATED PAIN: ICD-10-CM

## 2025-05-03 NOTE — TELEPHONE ENCOUNTER
No care due was identified.  [facility] Embedded Care Due Messages. Reference number: [id number].   5/03/2025 1:17:30 PM CDT

## 2025-05-05 RX ORDER — TRAMADOL HYDROCHLORIDE 50 MG/1
TABLET ORAL
Qty: 150 TABLET | Refills: 0 | Status: SHIPPED | OUTPATIENT
Start: 2025-05-05

## 2025-06-24 ENCOUNTER — E-VISIT (OUTPATIENT)
Dept: FAMILY MEDICINE | Facility: CLINIC | Age: 60
End: 2025-06-24
Payer: COMMERCIAL

## 2025-06-24 DIAGNOSIS — G89.3 CANCER ASSOCIATED PAIN: ICD-10-CM

## 2025-06-24 DIAGNOSIS — I10 ESSENTIAL HYPERTENSION: ICD-10-CM

## 2025-06-24 DIAGNOSIS — M79.7 FIBROMYALGIA: ICD-10-CM

## 2025-06-24 PROCEDURE — 99422 OL DIG E/M SVC 11-20 MIN: CPT | Mod: ,,, | Performed by: FAMILY MEDICINE

## 2025-06-24 RX ORDER — TRAMADOL HYDROCHLORIDE 50 MG/1
TABLET, FILM COATED ORAL
Qty: 150 TABLET | Refills: 0 | Status: CANCELLED | OUTPATIENT
Start: 2025-06-24

## 2025-06-24 NOTE — TELEPHONE ENCOUNTER
Copied from CRM #[id number]. Topic: Medications - Medication Refill  >> Jun 24, 2025  2:08 PM David wrote:  Type: RX Refill Request    Who Called: self    Have you contacted your pharmacy: no    Refill or New Rx:refill    RX Name and Strength:traMADoL (ULTRAM) 50 mg tablet    Preferred Pharmacy with phone number:[facility] [place] - [address] [place] [address]  [address] [address] [address]  Phone: [phone] Fax: [phone]  Hours: Not open 24 hours        Local or Mail Order:local    Ordering Provider: DR Mcgraw    Would the patient rather a call back or a response via [facility]? call    Best Call Back Number:[phone]      Additional Information: patient requesting just a few to hold her over before her appt.

## 2025-06-24 NOTE — TELEPHONE ENCOUNTER
No care due was identified.  [facility] Embedded Care Due Messages. Reference number: [id number].   6/24/2025 2:17:31 PM CDT

## 2025-06-30 DIAGNOSIS — I10 ESSENTIAL HYPERTENSION: ICD-10-CM

## 2025-06-30 RX ORDER — TRAMADOL HYDROCHLORIDE 50 MG/1
TABLET, FILM COATED ORAL
Qty: 50 TABLET | Refills: 0 | Status: SHIPPED | OUTPATIENT
Start: 2025-06-30

## 2025-06-30 RX ORDER — ATORVASTATIN CALCIUM 40 MG/1
40 TABLET, FILM COATED ORAL NIGHTLY
Qty: 90 TABLET | Refills: 0 | Status: SHIPPED | OUTPATIENT
Start: 2025-06-30

## 2025-06-30 NOTE — TELEPHONE ENCOUNTER
No care due was identified.  Health [facility] Embedded Care Due Messages. Reference number: [id number].   6/30/2025 3:58:01 PM CDT

## 2025-07-01 RX ORDER — METOPROLOL TARTRATE 25 MG/1
25 TABLET, FILM COATED ORAL 2 TIMES DAILY
Qty: 180 TABLET | Refills: 3 | Status: SHIPPED | OUTPATIENT
Start: 2025-07-01

## 2025-07-01 NOTE — TELEPHONE ENCOUNTER
Refill Routing Note   Medication(s) are not appropriate for processing by [facility] for the following reason(s):        Required vitals outdated    [facility] action(s):  Defer             Appointments  past 12m or future 3m with PCP    Date Provider   Last Visit   6/24/2025 Dino Mcgraw MD   Next Visit   6/30/2025 Dino Mcgraw MD   ED visits in past 90 days: 0        Note composed:11:55 PM 06/30/2025

## 2025-07-01 NOTE — PROGRESS NOTES
Patient ID: Vane Seo is a 59 y.o. female.        E-Visit Time Tracking:   Day 1 Time (in minutes): 12  Total Time (in minutes): 12      Chief Complaint: Back Pain (Entered automatically based on patient selection in [employer].)      The patient initiated a request through [employer] on 6/24/2025 for evaluation and management with a chief complaint of Back Pain (Entered automatically based on patient selection in [employer].)     I evaluated the questionnaire submission on 06/30/2025.    [facility] P[facility] Evisit Back Pain    6/30/2025  5:34 PM CDT - Filed by Patient   Do you agree to participate in an E-Visit? Yes   If you have any of the following symptoms, please present to your local emergency room or call 911: I acknowledge   Choose the state of your primary residence [place]   What is the main issue you would like addressed today? Im out of tramadol that i have to take everyday for fibromyligia if i dont take a feel sick. r   Where is your back pain located? (Upper Back, Middle Back, Lower Back, Buttocks) Lower Back   Does the pain extend into your legs? (Left leg, Right leg, Both legs, None) Both legs   On a scale of 1-10, where 10 is the worst pain imaginable, how severe is your back pain? 7   Did you have an injury that could have caused the pain? No   How long have you had back pain? (Just today, About a week, About a month, More than a month) More than a month   Have you experienced similar back pain in the past? (Yes, Sometimes, This pain is new, Never) Yes   Please list any medications or treatments you have used for back pain and indicate if it was effective or not. Tramadol for 20 years thats the only thing that helps   Do you have a fever? (101°F or under, Over 101°F, No, Not sure) No   Do you have any of the following? (Areas that are numb, tingle, or feel strange, Discomfort or trouble when urinating, Fatigue, New loss of bowel or bladder control, Loss of appetite, Unexpected weight loss, Weakness, Rash  in area of pain, None) Areas that are numb, tingle, or feel strange;  Fatigue;  Loss of appetite   What makes the pain worse? (Any movement, Bending over, Sitting down, Intense activity, Other, None of the above) Bending over;  Sitting down;  Intense activity;  Other   What makes the pain worse? Not taking my meds   What makes the pain better? (Hot or cold compress, Leaning forward, Lying in bed, Over the counter pain medicine, Prescription pain medicine, Other, None) Lying in bed;  Prescription pain medicine   Have you ever been diagnosed with cancer? Yes   Have you ever been diagnosed with arthritis of the spine (also called degenerative disc disease)? No   Have you ever been diagnosed with osteoporosis or any other condition that causes weak bones? Not sure   Have you ever had surgery on your back or spine? No   How would you describe your usual activity level? (Active - regular exercise/physical activity, Moderately active - some movement, but no regular exercise, Sedentary - mostly sitting, little activity, Limited mobility - restricted movement) Moderately active - some movement, but no regular exercise   Provide any information you feel is important to your history not asked above    Please attach any relevant images or files    Are you able to take your vitals? No         Encounter Diagnoses   Name Primary?    Essential hypertension     Fibromyalgia     Cancer associated pain         No orders of the defined types were placed in this encounter.     Medications Ordered This Encounter   Medications    atorvastatin (LIPITOR) 40 MG tablet     Sig: Take 1 tablet (40 mg total) by mouth every evening.     Dispense:  90 tablet     Refill:  0    traMADoL (ULTRAM) 50 mg tablet     Sig: TAKE 1 TABLET(S) BY MOUTH EVERY 4 HOURS WHILE AWAKE AND TAKE 2 TABLET(S) AT BEDTIME     Dispense:  50 tablet     Refill:  0     >7 days medically necessary        No follow-ups on file.

## 2025-07-08 ENCOUNTER — OFFICE VISIT (OUTPATIENT)
Dept: FAMILY MEDICINE | Facility: CLINIC | Age: 60
End: 2025-07-08
Payer: COMMERCIAL

## 2025-07-08 ENCOUNTER — LAB VISIT (OUTPATIENT)
Dept: LAB | Facility: HOSPITAL | Age: 60
End: 2025-07-08
Attending: FAMILY MEDICINE
Payer: COMMERCIAL

## 2025-07-08 VITALS
SYSTOLIC BLOOD PRESSURE: 110 MMHG | BODY MASS INDEX: 25.85 KG/M2 | WEIGHT: 151.44 LBS | HEART RATE: 59 BPM | TEMPERATURE: 98 F | DIASTOLIC BLOOD PRESSURE: 70 MMHG | OXYGEN SATURATION: 97 % | HEIGHT: 64 IN

## 2025-07-08 DIAGNOSIS — C50.919 MALIGNANT NEOPLASM OF FEMALE BREAST, UNSPECIFIED ESTROGEN RECEPTOR STATUS, UNSPECIFIED LATERALITY, UNSPECIFIED SITE OF BREAST: Primary | ICD-10-CM

## 2025-07-08 DIAGNOSIS — G89.3 CANCER ASSOCIATED PAIN: ICD-10-CM

## 2025-07-08 DIAGNOSIS — M79.7 FIBROMYALGIA: ICD-10-CM

## 2025-07-08 DIAGNOSIS — Z00.00 ANNUAL PHYSICAL EXAM: ICD-10-CM

## 2025-07-08 DIAGNOSIS — F41.9 ANXIETY: ICD-10-CM

## 2025-07-08 DIAGNOSIS — I10 ESSENTIAL HYPERTENSION: ICD-10-CM

## 2025-07-08 LAB
ABSOLUTE NEUTROPHIL MANUAL (OHS): 3.3 K/UL
ALBUMIN SERPL BCP-MCNC: 3.9 G/DL (ref 3.5–5.2)
ALP SERPL-CCNC: 100 UNIT/L (ref 40–150)
ALT SERPL W/O P-5'-P-CCNC: 30 UNIT/L (ref 10–44)
ANION GAP (OHS): 15 MMOL/L (ref 8–16)
AST SERPL-CCNC: 38 UNIT/L (ref 11–45)
BILIRUB SERPL-MCNC: 0.5 MG/DL (ref 0.1–1)
BUN SERPL-MCNC: 29 MG/DL (ref 6–20)
CALCIUM SERPL-MCNC: 10.1 MG/DL (ref 8.7–10.5)
CHLORIDE SERPL-SCNC: 99 MMOL/L (ref 95–110)
CHOLEST SERPL-MCNC: 149 MG/DL (ref 120–199)
CHOLEST/HDLC SERPL: 2.4 {RATIO} (ref 2–5)
CO2 SERPL-SCNC: 27 MMOL/L (ref 23–29)
CREAT SERPL-MCNC: 1.6 MG/DL (ref 0.5–1.4)
D DIMER PPP IA.FEU-MCNC: 2.5 MG/L FEU
EOSINOPHIL NFR BLD MANUAL: 4 % (ref 0–8)
ERYTHROCYTE [DISTWIDTH] IN BLOOD BY AUTOMATED COUNT: 15.4 % (ref 11.5–14.5)
ERYTHROCYTE [SEDIMENTATION RATE] IN BLOOD BY PHOTOMETRIC METHOD: 62 MM/HR
GFR SERPLBLD CREATININE-BSD FMLA CKD-EPI: 37 ML/MIN/1.73/M2
GLUCOSE SERPL-MCNC: 99 MG/DL (ref 70–110)
HCT VFR BLD AUTO: 35.2 % (ref 37–48.5)
HDLC SERPL-MCNC: 61 MG/DL (ref 40–75)
HDLC SERPL: 40.9 % (ref 20–50)
HGB BLD-MCNC: 11 GM/DL (ref 12–16)
LDLC SERPL CALC-MCNC: 66.8 MG/DL (ref 63–159)
LYMPHOCYTES NFR BLD MANUAL: 50 % (ref 18–48)
MCH RBC QN AUTO: 30.1 PG (ref 27–31)
MCHC RBC AUTO-ENTMCNC: 31.3 G/DL (ref 32–36)
MCV RBC AUTO: 96 FL (ref 82–98)
MONOCYTES NFR BLD MANUAL: 1 % (ref 4–15)
MYELOCYTES NFR BLD MANUAL: 1 %
NEUTROPHILS NFR BLD MANUAL: 41 % (ref 38–73)
NEUTS BAND NFR BLD MANUAL: 3 %
NONHDLC SERPL-MCNC: 88 MG/DL
NUCLEATED RBC (/100WBC) (OHS): 1 /100 WBC
PLATELET # BLD AUTO: 68 K/UL (ref 150–450)
PLATELET BLD QL SMEAR: ABNORMAL
PMV BLD AUTO: 9.9 FL (ref 9.2–12.9)
POLYCHROMASIA BLD QL SMEAR: ABNORMAL
POTASSIUM SERPL-SCNC: 4.3 MMOL/L (ref 3.5–5.1)
PROT SERPL-MCNC: 8.2 GM/DL (ref 6–8.4)
RBC # BLD AUTO: 3.65 M/UL (ref 4–5.4)
SODIUM SERPL-SCNC: 141 MMOL/L (ref 136–145)
T4 FREE SERPL-MCNC: 0.99 NG/DL (ref 0.71–1.51)
TRIGL SERPL-MCNC: 106 MG/DL (ref 30–150)
TSH SERPL-ACNC: 12.37 UIU/ML (ref 0.4–4)
WBC # BLD AUTO: 7.5 K/UL (ref 3.9–12.7)

## 2025-07-08 PROCEDURE — 3008F BODY MASS INDEX DOCD: CPT | Mod: CPTII,S$GLB,, | Performed by: FAMILY MEDICINE

## 2025-07-08 PROCEDURE — 85025 COMPLETE CBC W/AUTO DIFF WBC: CPT

## 2025-07-08 PROCEDURE — 99999 PR PBB SHADOW E&M-EST. PATIENT-LVL III: CPT | Mod: PBBFAC,,, | Performed by: FAMILY MEDICINE

## 2025-07-08 PROCEDURE — G2211 COMPLEX E/M VISIT ADD ON: HCPCS | Mod: S$GLB,,, | Performed by: FAMILY MEDICINE

## 2025-07-08 PROCEDURE — 80053 COMPREHEN METABOLIC PANEL: CPT

## 2025-07-08 PROCEDURE — 85379 FIBRIN DEGRADATION QUANT: CPT

## 2025-07-08 PROCEDURE — 83036 HEMOGLOBIN GLYCOSYLATED A1C: CPT

## 2025-07-08 PROCEDURE — 85652 RBC SED RATE AUTOMATED: CPT

## 2025-07-08 PROCEDURE — 3078F DIAST BP <80 MM HG: CPT | Mod: CPTII,S$GLB,, | Performed by: FAMILY MEDICINE

## 2025-07-08 PROCEDURE — 84443 ASSAY THYROID STIM HORMONE: CPT

## 2025-07-08 PROCEDURE — 1159F MED LIST DOCD IN RCRD: CPT | Mod: CPTII,S$GLB,, | Performed by: FAMILY MEDICINE

## 2025-07-08 PROCEDURE — 36415 COLL VENOUS BLD VENIPUNCTURE: CPT | Mod: PO

## 2025-07-08 PROCEDURE — 80061 LIPID PANEL: CPT

## 2025-07-08 PROCEDURE — 3074F SYST BP LT 130 MM HG: CPT | Mod: CPTII,S$GLB,, | Performed by: FAMILY MEDICINE

## 2025-07-08 PROCEDURE — 99215 OFFICE O/P EST HI 40 MIN: CPT | Mod: S$GLB,,, | Performed by: FAMILY MEDICINE

## 2025-07-08 RX ORDER — METOPROLOL TARTRATE 25 MG/1
25 TABLET, FILM COATED ORAL 2 TIMES DAILY
Qty: 180 TABLET | Refills: 3 | Status: SHIPPED | OUTPATIENT
Start: 2025-07-08

## 2025-07-08 RX ORDER — ESCITALOPRAM OXALATE 10 MG/1
10 TABLET ORAL NIGHTLY
Qty: 90 TABLET | Refills: 0 | Status: SHIPPED | OUTPATIENT
Start: 2025-07-08

## 2025-07-08 RX ORDER — ALPRAZOLAM 2 MG/1
TABLET ORAL
Qty: 25 TABLET | Refills: 2 | Status: SHIPPED | OUTPATIENT
Start: 2025-07-08

## 2025-07-08 RX ORDER — ONDANSETRON 4 MG/1
4 TABLET, FILM COATED ORAL EVERY 6 HOURS PRN
Qty: 20 TABLET | Refills: 11 | Status: SHIPPED | OUTPATIENT
Start: 2025-07-08

## 2025-07-08 RX ORDER — TRAMADOL HYDROCHLORIDE 50 MG/1
TABLET, FILM COATED ORAL
Qty: 120 TABLET | Refills: 5 | Status: SHIPPED | OUTPATIENT
Start: 2025-07-08

## 2025-07-09 ENCOUNTER — PATIENT MESSAGE (OUTPATIENT)
Dept: FAMILY MEDICINE | Facility: CLINIC | Age: 60
End: 2025-07-09
Payer: COMMERCIAL

## 2025-07-09 DIAGNOSIS — E03.9 HYPOTHYROIDISM, UNSPECIFIED TYPE: Primary | ICD-10-CM

## 2025-07-09 DIAGNOSIS — D69.6 LOW PLATELET COUNT: ICD-10-CM

## 2025-07-09 DIAGNOSIS — D65 DISSEMINATED INTRAVASCULAR COAGULATION (DEFIBRINATION SYNDROME): ICD-10-CM

## 2025-07-09 DIAGNOSIS — R79.89 POSITIVE D DIMER: ICD-10-CM

## 2025-07-09 LAB
EAG (OHS): 97 MG/DL (ref 68–131)
HBA1C MFR BLD: 5 % (ref 4–5.6)

## 2025-07-09 RX ORDER — LEVOTHYROXINE SODIUM 50 UG/1
50 TABLET ORAL
Qty: 30 TABLET | Refills: 11 | Status: SHIPPED | OUTPATIENT
Start: 2025-07-09 | End: 2026-07-09

## 2025-07-09 NOTE — PROGRESS NOTES
"HISTORY OF PRESENT ILLNESS:  Vane Seo is a 59 y.o. female who presents to the clinic today for Annual Exam  .       She is not doing wlel  Fatigue and Bailey  Weight is coming down  Trying to work out but it wipes her out  Sleeps for a day at a time if really exhausted  Completed treatment for cancer  Has not noted new findings  Some swelling in the legs.  Comes and goes  Pain is still a mjor issue and some nerve pain from chemo.    Problem List[1]        CARE TEAM:  Patient Care Team:  Dino Mcgraw MD as PCP - General (Family Medicine)  Azam Rivas MD as Consulting Physician (Rheumatology)  Max العراقي MD as Consulting Physician (Plastic Surgery)  George Ramirez Jr., MD as Consulting Physician (Hematology and Oncology)  Angela Herman MD as Obstetrician (Obstetrics and Gynecology)  Norma Hugo LPN as Licensed Practical Nurse         ROS        PHYSICAL EXAM:   /70   Pulse (!) 59   Temp 98.1 °F (36.7 °C)   Ht 5' 4" (1.626 m)   Wt 68.7 kg (151 lb 7.3 oz)   LMP 10/11/2017   SpO2 97%   BMI 26.00 kg/m²   BP Readings from Last 5 Encounters:   07/08/25 110/70   06/17/24 118/68   05/04/23 120/80   10/14/21 114/70   06/10/21 134/76     Wt Readings from Last 5 Encounters:   07/08/25 68.7 kg (151 lb 7.3 oz)   06/17/24 73 kg (160 lb 15 oz)   05/04/23 76 kg (167 lb 8.8 oz)   10/14/21 78 kg (172 lb)   07/29/21 79.8 kg (176 lb)             Looks chronically ill, not herself  Just down overall not herself  Lungs clear  No abdominal issues  Some right leg swelling  No real tenderness  Not severe.       Medication List with Changes/Refills   New Medications    LEVOTHYROXINE (SYNTHROID) 50 MCG TABLET    Take 1 tablet (50 mcg total) by mouth before breakfast.   Current Medications    IBUPROFEN (ADVIL,MOTRIN) 200 MG TABLET    Take 200 mg by mouth every 6 (six) hours as needed for Pain.   Changed and/or Refilled Medications    Modified Medication Previous Medication    ALPRAZOLAM " (XANAX) 2 MG TAB ALPRAZolam (XANAX) 2 MG Tab       TAKE 1 TABLET BY MOUTH AT BEDTIME AS NEEDED *MAY CAUSE DROWSINESS    TAKE 1 TABLET BY MOUTH AT BEDTIME AS NEEDED *MAY CAUSE DROWSINESS    ESCITALOPRAM OXALATE (LEXAPRO) 10 MG TABLET EScitalopram oxalate (LEXAPRO) 10 MG tablet       Take 1 tablet (10 mg total) by mouth every evening.    TAKE 1 TABLET BY MOUTH EVERY EVENING    METOPROLOL TARTRATE (LOPRESSOR) 25 MG TABLET metoprolol tartrate (LOPRESSOR) 25 MG tablet       Take 1 tablet (25 mg total) by mouth 2 (two) times daily.    TAKE 1 TABLET BY MOUTH TWICE DAILY    ONDANSETRON (ZOFRAN) 4 MG TABLET ondansetron (ZOFRAN) 4 MG tablet       Take 1 tablet (4 mg total) by mouth every 6 (six) hours as needed for Nausea.    Take 1 tablet (4 mg total) by mouth every 6 (six) hours as needed for Nausea.    TRAMADOL (ULTRAM) 50 MG TABLET traMADoL (ULTRAM) 50 mg tablet       TAKE 1 TABLET(S) BY MOUTH EVERY 4 HOURS WHILE AWAKE AND TAKE 2 TABLET(S) AT BEDTIME    TAKE 1 TABLET(S) BY MOUTH EVERY 4 HOURS WHILE AWAKE AND TAKE 2 TABLET(S) AT BEDTIME   Discontinued Medications    ASPIRIN (ECOTRIN) 81 MG EC TABLET    Take 81 mg by mouth once daily.    ATORVASTATIN (LIPITOR) 40 MG TABLET    Take 1 tablet (40 mg total) by mouth every evening.    DIAZEPAM (VALIUM) 5 MG TABLET    TAKE 1 TABLET(S) BY MOUTH EVERY 8 HOURS AS NEEDED. MAY CAUSE DROWSINESS    FLUOROURACIL (EFUDEX) 5 % CREAM    Use hs for 2 weeks    NALOXONE (NARCAN) 4 MG/ACTUATION SPRY    4mg by nasal route as needed for opioid overdose; may repeat every 2-3 minutes in alternating nostrils until medical help arrives. Call 911    NITROGLYCERIN (NITROSTAT) 0.4 MG SL TABLET    Place 1 tablet (0.4 mg total) under the tongue every 5 (five) minutes as needed for Chest pain.         ASSESSMENT AND PLAN:    Problem List Items Addressed This Visit       Fibromyalgia (Chronic)    Overview   4/17 start norvasc 2.5, increase monthly by 2.5 to 10 mg.         Relevant Medications     EScitalopram oxalate (LEXAPRO) 10 MG tablet    traMADoL (ULTRAM) 50 mg tablet    Essential hypertension (Chronic)    Relevant Medications    metoprolol tartrate (LOPRESSOR) 25 MG tablet    Breast cancer - Primary    Overview   Still on tamoxifen (2019)  mina for cancer, Vichot for arthritis         Current Assessment & Plan   Stable and doing well  She is under control         Anxiety    Relevant Medications    ALPRAZolam (XANAX) 2 MG Tab    EScitalopram oxalate (LEXAPRO) 10 MG tablet     Other Visit Diagnoses         Cancer associated pain        Relevant Medications    EScitalopram oxalate (LEXAPRO) 10 MG tablet    traMADoL (ULTRAM) 50 mg tablet      Annual physical exam        Relevant Orders    CBC Auto Differential (Completed)    Comprehensive Metabolic Panel (Completed)    Urinalysis, Reflex to Urine Culture Urine, Clean Catch (Completed)    Hemoglobin A1C    Lipid Panel (Completed)    TSH (Completed)    D-Dimer, Quantitative (Completed)    Sedimentation rate (Completed)          She is more lethargic  Just having difficulty staying awake  Unusual for her  Given histroy get work up  And be prepared to do additional testing  Guarded prognosis  Very high risk    Future Appointments   Date Time Provider Department Center   1/8/2026  2:00 PM Dino Mcgraw MD [facility]       Follow up in about 1 year (around 7/8/2026) for assess treatment plan. or sooner as needed.         [1]   Patient Active Problem List  Diagnosis    Breast cancer    Fibromyalgia    History of pilonidal cyst    Menstrual migraine without status migrainosus, not intractable    Mood disorder    Essential hypertension    Anxiety    Abnormal stress test

## 2025-07-24 ENCOUNTER — TELEPHONE (OUTPATIENT)
Dept: FAMILY MEDICINE | Facility: CLINIC | Age: 60
End: 2025-07-24
Payer: COMMERCIAL

## 2025-07-24 ENCOUNTER — PATIENT MESSAGE (OUTPATIENT)
Dept: FAMILY MEDICINE | Facility: CLINIC | Age: 60
End: 2025-07-24
Payer: COMMERCIAL

## 2025-07-24 ENCOUNTER — HOSPITAL ENCOUNTER (OUTPATIENT)
Dept: RADIOLOGY | Facility: HOSPITAL | Age: 60
Discharge: HOME | End: 2025-07-24
Attending: FAMILY MEDICINE
Payer: COMMERCIAL

## 2025-07-24 DIAGNOSIS — R79.89 POSITIVE D DIMER: ICD-10-CM

## 2025-07-24 DIAGNOSIS — C90.00 MULTIPLE MYELOMA, REMISSION STATUS UNSPECIFIED: ICD-10-CM

## 2025-07-24 DIAGNOSIS — M89.8X9 LYTIC BONE LESIONS ON XRAY: Primary | ICD-10-CM

## 2025-07-24 DIAGNOSIS — D69.6 LOW PLATELET COUNT: ICD-10-CM

## 2025-07-24 DIAGNOSIS — D65 DISSEMINATED INTRAVASCULAR COAGULATION (DEFIBRINATION SYNDROME): ICD-10-CM

## 2025-07-24 PROCEDURE — 93970 EXTREMITY STUDY: CPT | Mod: TC

## 2025-07-24 PROCEDURE — 93970 EXTREMITY STUDY: CPT | Mod: 26,,, | Performed by: RADIOLOGY

## 2025-07-24 PROCEDURE — 25500020 PHARM REV CODE 255: Performed by: FAMILY MEDICINE

## 2025-07-24 PROCEDURE — 71275 CT ANGIOGRAPHY CHEST: CPT | Mod: TC

## 2025-07-24 RX ADMIN — IOHEXOL 100 ML: 350 INJECTION, SOLUTION INTRAVENOUS at 03:07

## 2025-07-24 NOTE — TELEPHONE ENCOUNTER
Nurse received a call from Dr. Jean Tapia at 15:50pm , Please review today's encounter regarding CTA of the chest, per Dr. Tapia, there is no PE (pulmonary embolism) noted, Myleloma and Ascites present, can be the cause of the way the patient feeling. This patient is for Dr. Mcgraw. Nurse forward high priority information to provider on call answering Dr. Mcgraw inbox.

## 2025-07-31 ENCOUNTER — TELEPHONE (OUTPATIENT)
Dept: HEMATOLOGY/ONCOLOGY | Facility: CLINIC | Age: 60
End: 2025-07-31
Payer: COMMERCIAL

## 2025-07-31 NOTE — TELEPHONE ENCOUNTER
You recently sent a request for this patient to be seen on [facility]  MRN # [id number]. Pedro Luis Hameed  This location does not see patients with any form on brain cancer  Please schedule at [facility]  Thanks Selam denson RN

## 2025-08-06 ENCOUNTER — PATIENT MESSAGE (OUTPATIENT)
Dept: FAMILY MEDICINE | Facility: CLINIC | Age: 60
End: 2025-08-06
Payer: COMMERCIAL

## (undated) DEVICE — CATH DXTERITY JL35 100CM 5FR

## (undated) DEVICE — KIT GLIDESHEATH SLEND 6FR 10CM

## (undated) DEVICE — PACK CATH LAB

## (undated) DEVICE — KIT SYR REUSABLE

## (undated) DEVICE — WIRE GUIDE SAFE-T-J .035 260CM

## (undated) DEVICE — HEMOSTAT VASC BAND REG 24CM

## (undated) DEVICE — PAD DEFIB CADENCE ADULT R2

## (undated) DEVICE — CATH EMPULSE ANGLED 5FR PIGTAI

## (undated) DEVICE — KIT MANIFOLD LOW PRESS TUBING

## (undated) DEVICE — KIT HAND CONTROL HIGH PRESSUR

## (undated) DEVICE — CATH DXTERITY JR40 100CM 5FR

## (undated) DEVICE — PAD RADI FEMORAL

## (undated) DEVICE — OMNIPAQUE 350MG 150ML VIAL